# Patient Record
Sex: MALE | Race: WHITE | Employment: OTHER | URBAN - METROPOLITAN AREA
[De-identification: names, ages, dates, MRNs, and addresses within clinical notes are randomized per-mention and may not be internally consistent; named-entity substitution may affect disease eponyms.]

---

## 2017-01-04 ENCOUNTER — OFFICE VISIT (OUTPATIENT)
Dept: FAMILY MEDICINE | Facility: CLINIC | Age: 57
End: 2017-01-04
Payer: COMMERCIAL

## 2017-01-04 VITALS
RESPIRATION RATE: 22 BRPM | OXYGEN SATURATION: 98 % | DIASTOLIC BLOOD PRESSURE: 60 MMHG | WEIGHT: 208 LBS | TEMPERATURE: 96.6 F | BODY MASS INDEX: 29.09 KG/M2 | HEART RATE: 68 BPM | SYSTOLIC BLOOD PRESSURE: 100 MMHG

## 2017-01-04 DIAGNOSIS — G89.29 CHRONIC LEFT-SIDED LOW BACK PAIN WITHOUT SCIATICA: Primary | ICD-10-CM

## 2017-01-04 DIAGNOSIS — Z12.5 SCREENING FOR PROSTATE CANCER: ICD-10-CM

## 2017-01-04 DIAGNOSIS — M54.50 CHRONIC LEFT-SIDED LOW BACK PAIN WITHOUT SCIATICA: Primary | ICD-10-CM

## 2017-01-04 DIAGNOSIS — M54.2 CERVICALGIA: ICD-10-CM

## 2017-01-04 DIAGNOSIS — E78.5 HYPERLIPIDEMIA LDL GOAL <130: ICD-10-CM

## 2017-01-04 PROCEDURE — 99214 OFFICE O/P EST MOD 30 MIN: CPT | Performed by: FAMILY MEDICINE

## 2017-01-04 NOTE — NURSING NOTE
"Chief Complaint   Patient presents with     Back Pain     Back pain is still very bad and he is not getting any better.       Initial /60 mmHg  Pulse 68  Temp(Src) 96.6  F (35.9  C) (Temporal)  Resp 22  Wt 208 lb (94.348 kg)  SpO2 98% Estimated body mass index is 29.09 kg/(m^2) as calculated from the following:    Height as of 1/25/16: 5' 10.9\" (1.801 m).    Weight as of this encounter: 208 lb (94.348 kg).  BP completed using cuff size: regular    "
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
## 2017-01-04 NOTE — MR AVS SNAPSHOT
After Visit Summary   1/4/2017    Shashi Raza    MRN: [id number]           Patient Information     Date Of Birth          1960        Visit Information        Provider Department      1/4/2017 1:00 PM Yoni Kong MD [facility]        Today's Diagnoses     Chronic left-sided low back pain without sciatica    -  1     Hyperlipidemia LDL goal <130         Screening for prostate cancer         Cervicalgia            Follow-ups after your visit        Your next 10 appointments already scheduled     [place] 10, 2017  4:15 PM   MR LUMBAR SPINE W/O CONTRAST with PHMR1   [facility] ([facility])    [address] [address]   [phone]           Take your medicines as usual, unless your doctor tells you not to. Bring a list of your current medicines to your exam (including vitamins, minerals and over-the-counter drugs). Also bring the results of similar scans you may have had.  Please remove any body piercings and hair extensions before you arrive.  Follow your doctor s orders. If you do not, we may have to postpone your exam.  You will not have contrast for this exam. You do not need to do anything special to prepare.  The MRI machine uses a strong magnet. Please wear clothes without metal (snaps, zippers). A sweatsuit works well, or we may give you a hospital gown.   **IMPORTANT** THE INSTRUCTIONS BELOW ARE ONLY FOR THOSE PATIENTS WHO HAVE BEEN TOLD THEY WILL RECEIVE SEDATION OR GENERAL ANESTHESIA DURING THEIR MRI PROCEDURE:  IF YOU WILL RECEIVE SEDATION (take medicine to help you relax during your exam):   You must get the medicine from your doctor before you arrive. Bring the medicine to the exam. Do not take it at home.   Arrive one hour early. Bring someone who can take you home after the test. Your medicine will make you sleepy. After the exam, you may not drive, take a bus or take a taxi by yourself.   No eating 8 hours before  your exam. You may have clear liquids up until 4 hours before your exam. (Clear liquids include water, clear tea, black coffee and fruit juice without pulp.)  IF YOU WILL RECEIVE ANESTHESIA (be asleep for your exam):   Arrive 1 1/2 hours early. Bring someone who can take you home after the test. You may not drive, take a bus or take a taxi by yourself.   No eating 8 hours before your exam. You may have clear liquids up until 4 hours before your exam. (Clear liquids include water, clear tea, black coffee and fruit juice without pulp.)   You will spend four to five hours in the recovery room.  Please call the Imaging Department at your exam site with any questions.              Future tests that were ordered for you today     Open Future Orders        Priority Expected Expires Ordered    MR Lumbar Spine w/o Contrast Routine  1/4/2018 1/4/2017    Lipid Profile (Chol, Trig, HDL, LDL calc) Routine  1/4/2018 1/4/2017    PSA, screen Routine  1/4/2018 1/4/2017    Comprehensive metabolic panel (BMP + Alb, Alk Phos, ALT, AST, Total. Bili, TP) Routine  1/4/2018 1/4/2017            Who to contact     If you have questions or need follow up information about today's clinic visit or your schedule please contact [facility] directly at [phone].  Normal or non-critical lab and imaging results will be communicated to you by Purfreshhart, letter or phone within 4 business days after the clinic has received the results. If you do not hear from us within 7 days, please contact the clinic through Purfreshhart or phone. If you have a critical or abnormal lab result, we will notify you by phone as soon as possible.  Submit refill requests through test company or call your pharmacy and they will forward the refill request to us. Please allow 3 business days for your refill to be completed.          Additional Information About Your Visit        test company Information     test company lets you send messages to your doctor, view your test  "results, renew your prescriptions, schedule appointments and more. To sign up, go to [email or web address] . Click on \"Log in\" on the left side of the screen, which will take you to the Welcome page. Then click on \"Sign up Now\" on the right side of the page.     You will be asked to enter the access code listed below, as well as some personal information. Please follow the directions to create your username and password.     Your access code is: [email or web address]  Expires: 2017  7:56 AM     Your access code will  in 90 days. If you need help or a new code, please call your [place] clinic or [phone].        Care EveryWhere ID     This is your Care EveryWhere ID. This could be used by other organizations to access your [place] medical records  [id number]        Your Vitals Were     Pulse Temperature Respirations Pulse Oximetry          68 96.6  F (35.9  C) (Temporal) 22 98%         Blood Pressure from Last 3 Encounters:   17 100/60   16 120/70   10/26/16 118/78    Weight from Last 3 Encounters:   17 208 lb (94.348 kg)   10/26/16 203 lb (92.08 kg)   16 208 lb (94.348 kg)               Primary Care Provider Office Phone # Fax #    Yoni Kong [phone]       [facility] [address] [place] [address]        Thank you!     Thank you for choosing [facility]  for your care. Our goal is always to provide you with excellent care. Hearing back from our patients is one way we can continue to improve our services. Please take a few minutes to complete the written survey that you may receive in the mail after your visit with us. Thank you!             Your Updated Medication List - Protect others around you: Learn how to safely use, store and throw away your medicines at www.disposemymeds.org.          This list is accurate as of: 17  1:37 PM.  Always use your most recent med list.                   Brand Name Dispense " Instructions for use    atorvastatin 20 MG tablet    LIPITOR    90 tablet    Take 1 tablet (20 mg) by mouth daily

## 2017-01-10 ENCOUNTER — HOSPITAL ENCOUNTER (OUTPATIENT)
Dept: MRI IMAGING | Facility: CLINIC | Age: 57
Discharge: HOME | End: 2017-01-10
Attending: FAMILY MEDICINE | Admitting: FAMILY MEDICINE
Payer: COMMERCIAL

## 2017-01-10 DIAGNOSIS — M54.50 CHRONIC LEFT-SIDED LOW BACK PAIN WITHOUT SCIATICA: ICD-10-CM

## 2017-01-10 DIAGNOSIS — G89.29 CHRONIC LEFT-SIDED LOW BACK PAIN WITHOUT SCIATICA: ICD-10-CM

## 2017-01-10 PROCEDURE — 72148 MRI LUMBAR SPINE W/O DYE: CPT

## 2017-01-11 ENCOUNTER — TRANSFERRED RECORDS (OUTPATIENT)
Dept: HEALTH INFORMATION MANAGEMENT | Facility: CLINIC | Age: 57
End: 2017-01-11

## 2017-01-12 DIAGNOSIS — Z12.5 SCREENING FOR PROSTATE CANCER: ICD-10-CM

## 2017-01-12 DIAGNOSIS — E78.5 HYPERLIPIDEMIA LDL GOAL <130: ICD-10-CM

## 2017-01-12 LAB
ALBUMIN SERPL-MCNC: 4.1 G/DL (ref 3.4–5)
ALP SERPL-CCNC: 62 U/L (ref 40–150)
ALT SERPL W P-5'-P-CCNC: 34 U/L (ref 0–70)
ANION GAP SERPL CALCULATED.3IONS-SCNC: 6 MMOL/L (ref 3–14)
AST SERPL W P-5'-P-CCNC: 22 U/L (ref 0–45)
BILIRUB SERPL-MCNC: 0.6 MG/DL (ref 0.2–1.3)
BUN SERPL-MCNC: 17 MG/DL (ref 7–30)
CALCIUM SERPL-MCNC: 9 MG/DL (ref 8.5–10.1)
CHLORIDE SERPL-SCNC: 107 MMOL/L (ref 94–109)
CHOLEST SERPL-MCNC: 157 MG/DL
CO2 SERPL-SCNC: 29 MMOL/L (ref 20–32)
CREAT SERPL-MCNC: 0.99 MG/DL (ref 0.66–1.25)
GFR SERPL CREATININE-BSD FRML MDRD: 78 ML/MIN/1.7M2
GLUCOSE SERPL-MCNC: 111 MG/DL (ref 70–99)
HDLC SERPL-MCNC: 54 MG/DL
LDLC SERPL CALC-MCNC: 72 MG/DL
NONHDLC SERPL-MCNC: 103 MG/DL
POTASSIUM SERPL-SCNC: 4.1 MMOL/L (ref 3.4–5.3)
PROT SERPL-MCNC: 6.9 G/DL (ref 6.8–8.8)
PSA SERPL-ACNC: 1.31 UG/L (ref 0–4)
SODIUM SERPL-SCNC: 142 MMOL/L (ref 133–144)
TRIGL SERPL-MCNC: 156 MG/DL

## 2017-01-12 PROCEDURE — 36415 COLL VENOUS BLD VENIPUNCTURE: CPT | Performed by: FAMILY MEDICINE

## 2017-01-12 PROCEDURE — 80061 LIPID PANEL: CPT | Performed by: FAMILY MEDICINE

## 2017-01-12 PROCEDURE — G0103 PSA SCREENING: HCPCS | Performed by: FAMILY MEDICINE

## 2017-01-12 PROCEDURE — 80053 COMPREHEN METABOLIC PANEL: CPT | Performed by: FAMILY MEDICINE

## 2017-01-26 ENCOUNTER — OFFICE VISIT (OUTPATIENT)
Dept: FAMILY MEDICINE | Facility: CLINIC | Age: 57
End: 2017-01-26
Payer: COMMERCIAL

## 2017-01-26 VITALS
BODY MASS INDEX: 29.4 KG/M2 | HEIGHT: 71 IN | RESPIRATION RATE: 16 BRPM | DIASTOLIC BLOOD PRESSURE: 60 MMHG | TEMPERATURE: 97.1 F | WEIGHT: 210 LBS | OXYGEN SATURATION: 98 % | HEART RATE: 72 BPM | SYSTOLIC BLOOD PRESSURE: 104 MMHG

## 2017-01-26 DIAGNOSIS — S59.901A ELBOW INJURY, RIGHT, INITIAL ENCOUNTER: ICD-10-CM

## 2017-01-26 DIAGNOSIS — Z00.01 ENCOUNTER FOR GENERAL ADULT MEDICAL EXAMINATION WITH ABNORMAL FINDINGS: Primary | ICD-10-CM

## 2017-01-26 DIAGNOSIS — M54.50 BILATERAL LOW BACK PAIN WITHOUT SCIATICA, UNSPECIFIED CHRONICITY: ICD-10-CM

## 2017-01-26 DIAGNOSIS — E78.5 HYPERLIPIDEMIA LDL GOAL <130: ICD-10-CM

## 2017-01-26 PROCEDURE — 99213 OFFICE O/P EST LOW 20 MIN: CPT | Mod: 25 | Performed by: FAMILY MEDICINE

## 2017-01-26 PROCEDURE — 99396 PREV VISIT EST AGE 40-64: CPT | Performed by: FAMILY MEDICINE

## 2017-01-26 RX ORDER — ATORVASTATIN CALCIUM 20 MG/1
20 TABLET, FILM COATED ORAL DAILY
Qty: 90 TABLET | Refills: 3 | Status: SHIPPED | OUTPATIENT
Start: 2017-01-26 | End: 2018-02-21

## 2017-01-26 NOTE — NURSING NOTE
"Chief Complaint   Patient presents with     Physical       Initial /60 mmHg  Pulse 72  Temp(Src) 97.1  F (36.2  C) (Temporal)  Resp 16  Ht 5' 10.9\" (1.801 m)  Wt 210 lb (95.255 kg)  BMI 29.37 kg/m2  SpO2 98% Estimated body mass index is 29.37 kg/(m^2) as calculated from the following:    Height as of this encounter: 5' 10.9\" (1.801 m).    Weight as of this encounter: 210 lb (95.255 kg).  BP completed using cuff size: regular    "

## 2017-01-26 NOTE — MR AVS SNAPSHOT
After Visit Summary   1/26/2017    Shashi Raza    MRN: [id number]           Patient Information     Date Of Birth          1960        Visit Information        Provider Department      1/26/2017 2:00 PM Yoni Kong MD [facility]        Today's Diagnoses     Encounter for general adult medical examination with abnormal findings    -  1     Hyperlipidemia LDL goal <130         Bilateral low back pain without sciatica, unspecified chronicity         Elbow injury, right, initial encounter           Care Instructions      Preventive Health Recommendations  Male Ages 50 - 64    Yearly exam:             See your health care provider every year in order to  o   Review health changes.   o   Discuss preventive care.    o   Review your medicines if your doctor has prescribed any.     Have a cholesterol test every 5 years, or more frequently if you are at risk for high cholesterol/heart disease.     Have a diabetes test (fasting glucose) every three years. If you are at risk for diabetes, you should have this test more often.     Have a colonoscopy at age 50, or have a yearly FIT test (stool test). These exams will check for colon cancer.      Talk with your health care provider about whether or not a prostate cancer screening test (PSA) is right for you.    You should be tested each year for STDs (sexually transmitted diseases), if you re at risk.     Shots: Get a flu shot each year. Get a tetanus shot every 10 years.     Nutrition:    Eat at least 5 servings of fruits and vegetables daily.     Eat whole-grain bread, whole-wheat pasta and brown rice instead of white grains and rice.     Talk to your provider about Calcium and Vitamin D.     Lifestyle    Exercise for at least 150 minutes a week (30 minutes a day, 5 days a week). This will help you control your weight and prevent disease.     Limit alcohol to one drink per day.     No smoking.     Wear sunscreen to prevent skin  cancer.     See your dentist every six months for an exam and cleaning.     See your eye doctor every 1 to 2 years.            Follow-ups after your visit        Additional Services     [facility] PT, HAND, AND CHIROPRACTIC REFERRAL       **This order will print in the [facility] Scheduling Office**    Physical Therapy, Hand Therapy and Chiropractic Care are available through:    *[facility]  *[facility]  *[facility]    Call one number to schedule at any of the above locations: [phone].    Your provider has referred you to: [facility]    Indication/Reason for Referral: LBP  Onset of Illness:   Therapy Orders: Evaluate and Treat  Special Programs:   Special Request:     Lexi Elliott      Additional Comments for the Therapist or Chiropractor:     Please be aware that coverage of these services is subject to the terms and limitations of your health insurance plan.  Call member services at your health plan with any benefit or coverage questions.      Please bring the following to your appointment:    *Your personal calendar for scheduling future appointments  *Comfortable clothing            ORTHOPEDICS ADULT REFERRAL       Your provider has referred you to: [facility]: [facility] [phone]   [email or web address]    Please be aware that coverage of these services is subject to the terms and limitations of your health insurance plan.  Call member services at your health plan with any benefit or coverage questions.      Please bring the following to your appointment:    >>   Any x-rays, CTs or MRIs which have been performed.  Contact the facility where they were done to arrange for  prior to your scheduled appointment.    >>   List of current medications   >>   This referral request   >>   Any documents/labs given to you for this referral                  Who to contact     If you have questions or need follow up  "information about today's clinic visit or your schedule please contact [facility] directly at [phone].  Normal or non-critical lab and imaging results will be communicated to you by IDOS CORPhart, letter or phone within 4 business days after the clinic has received the results. If you do not hear from us within 7 days, please contact the clinic through IDOS CORPhart or phone. If you have a critical or abnormal lab result, we will notify you by phone as soon as possible.  Submit refill requests through Favim or call your pharmacy and they will forward the refill request to us. Please allow 3 business days for your refill to be completed.          Additional Information About Your Visit        MyChart Information     Favim lets you send messages to your doctor, view your test results, renew your prescriptions, schedule appointments and more. To sign up, go to [email or web address] . Click on \"Log in\" on the left side of the screen, which will take you to the Welcome page. Then click on \"Sign up Now\" on the right side of the page.     You will be asked to enter the access code listed below, as well as some personal information. Please follow the directions to create your username and password.     Your access code is: [email or web address]  Expires: 2017  2:52 PM     Your access code will  in 90 days. If you need help or a new code, please call your [place] clinic or [phone].        Care EveryWhere ID     This is your Care EveryWhere ID. This could be used by other organizations to access your [place] medical records  [id number]        Your Vitals Were     Pulse Temperature Respirations Height BMI (Body Mass Index) Pulse Oximetry    72 97.1  F (36.2  C) (Temporal) 16 5' 10.9\" (1.801 m) 29.37 kg/m2 98%       Blood Pressure from Last 3 Encounters:   17 104/60   17 100/60   16 120/70    Weight from Last 3 Encounters:   17 210 lb (95.255 kg)   17 208 lb (94.348 kg) "   10/26/16 203 lb (92.08 kg)              We Performed the Following     [facility] PT, HAND, AND CHIROPRACTIC REFERRAL     ORTHOPEDICS ADULT REFERRAL          Where to get your medicines      These medications were sent to [facility] [place] - [address]  [address] [place] [facility] [address]     Phone:  [phone]    - atorvastatin 20 MG tablet       Primary Care Provider Office Phone # Fax #    Yoni Kong [phone]       [place] [id number]   [place] [address]        Thank you!     Thank you for choosing [facility]  for your care. Our goal is always to provide you with excellent care. Hearing back from our patients is one way we can continue to improve our services. Please take a few minutes to complete the written survey that you may receive in the mail after your visit with us. Thank you!             Your Updated Medication List - Protect others around you: Learn how to safely use, store and throw away your medicines at www.disposemymeds.org.          This list is accurate as of: 1/26/17  2:53 PM.  Always use your most recent med list.                   Brand Name Dispense Instructions for use    atorvastatin 20 MG tablet    LIPITOR    90 tablet    Take 1 tablet (20 mg) by mouth daily

## 2017-02-01 ENCOUNTER — OFFICE VISIT (OUTPATIENT)
Dept: ORTHOPEDICS | Facility: CLINIC | Age: 57
End: 2017-02-01
Payer: COMMERCIAL

## 2017-02-01 ENCOUNTER — THERAPY VISIT (OUTPATIENT)
Dept: CHIROPRACTIC MEDICINE | Facility: CLINIC | Age: 57
End: 2017-02-01
Payer: COMMERCIAL

## 2017-02-01 VITALS — BODY MASS INDEX: 29.4 KG/M2 | HEIGHT: 71 IN | WEIGHT: 210 LBS | TEMPERATURE: 97 F

## 2017-02-01 DIAGNOSIS — M99.04 SEGMENTAL DYSFUNCTION OF SACRAL REGION: ICD-10-CM

## 2017-02-01 DIAGNOSIS — M70.21 OLECRANON BURSITIS OF RIGHT ELBOW: Primary | ICD-10-CM

## 2017-02-01 DIAGNOSIS — M48.062 SPINAL STENOSIS OF LUMBAR REGION WITH NEUROGENIC CLAUDICATION: ICD-10-CM

## 2017-02-01 DIAGNOSIS — G63 POLYNEUROPATHY IN OTHER DISEASES CLASSIFIED ELSEWHERE (H): Primary | ICD-10-CM

## 2017-02-01 DIAGNOSIS — M54.50 LUMBAGO: ICD-10-CM

## 2017-02-01 DIAGNOSIS — M99.03 SEGMENTAL DYSFUNCTION OF LUMBAR REGION: Primary | ICD-10-CM

## 2017-02-01 PROCEDURE — 99203 OFFICE O/P NEW LOW 30 MIN: CPT | Performed by: ORTHOPAEDIC SURGERY

## 2017-02-01 PROCEDURE — 98940 CHIROPRACT MANJ 1-2 REGIONS: CPT | Mod: AT | Performed by: CHIROPRACTOR

## 2017-02-01 PROCEDURE — 99203 OFFICE O/P NEW LOW 30 MIN: CPT | Mod: 25 | Performed by: CHIROPRACTOR

## 2017-02-01 RX ORDER — CELECOXIB 200 MG/1
CAPSULE ORAL
Qty: 30 CAPSULE | Refills: 11 | Status: SHIPPED | OUTPATIENT
Start: 2017-02-01 | End: 2018-02-21

## 2017-02-01 ASSESSMENT — PAIN SCALES - GENERAL: PAINLEVEL: MILD PAIN (2)
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
## 2017-02-01 NOTE — NURSING NOTE
"Chief Complaint   Patient presents with     Consult     rt elbow pain since a fall in October of 2016 per Dr. Kong       Initial Temp(Src) 97  F (36.1  C)  Ht 1.801 m (5' 10.9\")  Wt 95.255 kg (210 lb)  BMI 29.37 kg/m2 Estimated body mass index is 29.37 kg/(m^2) as calculated from the following:    Height as of this encounter: 1.801 m (5' 10.9\").    Weight as of this encounter: 95.255 kg (210 lb).  BP completed using cuff size: NA (Not Taken)    Yuli Randolph MA      "

## 2017-02-01 NOTE — PATIENT INSTRUCTIONS
Encounter Diagnosis   Name Primary?     Olecranon bursitis of right elbow Yes     Rest, ice and elevate above heart level as needed for pain control  1. We reviewed your xrays, they look good but you have a bone spur that may be irritating the bursa  2. The bursa is like a deflated water balloon that protects the elbow, but it can get inflamed.  3. If it was swelled up we could take fluid out with a needle and put in some cortisone.  4. Without doing injections, you could do compression (like an ace wrap) or a sleeve.    5. Also NSAIDs like your Celebrex can help.  Ibuprofen is also good.  6. We gave you an ace wrap today.  7. Call us if you ever need the elbow drained, we can work you in.    8. Surgery is rare for this.  9. Follow up with Taryn Bruce MD and Carter Marcum PA-C on an as needed basis.   [employer] and [employer] may offer reliable information regarding your diagnosis and treatment plan.    THANK YOU for coming in today. If you receive a survey via [employer] or mail please let us know if there was anything you especially appreciated today or if there is any way we can improve our clinic. We appreciate your input.    GENERAL INFORMATION:  Our hours are:  Monday :     Clinic 7:30 AM-430 PM ([facility])  Tuesday:      Operating Room All Day ([facility])  Wednesday: Clinic 7:30 AM - 11:15 AM ([facility])             Clinic 1:00 PM - 4:00PM ([facility])  Thursday:     Administrative Day  Friday:          Clinic 7:30 AM - 11:15 AM ([facility])            Clinic 1:00 PM - 4:00 PM ([facility])    We are not in the office Thursdays. Therefore non- urgent calls and medical messages received on Thursday will be addressed when we are back in the office on Wednesday. Urgent matters will be reviewed and addressed by one of our partners in the office as needed.    If lab work was done  today as part of your evaluation you will generally be contacted via [employer], mail, or phone with the results within 1-5 days. If there is an alarming result we will contact you by phone. Lab results come back at varying times, I generally wait until all labs are resulted before making comments on results. Please note labs are automatically released to [employer] (if you have signed up for it) once available-at times you may see these prior to my having a chance to review them as well.    If you need refills please contact your pharmacist. They will send a refill request to me to review. Please allow 3 business days for us to process all refill requests. All narcotic refills should be handled in the clinic at the time of your visit.   Bursitis, Elbow (Olecranon)  Your elbow joint contains a small fluid-filled sac called a bursa. The bursa helps the muscles and tendons move smoothly over the bone. It also cushions and protects your elbow. Bursitis is when the bursa is inflamed or swollen. This is most often due to overuse of or injury to the elbow. Symptoms include swelling and pain. If the elbow is red and feels warm to the touch, the bursa itself may be infected.  In most cases, elbow bursitis resolves with medications and self-care at home. It may take 2 to 3 weeks for the bursa to heal and the swelling to go away. In some cases, excess fluid is drained from the bursa. Medication can also be injected directly into the bursa to help relieve symptoms. In severe cases, surgery to remove the bursa may be needed. If there is concern that the bursa is infected, antibiotics may be prescribed to treat the infection.    Home care  Medications may be prescribed to help relieve pain and swelling. This may be an over-the-counter pain reliever or prescription pain medication. Take all medications as directed. To help treat or prevent infection, antibiotics may be given. If these are prescribed, take them as directed until they  are gone.  The following are general care guidelines:    Apply an ice pack or bag of frozen peas wrapped in a thin towel to your elbow for 15 to 20 minutes at a time. Do this 3 to 4 times a day until pain and swelling improve.    Keep your elbow raised above the level of your heart whenever possible. This helps reduce swelling. When sitting or lying down, place your arm on a pillow that rests on your chest or on a pillow at your side.    Use an elastic wrap around the elbow joint to compress the area while it is healing. Make the wrap snug but not tight to the point of causing pain.    Rest your elbow to give it time to heal. You may need to wear an elbow pad to help protect and limit the movement of your elbow. During and after healing, avoid leaning on your elbows.  Follow-up care  Follow up as advised by the doctor or our staff. If you have been referred to a specialist, make that appointment promptly.  When to seek medical care  Get prompt medical attention if any of the following occurs:    Fever of 100.4 F (38 C) or higher    Increased pain, swelling, warmth, redness, or drainage from the joint    Trouble moving the elbow joint    Numbness or tingling in the hand    Severe pain or swelling in forearm or hand    Loss of pink color and slow return of color after squeezing fingertip or hand    1896-0128 [employer]. [address] [place] [address], [place] [address]. All rights reserved. This information is not intended as a substitute for professional medical care. Always follow your healthcare professional's instructions.

## 2017-02-01 NOTE — PROGRESS NOTES
Shashi Raza is a 56 year old male who is seen in consultation at the request of Dr. Yoni Kong MD.    History of Present illness:    Shashi presents for evaluation of:    1.) rt elbow pain  2.)     Onset:  October 2015    Symptoms brought on by pressure.     Location:  rt elbow.      Character:  sharp.      Progression of symptoms:  same.      Previous similar pain: no .     Pain Level:  2/10.     Previous treatments:  nothing.      Currently on Blood thinners? no    Diagnosis of Diabetes? no

## 2017-02-01 NOTE — TELEPHONE ENCOUNTER
Reason for Call:  Medication or medication refill:    Do you use a [facility]?  Name of the pharmacy and phone number for the current request:  [facility] - [phone]    Name of the medication requested: celebrex    Other request: no    Can we leave a detailed message on this number? YES    Phone number patient can be reached at: Home number on file [phone] (home)    Best Time: any    Call taken on 2/1/2017 at 2:10 PM by Capri Martinez

## 2017-02-08 ENCOUNTER — THERAPY VISIT (OUTPATIENT)
Dept: CHIROPRACTIC MEDICINE | Facility: CLINIC | Age: 57
End: 2017-02-08
Payer: COMMERCIAL

## 2017-02-08 DIAGNOSIS — M54.50 LUMBAGO: ICD-10-CM

## 2017-02-08 DIAGNOSIS — M99.04 SEGMENTAL DYSFUNCTION OF SACRAL REGION: ICD-10-CM

## 2017-02-08 DIAGNOSIS — M48.062 SPINAL STENOSIS OF LUMBAR REGION WITH NEUROGENIC CLAUDICATION: ICD-10-CM

## 2017-02-08 DIAGNOSIS — M99.03 SEGMENTAL DYSFUNCTION OF LUMBAR REGION: Primary | ICD-10-CM

## 2017-02-08 PROCEDURE — 98940 CHIROPRACT MANJ 1-2 REGIONS: CPT | Mod: AT | Performed by: CHIROPRACTOR

## 2017-02-08 NOTE — PROGRESS NOTES
Visit #:  2 of 8 based on treatment plan 2/1/2017    Subjective:  Shashi Raza is a 56 year old male who is seen in f/u up for:        Segmental dysfunction of lumbar region  Spinal stenosis of lumbar region with neurogenic claudication  Lumbago  Segmental dysfunction of sacral region.     Since last visit on 2/1/2017,  Shashi Raza reports the following changes: Patient presents and states that he felt nothing form his first adjustment. Her left lower back feels like he has a bruise. He notes that his arms and legs are always numb. His back is still hurting him, rated 2-3/10 depending on how he moves. He took 1 Celebrex since he was last here. He tries not to take it unless he has to. He has been wearing a [employer] back brace for 2-3 hours, and it has made his back more sore.          Objective:  The following was observed:    P: pain elicited on palpation, bilateral lower lumbar musculature    A: static palpation demonstrates intersegmental asymmetry, as noted    R: motion palpation notes restricted motion    T: localized muscle spasm at: Gluteal, Lumbar erector spine, Piriformis and Quad lumb Bilaterally      Assessment:    Segmental spinal dysfunction/restrictions found at:  L5  PSIS Right    Diagnoses:      1. Segmental dysfunction of lumbar region    2. Spinal stenosis of lumbar region with neurogenic claudication    3. Lumbago    4. Segmental dysfunction of sacral region        Patient's condition:  Patient had restrictions pre-manipulation and Patient had decreased motion prior to manipulation    Treatment effectiveness:  Post manipulation there is better intersegmental movement and Patient claims to feel looser post manipulation      Procedures:  CMT:  24696 Chiropractic manipulative treatment 1-2 regions performed   Lumbar: Drop Table, L5, Prone  Pelvis: Drop Table, PSIS Right , Prone    Modalities:  90933: MSTM:  To Gluteal, Lumbar erector spine and Piriformis  for 5 min    Therapeutic  procedures:  None      Prognosis: Good    Progress towards Goals: Patient has not made any progress with 1 visit, and states that he will call if he feels he needs further care.      Response to Treatment:   No Change in symptoms      Recommendations:    Instructions:ice 20 minutes every other hour as needed, stretch as instructed at visit and walk 10 minutes    Follow-up:  Patient chooses to call in the future if he needs further care, but it is clear that he doesn't feel this will benefit his care.

## 2017-05-02 ENCOUNTER — TRANSFERRED RECORDS (OUTPATIENT)
Dept: HEALTH INFORMATION MANAGEMENT | Facility: CLINIC | Age: 57
End: 2017-05-02

## 2017-05-10 ENCOUNTER — OFFICE VISIT (OUTPATIENT)
Dept: FAMILY MEDICINE | Facility: CLINIC | Age: 57
End: 2017-05-10
Payer: COMMERCIAL

## 2017-05-10 VITALS
HEART RATE: 80 BPM | DIASTOLIC BLOOD PRESSURE: 76 MMHG | SYSTOLIC BLOOD PRESSURE: 124 MMHG | RESPIRATION RATE: 20 BRPM | TEMPERATURE: 97.4 F | BODY MASS INDEX: 29.09 KG/M2 | WEIGHT: 208 LBS
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
## 2017-05-10 PROCEDURE — 99214 OFFICE O/P EST MOD 30 MIN: CPT | Performed by: FAMILY MEDICINE

## 2017-05-10 RX ORDER — METHOCARBAMOL 500 MG/1
500 TABLET, FILM COATED ORAL 2 TIMES DAILY
COMMUNITY
End: 2018-02-21

## 2017-05-10 ASSESSMENT — PAIN SCALES - GENERAL: PAINLEVEL: MODERATE PAIN (4)

## 2017-05-10 NOTE — MR AVS SNAPSHOT
"              After Visit Summary   5/10/2017    Shashi Raza    MRN: [id number]           Patient Information     Date Of Birth          1960        Visit Information        Provider Department      5/10/2017 2:20 PM Yoni Kong MD [facility]         Follow-ups after your visit        Who to contact     If you have questions or need follow up information about today's clinic visit or your schedule please contact [facility] directly at [phone].  Normal or non-critical lab and imaging results will be communicated to you by Social[employer]abeteshart, letter or phone within 4 business days after the clinic has received the results. If you do not hear from us within 7 days, please contact the clinic through [employer]abetes[employer] or phone. If you have a critical or abnormal lab result, we will notify you by phone as soon as possible.  Submit refill requests through [employer] or call your pharmacy and they will forward the refill request to us. Please allow 3 business days for your refill to be completed.          Additional Information About Your Visit        SocialDiabetesharShoto Information     [employer] lets you send messages to your doctor, view your test results, renew your prescriptions, schedule appointments and more. To sign up, go to [email or web address] . Click on \"Log in\" on the left side of the screen, which will take you to the Welcome page. Then click on \"Sign up Now\" on the right side of the page.     You will be asked to enter the access code listed below, as well as some personal information. Please follow the directions to create your username and password.     Your access code is: [email or web address]  Expires: 2017  4:36 PM     Your access code will  in 90 days. If you need help or a new code, please call your [facility] or [phone].        Care EveryWhere ID     This is your Care EveryWhere ID. This could be used by other organizations to access your [facility] medical " records  [id number]        Your Vitals Were     Pulse Temperature Respirations BMI (Body Mass Index)          80 97.4  F (36.3  C) (Temporal) 20 29.09 kg/m2         Blood Pressure from Last 3 Encounters:   05/10/17 124/76   01/26/17 104/60   01/04/17 100/60    Weight from Last 3 Encounters:   05/10/17 208 lb (94.3 kg)   02/01/17 210 lb (95.3 kg)   01/26/17 210 lb (95.3 kg)              Today, you had the following     No orders found for display       Primary Care Provider Office Phone # Fax #    Yoni Kong [phone]       [facility] [address] [place] [address]        Thank you!     Thank you for choosing [facility]  for your care. Our goal is always to provide you with excellent care. Hearing back from our patients is one way we can continue to improve our services. Please take a few minutes to complete the written survey that you may receive in the mail after your visit with us. Thank you!             Your Updated Medication List - Protect others around you: Learn how to safely use, store and throw away your medicines at www.disposemymeds.org.          This list is accurate as of: 5/10/17  4:36 PM.  Always use your most recent med list.                   Brand Name Dispense Instructions for use    atorvastatin 20 MG tablet    LIPITOR    90 tablet    Take 1 tablet (20 mg) by mouth daily       celecoxib 200 MG capsule    celeBREX    30 capsule    1 Capsule daily       IBU-200 PO      Take 4 tablets by mouth 2 times daily       OXYCODONE HCL PO          ROBAXIN 500 MG tablet   Generic drug:  methocarbamol      Take 500 mg by mouth 2 times daily
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
## 2017-05-24 ENCOUNTER — OFFICE VISIT (OUTPATIENT)
Dept: FAMILY MEDICINE | Facility: CLINIC | Age: 57
End: 2017-05-24
Payer: COMMERCIAL

## 2017-05-24 VITALS
DIASTOLIC BLOOD PRESSURE: 60 MMHG | SYSTOLIC BLOOD PRESSURE: 110 MMHG | RESPIRATION RATE: 22 BRPM | OXYGEN SATURATION: 100 % | HEART RATE: 74 BPM | TEMPERATURE: 97.1 F
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
## 2017-05-24 DIAGNOSIS — G44.219 EPISODIC TENSION-TYPE HEADACHE, NOT INTRACTABLE: ICD-10-CM

## 2017-05-24 DIAGNOSIS — M54.2 CERVICALGIA: ICD-10-CM

## 2017-05-24 PROCEDURE — 99214 OFFICE O/P EST MOD 30 MIN: CPT | Performed by: FAMILY MEDICINE
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
## 2017-05-24 NOTE — MR AVS SNAPSHOT
"              After Visit Summary   2017    Shashi Raza    MRN: [id number]           Patient Information     Date Of Birth          1960        Visit Information        Provider Department      2017 8:20 AM Yoni Kong MD [facility]         Follow-ups after your visit        Who to contact     If you have questions or need follow up information about today's clinic visit or your schedule please contact [facility] directly at [phone].  Normal or non-critical lab and imaging results will be communicated to you by MyChart, letter or phone within 4 business days after the clinic has received the results. If you do not hear from us within 7 days, please contact the clinic through [employer] or phone. If you have a critical or abnormal lab result, we will notify you by phone as soon as possible.  Submit refill requests through [employer] or call your pharmacy and they will forward the refill request to us. Please allow 3 business days for your refill to be completed.          Additional Information About Your Visit        [employer] Information     [employer] lets you send messages to your doctor, view your test results, renew your prescriptions, schedule appointments and more. To sign up, go to [email or web address] . Click on \"Log in\" on the left side of the screen, which will take you to the Welcome page. Then click on \"Sign up Now\" on the right side of the page.     You will be asked to enter the access code listed below, as well as some personal information. Please follow the directions to create your username and password.     Your access code is: [email or web address]  Expires: 2017  4:36 PM     Your access code will  in 90 days. If you need help or a new code, please call your [facility] or [phone].        Care EveryWhere ID     This is your Care EveryWhere ID. This could be used by other organizations to access your [place] medical " records  [id number]        Your Vitals Were     Pulse Temperature Respirations Pulse Oximetry          74 97.1  F (36.2  C) (Temporal) 22 100%         Blood Pressure from Last 3 Encounters:   05/24/17 110/60   05/10/17 124/76   01/26/17 104/60    Weight from Last 3 Encounters:   05/10/17 208 lb (94.3 kg)   02/01/17 210 lb (95.3 kg)   01/26/17 210 lb (95.3 kg)              Today, you had the following     No orders found for display       Primary Care Provider Office Phone # Fax #    Yoni Kong [phone]       [facility] [address] [place] [address]        Thank you!     Thank you for choosing [facility]  for your care. Our goal is always to provide you with excellent care. Hearing back from our patients is one way we can continue to improve our services. Please take a few minutes to complete the written survey that you may receive in the mail after your visit with us. Thank you!             Your Updated Medication List - Protect others around you: Learn how to safely use, store and throw away your medicines at www.disposemymeds.org.          This list is accurate as of: 5/24/17  9:54 AM.  Always use your most recent med list.                   Brand Name Dispense Instructions for use    atorvastatin 20 MG tablet    LIPITOR    90 tablet    Take 1 tablet (20 mg) by mouth daily       celecoxib 200 MG capsule    celeBREX    30 capsule    1 Capsule daily       IBU-200 PO      Take 4 tablets by mouth 2 times daily       ROBAXIN 500 MG tablet   Generic drug:  methocarbamol      Take 500 mg by mouth 2 times daily

## 2017-06-07 ENCOUNTER — OFFICE VISIT (OUTPATIENT)
Dept: FAMILY MEDICINE | Facility: CLINIC | Age: 57
End: 2017-06-07
Payer: COMMERCIAL

## 2017-06-07 VITALS
OXYGEN SATURATION: 97 % | DIASTOLIC BLOOD PRESSURE: 68 MMHG | BODY MASS INDEX: 28.95 KG/M2 | HEART RATE: 78 BPM | SYSTOLIC BLOOD PRESSURE: 110 MMHG | WEIGHT: 207 LBS | TEMPERATURE: 96.6 F | RESPIRATION RATE: 24 BRPM

## 2017-06-07 DIAGNOSIS — M54.2 CERVICALGIA: ICD-10-CM

## 2017-06-07 DIAGNOSIS — G44.311 INTRACTABLE ACUTE POST-TRAUMATIC HEADACHE: Primary | ICD-10-CM
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
## 2017-06-07 PROCEDURE — 99214 OFFICE O/P EST MOD 30 MIN: CPT | Performed by: FAMILY MEDICINE
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
## 2017-06-12 ENCOUNTER — HOSPITAL ENCOUNTER (OUTPATIENT)
Dept: MRI IMAGING | Facility: CLINIC | Age: 57
Discharge: HOME | End: 2017-06-12
Attending: FAMILY MEDICINE | Admitting: FAMILY MEDICINE
Payer: COMMERCIAL

## 2017-06-12 DIAGNOSIS — G44.311 INTRACTABLE ACUTE POST-TRAUMATIC HEADACHE: ICD-10-CM
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
## 2017-06-12 PROCEDURE — 70553 MRI BRAIN STEM W/O & W/DYE: CPT

## 2017-06-12 PROCEDURE — 25000128 H RX IP 250 OP 636: Performed by: RADIOLOGY

## 2017-06-12 PROCEDURE — A9585 GADOBUTROL INJECTION: HCPCS | Performed by: RADIOLOGY

## 2017-06-12 RX ORDER — GADOBUTROL 604.72 MG/ML
10 INJECTION INTRAVENOUS ONCE
Status: COMPLETED | OUTPATIENT
Start: 2017-06-12 | End: 2017-06-12

## 2017-06-12 RX ADMIN — GADOBUTROL 10 ML: 604.72 INJECTION INTRAVENOUS at 13:45

## 2017-06-12 NOTE — LETTER
[facility] MRI  [address] [address]  Phone: [phone]          June 14, 2017    Shashi Raza  [address] [place] [address]          Dear Shashi,      MRI RESULTS:     The results of your recent MRI were NORMAL.  If you have any further questions or problems, please contact our office.          Sincerely,      Yoni Kong M.D.

## 2017-06-13 ENCOUNTER — HOSPITAL ENCOUNTER (OUTPATIENT)
Dept: PHYSICAL THERAPY | Facility: CLINIC | Age: 57
Setting detail: THERAPIES SERIES
End: 2017-06-13
Attending: FAMILY MEDICINE
Payer: COMMERCIAL

## 2017-06-13 PROCEDURE — 97530 THERAPEUTIC ACTIVITIES: CPT | Mod: GP | Performed by: PHYSICAL THERAPIST

## 2017-06-13 PROCEDURE — 40000718 ZZHC STATISTIC PT DEPARTMENT ORTHO VISIT: Performed by: PHYSICAL THERAPIST

## 2017-06-13 PROCEDURE — 97162 PT EVAL MOD COMPLEX 30 MIN: CPT | Mod: GP | Performed by: PHYSICAL THERAPIST
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
## 2017-06-20 ENCOUNTER — HOSPITAL ENCOUNTER (OUTPATIENT)
Dept: PHYSICAL THERAPY | Facility: CLINIC | Age: 57
Setting detail: THERAPIES SERIES
End: 2017-06-20
Attending: FAMILY MEDICINE
Payer: COMMERCIAL

## 2017-06-20 PROCEDURE — 40000767 ZZHC STATISTIC PT CONCUSSION VISIT: Performed by: PHYSICAL THERAPIST

## 2017-06-20 PROCEDURE — 97140 MANUAL THERAPY 1/> REGIONS: CPT | Mod: GP | Performed by: PHYSICAL THERAPIST

## 2017-06-20 PROCEDURE — 97110 THERAPEUTIC EXERCISES: CPT | Mod: GP | Performed by: PHYSICAL THERAPIST

## 2017-06-20 PROCEDURE — 97530 THERAPEUTIC ACTIVITIES: CPT | Mod: GP | Performed by: PHYSICAL THERAPIST

## 2017-06-20 PROCEDURE — 97112 NEUROMUSCULAR REEDUCATION: CPT | Mod: GP | Performed by: PHYSICAL THERAPIST

## 2017-06-21 ENCOUNTER — OFFICE VISIT (OUTPATIENT)
Dept: FAMILY MEDICINE | Facility: CLINIC | Age: 57
End: 2017-06-21
Payer: COMMERCIAL

## 2017-06-21 VITALS
OXYGEN SATURATION: 99 % | WEIGHT: 208 LBS | DIASTOLIC BLOOD PRESSURE: 80 MMHG | HEART RATE: 74 BPM | TEMPERATURE: 96.9 F | BODY MASS INDEX: 29.09 KG/M2 | RESPIRATION RATE: 22 BRPM | SYSTOLIC BLOOD PRESSURE: 120 MMHG

## 2017-06-21 DIAGNOSIS — G44.219 EPISODIC TENSION-TYPE HEADACHE, NOT INTRACTABLE: Primary | ICD-10-CM

## 2017-06-21 PROCEDURE — 99213 OFFICE O/P EST LOW 20 MIN: CPT | Performed by: FAMILY MEDICINE

## 2017-06-21 RX ORDER — LIDOCAINE 50 MG/G
PATCH TOPICAL
Qty: 30 PATCH | Refills: 0 | Status: SHIPPED | OUTPATIENT
Start: 2017-06-21 | End: 2021-06-07
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
## 2017-06-27 ENCOUNTER — HOSPITAL ENCOUNTER (OUTPATIENT)
Dept: PHYSICAL THERAPY | Facility: CLINIC | Age: 57
Setting detail: THERAPIES SERIES
End: 2017-06-27
Attending: FAMILY MEDICINE
Payer: COMMERCIAL

## 2017-06-27 PROCEDURE — 97140 MANUAL THERAPY 1/> REGIONS: CPT | Mod: GP | Performed by: PHYSICAL THERAPIST

## 2017-06-27 PROCEDURE — 40000767 ZZHC STATISTIC PT CONCUSSION VISIT: Performed by: PHYSICAL THERAPIST

## 2017-06-27 PROCEDURE — 97110 THERAPEUTIC EXERCISES: CPT | Mod: GP | Performed by: PHYSICAL THERAPIST

## 2017-06-27 PROCEDURE — 97112 NEUROMUSCULAR REEDUCATION: CPT | Mod: GP | Performed by: PHYSICAL THERAPIST

## 2017-07-05 ENCOUNTER — HOSPITAL ENCOUNTER (OUTPATIENT)
Dept: PHYSICAL THERAPY | Facility: CLINIC | Age: 57
Setting detail: THERAPIES SERIES
End: 2017-07-05
Attending: FAMILY MEDICINE
Payer: COMMERCIAL

## 2017-07-05 PROCEDURE — 97110 THERAPEUTIC EXERCISES: CPT | Mod: GP | Performed by: PHYSICAL THERAPIST

## 2017-07-05 PROCEDURE — 40000718 ZZHC STATISTIC PT DEPARTMENT ORTHO VISIT: Performed by: PHYSICAL THERAPIST

## 2017-07-05 PROCEDURE — 97140 MANUAL THERAPY 1/> REGIONS: CPT | Mod: GP | Performed by: PHYSICAL THERAPIST

## 2017-07-12 ENCOUNTER — HOSPITAL ENCOUNTER (OUTPATIENT)
Dept: PHYSICAL THERAPY | Facility: CLINIC | Age: 57
Setting detail: THERAPIES SERIES
End: 2017-07-12
Attending: FAMILY MEDICINE
Payer: COMMERCIAL

## 2017-07-12 PROCEDURE — 40000718 ZZHC STATISTIC PT DEPARTMENT ORTHO VISIT: Performed by: PHYSICAL THERAPIST

## 2017-07-12 PROCEDURE — 97530 THERAPEUTIC ACTIVITIES: CPT | Mod: GP | Performed by: PHYSICAL THERAPIST

## 2017-07-12 PROCEDURE — 97140 MANUAL THERAPY 1/> REGIONS: CPT | Mod: GP | Performed by: PHYSICAL THERAPIST

## 2017-07-18 ENCOUNTER — HOSPITAL ENCOUNTER (OUTPATIENT)
Dept: PHYSICAL THERAPY | Facility: CLINIC | Age: 57
Setting detail: THERAPIES SERIES
End: 2017-07-18
Attending: DENTIST
Payer: COMMERCIAL

## 2017-07-18 PROCEDURE — 97113 AQUATIC THERAPY/EXERCISES: CPT | Mod: GP | Performed by: PHYSICAL THERAPIST

## 2017-07-18 PROCEDURE — 40000189 ZZH STATISTIC PT POOL VISIT: Performed by: PHYSICAL THERAPIST

## 2017-07-25 ENCOUNTER — HOSPITAL ENCOUNTER (OUTPATIENT)
Dept: PHYSICAL THERAPY | Facility: CLINIC | Age: 57
Setting detail: THERAPIES SERIES
End: 2017-07-25
Attending: FAMILY MEDICINE
Payer: COMMERCIAL

## 2017-07-25 PROCEDURE — 97110 THERAPEUTIC EXERCISES: CPT | Mod: GP | Performed by: PHYSICAL THERAPIST

## 2017-07-25 PROCEDURE — 97140 MANUAL THERAPY 1/> REGIONS: CPT | Mod: GP | Performed by: PHYSICAL THERAPIST

## 2017-07-25 PROCEDURE — 40000718 ZZHC STATISTIC PT DEPARTMENT ORTHO VISIT: Performed by: PHYSICAL THERAPIST

## 2017-08-01 ENCOUNTER — HOSPITAL ENCOUNTER (OUTPATIENT)
Dept: PHYSICAL THERAPY | Facility: CLINIC | Age: 57
Setting detail: THERAPIES SERIES
End: 2017-08-01
Attending: FAMILY MEDICINE
Payer: COMMERCIAL

## 2017-08-01 PROCEDURE — 97140 MANUAL THERAPY 1/> REGIONS: CPT | Mod: GP | Performed by: PHYSICAL THERAPIST

## 2017-08-01 PROCEDURE — 40000718 ZZHC STATISTIC PT DEPARTMENT ORTHO VISIT: Performed by: PHYSICAL THERAPIST

## 2017-08-23 ENCOUNTER — HOSPITAL ENCOUNTER (OUTPATIENT)
Dept: PHYSICAL THERAPY | Facility: OTHER | Age: 57
Setting detail: THERAPIES SERIES
End: 2017-08-23
Payer: COMMERCIAL

## 2017-08-23 PROCEDURE — 97140 MANUAL THERAPY 1/> REGIONS: CPT | Mod: GP | Performed by: PHYSICAL THERAPIST

## 2017-08-23 PROCEDURE — 40000718 ZZHC STATISTIC PT DEPARTMENT ORTHO VISIT: Performed by: PHYSICAL THERAPIST

## 2017-08-30 ENCOUNTER — HOSPITAL ENCOUNTER (OUTPATIENT)
Dept: PHYSICAL THERAPY | Facility: OTHER | Age: 57
Setting detail: THERAPIES SERIES
End: 2017-08-30
Payer: COMMERCIAL

## 2017-08-30 PROCEDURE — 97140 MANUAL THERAPY 1/> REGIONS: CPT | Mod: GP | Performed by: PHYSICAL THERAPIST

## 2017-08-30 PROCEDURE — 40000718 ZZHC STATISTIC PT DEPARTMENT ORTHO VISIT: Performed by: PHYSICAL THERAPIST

## 2017-09-06 ENCOUNTER — HOSPITAL ENCOUNTER (OUTPATIENT)
Dept: PHYSICAL THERAPY | Facility: OTHER | Age: 57
Setting detail: THERAPIES SERIES
End: 2017-09-06
Payer: COMMERCIAL

## 2017-09-06 PROCEDURE — 97140 MANUAL THERAPY 1/> REGIONS: CPT | Mod: GP | Performed by: PHYSICAL THERAPIST

## 2017-09-06 PROCEDURE — 40000718 ZZHC STATISTIC PT DEPARTMENT ORTHO VISIT: Performed by: PHYSICAL THERAPIST

## 2017-09-11 ENCOUNTER — HOSPITAL ENCOUNTER (OUTPATIENT)
Dept: PHYSICAL THERAPY | Facility: OTHER | Age: 57
Setting detail: THERAPIES SERIES
End: 2017-09-11
Attending: FAMILY MEDICINE
Payer: COMMERCIAL

## 2017-09-11 PROCEDURE — 97140 MANUAL THERAPY 1/> REGIONS: CPT | Mod: GP | Performed by: PHYSICAL THERAPIST

## 2017-09-11 PROCEDURE — 40000718 ZZHC STATISTIC PT DEPARTMENT ORTHO VISIT: Performed by: PHYSICAL THERAPIST

## 2017-09-18 ENCOUNTER — HOSPITAL ENCOUNTER (OUTPATIENT)
Dept: PHYSICAL THERAPY | Facility: OTHER | Age: 57
Setting detail: THERAPIES SERIES
End: 2017-09-18
Attending: FAMILY MEDICINE
Payer: COMMERCIAL

## 2017-09-18 PROCEDURE — 97140 MANUAL THERAPY 1/> REGIONS: CPT | Mod: GP | Performed by: PHYSICAL THERAPIST

## 2017-09-18 PROCEDURE — 40000718 ZZHC STATISTIC PT DEPARTMENT ORTHO VISIT: Performed by: PHYSICAL THERAPIST

## 2017-09-26 ENCOUNTER — HOSPITAL ENCOUNTER (OUTPATIENT)
Dept: PHYSICAL THERAPY | Facility: CLINIC | Age: 57
Setting detail: THERAPIES SERIES
End: 2017-09-26
Attending: FAMILY MEDICINE
Payer: COMMERCIAL

## 2017-09-26 PROCEDURE — 40000718 ZZHC STATISTIC PT DEPARTMENT ORTHO VISIT: Performed by: PHYSICAL THERAPIST

## 2017-09-26 PROCEDURE — 97140 MANUAL THERAPY 1/> REGIONS: CPT | Mod: GP | Performed by: PHYSICAL THERAPIST
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
## 2018-01-11 ENCOUNTER — TELEPHONE (OUTPATIENT)
Dept: FAMILY MEDICINE | Facility: CLINIC | Age: 58
End: 2018-01-11

## 2018-01-11 DIAGNOSIS — Z00.00 ENCOUNTER FOR ROUTINE ADULT HEALTH EXAMINATION: ICD-10-CM

## 2018-01-11 DIAGNOSIS — E78.5 HYPERLIPIDEMIA LDL GOAL <130: Primary | ICD-10-CM

## 2018-01-11 NOTE — TELEPHONE ENCOUNTER
Per Dr. Kong: Comp, Lipid and PSA. Ordered for future. Patient will be do for these after 01/12/18. Patient was called and notified. He will be going to [place] for a couple months and then hell be scheduling an appointment.

## 2018-01-11 NOTE — TELEPHONE ENCOUNTER
Reason for Call: Request for an order or referral:    Order or referral being requested: for labs to be done before physical    Date needed: as soon as possible    Has the patient been seen by the PCP for this problem? YES    Additional comments: patient states he always does this before his physical, please let patient know when this is done    Phone number Patient can be reached at:  Home number on file [phone] (home)    Best Time:  Any     Can we leave a detailed message on this number?  YES    Call taken on 1/11/2018 at 2:30 PM by Nancie Alonso

## 2018-02-14 DIAGNOSIS — Z00.00 ENCOUNTER FOR ROUTINE ADULT HEALTH EXAMINATION: ICD-10-CM

## 2018-02-14 DIAGNOSIS — E78.5 HYPERLIPIDEMIA LDL GOAL <130: ICD-10-CM

## 2018-02-14 LAB
ALBUMIN SERPL-MCNC: 4.1 G/DL (ref 3.4–5)
ALP SERPL-CCNC: 53 U/L (ref 40–150)
ALT SERPL W P-5'-P-CCNC: 32 U/L (ref 0–70)
ANION GAP SERPL CALCULATED.3IONS-SCNC: 8 MMOL/L (ref 3–14)
AST SERPL W P-5'-P-CCNC: 20 U/L (ref 0–45)
BILIRUB SERPL-MCNC: 0.8 MG/DL (ref 0.2–1.3)
BUN SERPL-MCNC: 14 MG/DL (ref 7–30)
CALCIUM SERPL-MCNC: 8.8 MG/DL (ref 8.5–10.1)
CHLORIDE SERPL-SCNC: 106 MMOL/L (ref 94–109)
CHOLEST SERPL-MCNC: 135 MG/DL
CO2 SERPL-SCNC: 28 MMOL/L (ref 20–32)
CREAT SERPL-MCNC: 1.04 MG/DL (ref 0.66–1.25)
GFR SERPL CREATININE-BSD FRML MDRD: 73 ML/MIN/1.7M2
GLUCOSE SERPL-MCNC: 108 MG/DL (ref 70–99)
HDLC SERPL-MCNC: 49 MG/DL
LDLC SERPL CALC-MCNC: 56 MG/DL
NONHDLC SERPL-MCNC: 86 MG/DL
POTASSIUM SERPL-SCNC: 4 MMOL/L (ref 3.4–5.3)
PROT SERPL-MCNC: 7.1 G/DL (ref 6.8–8.8)
PSA SERPL-ACNC: 1.36 UG/L (ref 0–4)
SODIUM SERPL-SCNC: 142 MMOL/L (ref 133–144)
TRIGL SERPL-MCNC: 150 MG/DL

## 2018-02-14 PROCEDURE — G0103 PSA SCREENING: HCPCS | Performed by: FAMILY MEDICINE

## 2018-02-14 PROCEDURE — 80053 COMPREHEN METABOLIC PANEL: CPT | Performed by: FAMILY MEDICINE

## 2018-02-14 PROCEDURE — 36415 COLL VENOUS BLD VENIPUNCTURE: CPT | Performed by: FAMILY MEDICINE

## 2018-02-14 PROCEDURE — 80061 LIPID PANEL: CPT | Performed by: FAMILY MEDICINE

## 2018-02-21 ENCOUNTER — OFFICE VISIT (OUTPATIENT)
Dept: FAMILY MEDICINE | Facility: CLINIC | Age: 58
End: 2018-02-21
Payer: COMMERCIAL

## 2018-02-21 VITALS
OXYGEN SATURATION: 97 % | DIASTOLIC BLOOD PRESSURE: 76 MMHG | SYSTOLIC BLOOD PRESSURE: 112 MMHG | BODY MASS INDEX: 29.63 KG/M2 | WEIGHT: 207 LBS | TEMPERATURE: 97.2 F | HEIGHT: 70 IN | HEART RATE: 77 BPM

## 2018-02-21 DIAGNOSIS — E78.5 HYPERLIPIDEMIA LDL GOAL <130: ICD-10-CM

## 2018-02-21 DIAGNOSIS — Z23 NEED FOR PROPHYLACTIC VACCINATION AND INOCULATION AGAINST INFLUENZA: ICD-10-CM

## 2018-02-21 DIAGNOSIS — H66.90 ACUTE OTITIS MEDIA, UNSPECIFIED OTITIS MEDIA TYPE: ICD-10-CM

## 2018-02-21 DIAGNOSIS — Z00.01 ENCOUNTER FOR GENERAL ADULT MEDICAL EXAMINATION WITH ABNORMAL FINDINGS: Primary | ICD-10-CM

## 2018-02-21 DIAGNOSIS — G63 POLYNEUROPATHY IN OTHER DISEASES CLASSIFIED ELSEWHERE (H): ICD-10-CM

## 2018-02-21 PROCEDURE — 99396 PREV VISIT EST AGE 40-64: CPT | Mod: 25 | Performed by: FAMILY MEDICINE

## 2018-02-21 PROCEDURE — 99213 OFFICE O/P EST LOW 20 MIN: CPT | Mod: 25 | Performed by: FAMILY MEDICINE

## 2018-02-21 PROCEDURE — 90471 IMMUNIZATION ADMIN: CPT | Performed by: FAMILY MEDICINE

## 2018-02-21 PROCEDURE — 90688 IIV4 VACCINE SPLT 0.5 ML IM: CPT | Performed by: FAMILY MEDICINE

## 2018-02-21 RX ORDER — CELECOXIB 200 MG/1
CAPSULE ORAL
Qty: 30 CAPSULE | Refills: 11 | Status: SHIPPED | OUTPATIENT
Start: 2018-02-21 | End: 2020-03-02 | Stop reason: ALTCHOICE

## 2018-02-21 RX ORDER — AMOXICILLIN 875 MG
875 TABLET ORAL 2 TIMES DAILY
Qty: 20 TABLET | Refills: 0 | Status: SHIPPED | OUTPATIENT
Start: 2018-02-21 | End: 2018-04-03

## 2018-02-21 RX ORDER — ATORVASTATIN CALCIUM 20 MG/1
20 TABLET, FILM COATED ORAL DAILY
Qty: 90 TABLET | Refills: 3 | Status: SHIPPED | OUTPATIENT
Start: 2018-02-21 | End: 2018-02-21

## 2018-02-21 RX ORDER — METHOCARBAMOL 500 MG/1
500 TABLET, FILM COATED ORAL 2 TIMES DAILY
Qty: 60 TABLET | Refills: 1 | Status: SHIPPED | OUTPATIENT
Start: 2018-02-21 | End: 2018-10-01

## 2018-02-21 RX ORDER — ATORVASTATIN CALCIUM 20 MG/1
20 TABLET, FILM COATED ORAL DAILY
Qty: 90 TABLET | Refills: 3 | Status: SHIPPED | OUTPATIENT
Start: 2018-02-21 | End: 2019-02-22

## 2018-02-21 RX ORDER — OFLOXACIN 3 MG/ML
5 SOLUTION AURICULAR (OTIC) 2 TIMES DAILY
Qty: 10 ML | Refills: 0 | Status: SHIPPED | OUTPATIENT
Start: 2018-02-21 | End: 2019-02-22
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
## 2018-02-21 NOTE — MR AVS SNAPSHOT
After Visit Summary   2/21/2018    Shashi Raza    MRN: [id number]           Patient Information     Date Of Birth          1960        Visit Information        Provider Department      2/21/2018 8:20 AM Yoni Kong MD [facility]        Today's Diagnoses     Encounter for general adult medical examination with abnormal findings    -  1    Acute otitis media, unspecified otitis media type        Hyperlipidemia LDL goal <130        Polyneuropathy in other diseases classified elsewhere (H)        Need for prophylactic vaccination and inoculation against influenza          Care Instructions      Preventive Health Recommendations  Male Ages 50 - 64    Yearly exam:             See your health care provider every year in order to  o   Review health changes.   o   Discuss preventive care.    o   Review your medicines if your doctor has prescribed any.     Have a cholesterol test every 5 years, or more frequently if you are at risk for high cholesterol/heart disease.     Have a diabetes test (fasting glucose) every three years. If you are at risk for diabetes, you should have this test more often.     Have a colonoscopy at age 50, or have a yearly FIT test (stool test). These exams will check for colon cancer.      Talk with your health care provider about whether or not a prostate cancer screening test (PSA) is right for you.    You should be tested each year for STDs (sexually transmitted diseases), if you re at risk.     Shots: Get a flu shot each year. Get a tetanus shot every 10 years.     Nutrition:    Eat at least 5 servings of fruits and vegetables daily.     Eat whole-grain bread, whole-wheat pasta and brown rice instead of white grains and rice.     Talk to your provider about Calcium and Vitamin D.     Lifestyle    Exercise for at least 150 minutes a week (30 minutes a day, 5 days a week). This will help you control your weight and prevent disease.     Limit alcohol to  "one drink per day.     No smoking.     Wear sunscreen to prevent skin cancer.     See your dentist every six months for an exam and cleaning.     See your eye doctor every 1 to 2 years.            Follow-ups after your visit        Who to contact     If you have questions or need follow up information about today's clinic visit or your schedule please contact [facility] directly at [phone].  Normal or non-critical lab and imaging results will be communicated to you by iLinchart, letter or phone within 4 business days after the clinic has received the results. If you do not hear from us within 7 days, please contact the clinic through iLinchart or phone. If you have a critical or abnormal lab result, we will notify you by phone as soon as possible.  Submit refill requests through Sojern or call your pharmacy and they will forward the refill request to us. Please allow 3 business days for your refill to be completed.          Additional Information About Your Visit        iLincharGertrude Information     Sojern lets you send messages to your doctor, view your test results, renew your prescriptions, schedule appointments and more. To sign up, go to [email or web address] . Click on \"Log in\" on the left side of the screen, which will take you to the Welcome page. Then click on \"Sign up Now\" on the right side of the page.     You will be asked to enter the access code listed below, as well as some personal information. Please follow the directions to create your username and password.     Your access code is: [email or web address]  Expires: 2018 11:40 AM     Your access code will  in 90 days. If you need help or a new code, please call your [facility] clinic or [phone].        Care EveryWhere ID     This is your Care EveryWhere ID. This could be used by other organizations to access your [facility] medical records  [id number]        Your Vitals Were     Pulse Temperature Height Pulse Oximetry BMI (Body " "Mass Index)       77 97.2  F (36.2  C) (Temporal) 5' 10.25\" (1.784 m) 97% 29.49 kg/m2        Blood Pressure from Last 3 Encounters:   02/21/18 112/76   06/21/17 120/80   06/07/17 110/68    Weight from Last 3 Encounters:   02/21/18 207 lb (93.9 kg)   06/21/17 208 lb (94.3 kg)   06/07/17 207 lb (93.9 kg)              We Performed the Following     FLU VACCINE, 3 YRS +, IM (QUADRIVALENT W/PRESERVATIVES/MULTI-DOSE) [43228]     OFFICE/OUTPT VISIT,EST,LEVL III     Vaccine Administration, Initial [22634]          Today's Medication Changes          These changes are accurate as of 2/21/18 11:40 AM.  If you have any questions, ask your nurse or doctor.               Start taking these medicines.        Dose/Directions    amoxicillin 875 MG tablet   Commonly known as:  AMOXIL   Used for:  Acute otitis media, unspecified otitis media type   Started by:  Yoni Kong MD        Dose:  875 mg   Take 1 tablet (875 mg) by mouth 2 times daily   Quantity:  20 tablet   Refills:  0       atorvastatin 20 MG tablet   Commonly known as:  LIPITOR   Used for:  Hyperlipidemia LDL goal <130   Started by:  Yoni Kong MD        Dose:  20 mg   Take 1 tablet (20 mg) by mouth daily   Quantity:  90 tablet   Refills:  3       ofloxacin 0.3 % otic solution   Commonly known as:  FLOXIN   Used for:  Acute otitis media, unspecified otitis media type   Started by:  Yoni Kong MD        Dose:  5 drop   Place 5 drops into the right ear 2 times daily   Quantity:  10 mL   Refills:  0            Where to get your medicines      These medications were sent to [facility] [place], [place] - [address]   [address] [place] [address]     Phone:  [phone]     amoxicillin 875 MG tablet    atorvastatin 20 MG tablet    celecoxib 200 MG capsule    methocarbamol 500 MG tablet    ofloxacin 0.3 % otic solution                Primary Care Provider Office Phone # Fax #    Yoni Kong [phone] "       [facility] [address] [place] [address]        Equal Access to Services     ANN TRAN : Hadii aad ku hadfabygurpreet Soeliasali, waaxda luqadaha, qaybta kaalmada mehrdadoleghoracio, waxay idiin haygracielavioleta finleyyueaustin jansen. So [facility] [phone].    ATENCIÓN: Si habla español, tiene a macedo disposición servicios gratuitos de asistencia lingüística. Llame al [phone].    We comply with applicable federal civil rights laws and [place] laws. We do not discriminate on the basis of race, color, national origin, age, disability, sex, sexual orientation, or gender identity.            Thank you!     Thank you for choosing [facility]  for your care. Our goal is always to provide you with excellent care. Hearing back from our patients is one way we can continue to improve our services. Please take a few minutes to complete the written survey that you may receive in the mail after your visit with us. Thank you!             Your Updated Medication List - Protect others around you: Learn how to safely use, store and throw away your medicines at www.disposemymeds.org.          This list is accurate as of 2/21/18 11:40 AM.  Always use your most recent med list.                   Brand Name Dispense Instructions for use Diagnosis    ALEVE PO           amoxicillin 875 MG tablet    AMOXIL    20 tablet    Take 1 tablet (875 mg) by mouth 2 times daily    Acute otitis media, unspecified otitis media type       atorvastatin 20 MG tablet    LIPITOR    90 tablet    Take 1 tablet (20 mg) by mouth daily    Hyperlipidemia LDL goal <130       celecoxib 200 MG capsule    celeBREX    30 capsule    1 Capsule daily    Polyneuropathy in other diseases classified elsewhere (H)       IBU-200 PO      Take 4 tablets by mouth 2 times daily        lidocaine 5 % Patch    LIDODERM    30 patch    Apply up to 3 patches to painful area at once for up to 12 h within a 24 h period.  Remove after 12 hours.    Episodic  tension-type headache, not intractable       methocarbamol 500 MG tablet    ROBAXIN    60 tablet    Take 1 tablet (500 mg) by mouth 2 times daily    Polyneuropathy in other diseases classified elsewhere (H)       ofloxacin 0.3 % otic solution    FLOXIN    10 mL    Place 5 drops into the right ear 2 times daily    Acute otitis media, unspecified otitis media type

## 2018-04-03 ENCOUNTER — OFFICE VISIT (OUTPATIENT)
Dept: FAMILY MEDICINE | Facility: OTHER | Age: 58
End: 2018-04-03
Payer: COMMERCIAL

## 2018-04-03 ENCOUNTER — RADIANT APPOINTMENT (OUTPATIENT)
Dept: GENERAL RADIOLOGY | Facility: OTHER | Age: 58
End: 2018-04-03
Attending: FAMILY MEDICINE
Payer: COMMERCIAL

## 2018-04-03 ENCOUNTER — TELEPHONE (OUTPATIENT)
Dept: FAMILY MEDICINE | Facility: CLINIC | Age: 58
End: 2018-04-03

## 2018-04-03 VITALS
HEART RATE: 92 BPM | DIASTOLIC BLOOD PRESSURE: 82 MMHG | BODY MASS INDEX: 30.2 KG/M2 | SYSTOLIC BLOOD PRESSURE: 116 MMHG | TEMPERATURE: 97.9 F | WEIGHT: 212 LBS | RESPIRATION RATE: 20 BRPM

## 2018-04-03 DIAGNOSIS — R10.32 ABDOMINAL PAIN, LEFT LOWER QUADRANT: ICD-10-CM

## 2018-04-03 DIAGNOSIS — R10.32 ABDOMINAL PAIN, LEFT LOWER QUADRANT: Primary | ICD-10-CM

## 2018-04-03 LAB
ALBUMIN UR-MCNC: NEGATIVE MG/DL
APPEARANCE UR: CLEAR
BASOPHILS # BLD AUTO: 0 10E9/L (ref 0–0.2)
BASOPHILS NFR BLD AUTO: 0.3 %
BILIRUB UR QL STRIP: NEGATIVE
COLOR UR AUTO: YELLOW
DIFFERENTIAL METHOD BLD: NORMAL
EOSINOPHIL # BLD AUTO: 0.2 10E9/L (ref 0–0.7)
EOSINOPHIL NFR BLD AUTO: 2.9 %
ERYTHROCYTE [DISTWIDTH] IN BLOOD BY AUTOMATED COUNT: 13.1 % (ref 10–15)
ERYTHROCYTE [SEDIMENTATION RATE] IN BLOOD BY WESTERGREN METHOD: 2 MM/H (ref 0–20)
GLUCOSE UR STRIP-MCNC: NEGATIVE MG/DL
HCT VFR BLD AUTO: 47.2 % (ref 40–53)
HGB BLD-MCNC: 16.5 G/DL (ref 13.3–17.7)
HGB UR QL STRIP: ABNORMAL
KETONES UR STRIP-MCNC: NEGATIVE MG/DL
LEUKOCYTE ESTERASE UR QL STRIP: NEGATIVE
LYMPHOCYTES # BLD AUTO: 1.8 10E9/L (ref 0.8–5.3)
LYMPHOCYTES NFR BLD AUTO: 23.1 %
MCH RBC QN AUTO: 31.6 PG (ref 26.5–33)
MCHC RBC AUTO-ENTMCNC: 35 G/DL (ref 31.5–36.5)
MCV RBC AUTO: 90 FL (ref 78–100)
MONOCYTES # BLD AUTO: 0.8 10E9/L (ref 0–1.3)
MONOCYTES NFR BLD AUTO: 9.9 %
NEUTROPHILS # BLD AUTO: 4.9 10E9/L (ref 1.6–8.3)
NEUTROPHILS NFR BLD AUTO: 63.8 %
NITRATE UR QL: NEGATIVE
PH UR STRIP: 7 PH (ref 5–7)
PLATELET # BLD AUTO: 153 10E9/L (ref 150–450)
RBC # BLD AUTO: 5.22 10E12/L (ref 4.4–5.9)
RBC #/AREA URNS AUTO: NORMAL /HPF
SOURCE: ABNORMAL
SP GR UR STRIP: 1.02 (ref 1–1.03)
UROBILINOGEN UR STRIP-ACNC: 0.2 EU/DL (ref 0.2–1)
WBC # BLD AUTO: 7.7 10E9/L (ref 4–11)
WBC #/AREA URNS AUTO: NORMAL /HPF

## 2018-04-03 PROCEDURE — 85652 RBC SED RATE AUTOMATED: CPT | Performed by: FAMILY MEDICINE

## 2018-04-03 PROCEDURE — 36415 COLL VENOUS BLD VENIPUNCTURE: CPT | Performed by: FAMILY MEDICINE

## 2018-04-03 PROCEDURE — 99214 OFFICE O/P EST MOD 30 MIN: CPT | Performed by: FAMILY MEDICINE

## 2018-04-03 PROCEDURE — 81001 URINALYSIS AUTO W/SCOPE: CPT | Performed by: FAMILY MEDICINE

## 2018-04-03 PROCEDURE — 74019 RADEX ABDOMEN 2 VIEWS: CPT | Mod: FY

## 2018-04-03 PROCEDURE — 85025 COMPLETE CBC W/AUTO DIFF WBC: CPT | Performed by: FAMILY MEDICINE

## 2018-04-03 RX ORDER — METRONIDAZOLE 500 MG/1
500 TABLET ORAL 2 TIMES DAILY
Qty: 14 TABLET | Refills: 0 | Status: SHIPPED | OUTPATIENT
Start: 2018-04-03 | End: 2018-06-27

## 2018-04-03 RX ORDER — CIPROFLOXACIN 500 MG/1
500 TABLET, FILM COATED ORAL 2 TIMES DAILY
Qty: 14 TABLET | Refills: 0 | Status: SHIPPED | OUTPATIENT
Start: 2018-04-03 | End: 2018-06-27

## 2018-04-03 ASSESSMENT — PAIN SCALES - GENERAL: PAINLEVEL: MODERATE PAIN (4)

## 2018-04-03 NOTE — TELEPHONE ENCOUNTER
Shashi Raza is a 57 year old male who calls with sl;vinit LLQ pain.    NURSING ASSESSMENT:  Description:  Patient is reporting he has LLQ pain that started yesterday and has gotten worse today.  He is reporting he had a BM yesterday.  His pain fluctuates from 4-6/10.  Patient is denying the following: faintness, weakness, radiation of pain, lightheadedness, vomiting, diarrhea, blood in stool, rapidly increasing pain, poor appetite, nausea, fever.  Patient is scheduled in [place] today at 1:30.  He is instructed to go to the ED if he is having worsening symptoms before his appointment.    Onset/duration:  1 day  Precip. factors:  none  Associated symptoms:  See above  Improves/worsens symptoms:  Slightly worsening  Pain scale (0-10)   4/10  Last exam/Treatment:  2/21/18  Allergies:   Allergies   Allergen Reactions     Codeine      NURSING PLAN: Nursing advice to patient none    RECOMMENDED DISPOSITION:  See in 24 hours   Will comply with recommendation: Yes  If further questions/concerns or if symptoms do not improve, worsen or new symptoms develop, call your PCP or [facility] as soon as possible.      Guideline used:  Abdominal Pain, Adult  Telephone Triage Protocols for Nurses, Fifth Edition, Tracy Edgar RN

## 2018-04-03 NOTE — MR AVS SNAPSHOT
"              After Visit Summary   4/3/2018    Shashi Raza    MRN: [id number]           Patient Information     Date Of Birth          1960        Visit Information        Provider Department      4/3/2018 1:30 PM Alex Farmer MD [facility]        Today's Diagnoses     Abdominal pain, left lower quadrant    -  1       Follow-ups after your visit        Who to contact     If you have questions or need follow up information about today's clinic visit or your schedule please contact [facility] directly at [phone].  Normal or non-critical lab and imaging results will be communicated to you by Att[employer] Food[employer]art, letter or phone within 4 business days after the clinic has received the results. If you do not hear from us within 7 days, please contact the clinic through [employer] or phone. If you have a critical or abnormal lab result, we will notify you by phone as soon as possible.  Submit refill requests through [employer] or call your pharmacy and they will forward the refill request to us. Please allow 3 business days for your refill to be completed.          Additional Information About Your Visit        MyChart Information     [employer] lets you send messages to your doctor, view your test results, renew your prescriptions, schedule appointments and more. To sign up, go to [email or web address] . Click on \"Log in\" on the left side of the screen, which will take you to the Welcome page. Then click on \"Sign up Now\" on the right side of the page.     You will be asked to enter the access code listed below, as well as some personal information. Please follow the directions to create your username and password.     Your access code is: [email or web address]  Expires: 2018 12:40 PM     Your access code will  in 90 days. If you need help or a new code, please call your [facility] or [phone].        Care EveryWhere ID     This is your Care EveryWhere ID. This could be used by other " organizations to access your [facility] medical records  [id number]        Your Vitals Were     Pulse Temperature Respirations BMI (Body Mass Index)          92 97.9  F (36.6  C) (Temporal) 20 30.2 kg/m2         Blood Pressure from Last 3 Encounters:   04/03/18 116/82   02/21/18 112/76   06/21/17 120/80    Weight from Last 3 Encounters:   04/03/18 212 lb (96.2 kg)   02/21/18 207 lb (93.9 kg)   06/21/17 208 lb (94.3 kg)              We Performed the Following     *UA reflex to Microscopic     CBC with platelets differential     Erythrocyte sedimentation rate auto     Urine Microscopic          Today's Medication Changes          These changes are accurate as of 4/3/18  1:55 PM.  If you have any questions, ask your nurse or doctor.               Start taking these medicines.        Dose/Directions    ciprofloxacin 500 MG tablet   Commonly known as:  CIPRO   Used for:  Abdominal pain, left lower quadrant   Started by:  Alex Farmer MD        Dose:  500 mg   Take 1 tablet (500 mg) by mouth 2 times daily   Quantity:  14 tablet   Refills:  0       metroNIDAZOLE 500 MG tablet   Commonly known as:  FLAGYL   Used for:  Abdominal pain, left lower quadrant   Started by:  Alex Farmer MD        Dose:  500 mg   Take 1 tablet (500 mg) by mouth 2 times daily   Quantity:  14 tablet   Refills:  0            Where to get your medicines      These medications were sent to [facility] [address]   [address] [place] [address] [facility] [address]     Phone:  [phone]     ciprofloxacin 500 MG tablet    metroNIDAZOLE 500 MG tablet                Primary Care Provider Office Phone # Fax #    Yoni Kong [phone]       [address] [facility] [address]        Equal Access to Services     LUCIANO TRAN : Linh More, rafiq shepherd, ray farris. [facility] [phone].    ATENCIÓN: Lucia yang  español, tiene a macedo disposición servicios gratuitos de asistencia lingüística. Valery lopez [phone].    We comply with applicable federal civil rights laws and [place] laws. We do not discriminate on the basis of race, color, national origin, age, disability, sex, sexual orientation, or gender identity.            Thank you!     Thank you for choosing [facility]  for your care. Our goal is always to provide you with excellent care. Hearing back from our patients is one way we can continue to improve our services. Please take a few minutes to complete the written survey that you may receive in the mail after your visit with us. Thank you!             Your Updated Medication List - Protect others around you: Learn how to safely use, store and throw away your medicines at www.disposemymeds.org.          This list is accurate as of 4/3/18  1:55 PM.  Always use your most recent med list.                   Brand Name Dispense Instructions for use Diagnosis    atorvastatin 20 MG tablet    LIPITOR    90 tablet    Take 1 tablet (20 mg) by mouth daily    Hyperlipidemia LDL goal <130       celecoxib 200 MG capsule    celeBREX    30 capsule    1 Capsule daily    Polyneuropathy in other diseases classified elsewhere (H)       ciprofloxacin 500 MG tablet    CIPRO    14 tablet    Take 1 tablet (500 mg) by mouth 2 times daily    Abdominal pain, left lower quadrant       IBU-200 PO      Take 4 tablets by mouth 2 times daily        lidocaine 5 % Patch    LIDODERM    30 patch    Apply up to 3 patches to painful area at once for up to 12 h within a 24 h period.  Remove after 12 hours.    Episodic tension-type headache, not intractable       methocarbamol 500 MG tablet    ROBAXIN    60 tablet    Take 1 tablet (500 mg) by mouth 2 times daily    Polyneuropathy in other diseases classified elsewhere (H)       metroNIDAZOLE 500 MG tablet    FLAGYL    14 tablet    Take 1 tablet (500 mg) by mouth 2 times daily    Abdominal  pain, left lower quadrant       ofloxacin 0.3 % otic solution    FLOXIN    10 mL    Place 5 drops into the right ear 2 times daily    Acute otitis media, unspecified otitis media type
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
## 2018-05-18 ENCOUNTER — OFFICE VISIT (OUTPATIENT)
Dept: URGENT CARE | Facility: RETAIL CLINIC | Age: 58
End: 2018-05-18
Payer: COMMERCIAL

## 2018-05-18 VITALS
HEART RATE: 74 BPM | OXYGEN SATURATION: 96 % | TEMPERATURE: 96.9 F | DIASTOLIC BLOOD PRESSURE: 78 MMHG | SYSTOLIC BLOOD PRESSURE: 124 MMHG

## 2018-05-18 DIAGNOSIS — H57.89 IRRITATION OF RIGHT EYE: ICD-10-CM

## 2018-05-18 DIAGNOSIS — H10.9 CONJUNCTIVITIS OF RIGHT EYE, UNSPECIFIED CONJUNCTIVITIS TYPE: Primary | ICD-10-CM

## 2018-05-18 PROCEDURE — 99203 OFFICE O/P NEW LOW 30 MIN: CPT | Performed by: PHYSICIAN ASSISTANT

## 2018-05-18 RX ORDER — POLYMYXIN B SULFATE AND TRIMETHOPRIM 1; 10000 MG/ML; [USP'U]/ML
1 SOLUTION OPHTHALMIC 4 TIMES DAILY
Qty: 2 ML | Refills: 0 | Status: SHIPPED | OUTPATIENT
Start: 2018-05-18 | End: 2018-05-25

## 2018-05-18 NOTE — MR AVS SNAPSHOT
After Visit Summary   5/18/2018    Shashi Raza    MRN: [id number]           Patient Information     Date Of Birth          1960        Visit Information        Provider Department      5/18/2018 7:30 PM Tabitha Garcia PA-C [facility]        Today's Diagnoses     Irritation of right eye    -  1    Conjunctivitis of right eye, unspecified conjunctivitis type          Care Instructions      Follow up with your primary care provider or eye clinic if worsening symptoms, not improving or if symptoms do not resolve.  [facility]  [phone]    Bacterial Conjunctivitis    You have an infection in the membranes covering the white part of the eye. This part of the eye is called the conjunctiva. The infection is called conjunctivitis. The most common symptoms of conjunctivitis include a thick, pus-like discharge from the eye, swollen eyelids, redness, eyelids sticking together upon awakening, and a gritty or scratchy feeling in the eye. Your infection was caused by bacteria. It may be treated with medicine. With treatment, the infection takes about 7 to 10 days to resolve.  Home care    Use prescribed antibiotic eye drops or ointment as directed to treat the infection.    Apply a warm compress (towel soaked in warm water) to the affected eye 3 to 4 times a day. Do this just before applying medicine to the eye.    Use a warm, wet cloth to wipe away crusting of the eyelids in the morning. This is caused by mucus drainage during the night. You may also use saline irrigating solution or artificial tears to rinse away mucus in the eye. Do not put a patch over the eye.    Wash your hands before and after touching the infected eye. This is to prevent spreading the infection to the other eye, and to other people. Don't share your towels or washcloths with others.    You may use acetaminophen or ibuprofen to control pain, unless another medicine was prescribed. (Note: If  you have chronic liver or kidney disease or have ever had a stomach ulcer or gastrointestinal bleeding, talk with your doctor before using these medicines.)    Don't wear contact lenses until your eyes have healed and all symptoms are gone.  Follow-up care  Follow up with your healthcare provider, or as advised.  When to seek medical advice  Call your healthcare provider right away if any of these occur:    Worsening vision    Increasing pain in the eye    Increasing swelling or redness of the eyelid    Redness spreading around the eye  Date Last Reviewed: 7/1/2017 2000-2017 [employer]. [address] [place] [address]. All rights reserved. This information is not intended as a substitute for professional medical care. Always follow your healthcare professional's instructions.        Conjunctivitis, Nonspecific    The membrane that covers the white part of your eye (the conjunctiva) is inflamed. Inflammation happens when your body responds to an injury, allergic reaction, infection, or illness. Symptoms of inflammation in the eye may include redness, irritation, itching, swelling, or burning. These symptoms should go away within the next 24 hours. Conjunctivitis may be related to a particle that was in your eye. If so, it may wash out with your tears or irrigation treatment. Being exposed to liquid chemicals or fumes may also cause this reaction.   Home care    Apply a cold pack over the eye for 20 minutes at a time. This will reduce pain. To make a cold pack, put ice cubes in a plastic bag that seals at the top. Wrap the bag in a clean, thin towel or cloth.    Artificial tears may be prescribed to reduce irritation or redness.  These should be used 3 to 4 times a day.    You may use acetaminophen or ibuprofen to control pain, unless another medicine was prescribed. (Note: If you have chronic liver or kidney disease, or if you have ever had a stomach ulcer or gastrointestinal bleeding,  "talk with your healthcare provider before using these medicines.)  Follow-up care  Follow up with your healthcare provider, or as advised.  When to seek medical advice  Call your healthcare provider right away if any of these occur:    Increased eyelid swelling    Increased eye pain    Increased redness or drainage from the eye    Increased blurry vision or increased sensitivity to light    Failure of normal vision to return within 24 to 48 hours  Date Last Reviewed: 2017-2017 "[employer]. [address] [place] [address]. All rights reserved. This information is not intended as a substitute for professional medical care. Always follow your healthcare professional's instructions.                Follow-ups after your visit        Who to contact     You can reach your care team any time of the day by calling [phone].  Notification of test results:  If you have an abnormal lab result, we will notify you by phone as soon as possible.         Additional Information About Your Visit        MyChart Information     [employer] lets you send messages to your doctor, view your test results, renew your prescriptions, schedule appointments and more. To sign up, go to [email or web address] . Click on \"Log in\" on the left side of the screen, which will take you to the Welcome page. Then click on \"Sign up Now\" on the right side of the page.     You will be asked to enter the access code listed below, as well as some personal information. Please follow the directions to create your username and password.     Your access code is: [email or web address]  Expires: 2018 12:40 PM     Your access code will  in 90 days. If you need help or a new code, please call your [place] [facility] [place] clinic or [phone].        Care EveryWhere ID     This is your Care EveryWhere ID. This could be used by other organizations to access your [facility] medical records  [id number]        Your Vitals Were     Pulse " Temperature Pulse Oximetry             74 96.9  F (36.1  C) (Temporal) 96%          Blood Pressure from Last 3 Encounters:   05/18/18 124/78   04/03/18 116/82   02/21/18 112/76    Weight from Last 3 Encounters:   04/03/18 212 lb (96.2 kg)   02/21/18 207 lb (93.9 kg)   06/21/17 208 lb (94.3 kg)              Today, you had the following     No orders found for display         Today's Medication Changes          These changes are accurate as of 5/18/18  7:46 PM.  If you have any questions, ask your nurse or doctor.               Start taking these medicines.        Dose/Directions    trimethoprim-polymyxin b ophthalmic solution   Commonly known as:  POLYTRIM   Used for:  Conjunctivitis of right eye, unspecified conjunctivitis type   Started by:  Tabitha Garcia PA-C        Dose:  1 drop   Place 1 drop into the right eye 4 times daily for 7 days   Quantity:  2 mL   Refills:  0            Where to get your medicines      These medications were sent to [address]  [address] [facility] [address]     Phone:  [phone]     trimethoprim-polymyxin b ophthalmic solution                Primary Care Provider Office Phone # Fax #    Yoni Kong [phone] [address] [facility] [address]        Equal Access to Services     ANN TRAN AH: Hadii aad ku hadasho Soomaali, waaxda luqadaha, qaybta kaalmada adeegyada, ray jansen. So [facility] [phone].    ATENCIÓN: Si habla español, tiene a macedo disposición servicios gratuitos de asistencia lingüística. Llame al [phone].    We comply with applicable federal civil rights laws and [place] laws. We do not discriminate on the basis of race, color, national origin, age, disability, sex, sexual orientation, or gender identity.            Thank you!     Thank you for choosing [facility]  for your care. Our goal is always to provide you with excellent  care. Hearing back from our patients is one way we can continue to improve our services. Please take a few minutes to complete the written survey that you may receive in the mail after your visit with us. Thank you!             Your Updated Medication List - Protect others around you: Learn how to safely use, store and throw away your medicines at www.disposemymeds.org.          This list is accurate as of 5/18/18  7:46 PM.  Always use your most recent med list.                   Brand Name Dispense Instructions for use Diagnosis    atorvastatin 20 MG tablet    LIPITOR    90 tablet    Take 1 tablet (20 mg) by mouth daily    Hyperlipidemia LDL goal <130       celecoxib 200 MG capsule    celeBREX    30 capsule    1 Capsule daily    Polyneuropathy in other diseases classified elsewhere (H)       ciprofloxacin 500 MG tablet    CIPRO    14 tablet    Take 1 tablet (500 mg) by mouth 2 times daily    Abdominal pain, left lower quadrant       IBU-200 PO      Take 4 tablets by mouth 2 times daily        lidocaine 5 % Patch    LIDODERM    30 patch    Apply up to 3 patches to painful area at once for up to 12 h within a 24 h period.  Remove after 12 hours.    Episodic tension-type headache, not intractable       methocarbamol 500 MG tablet    ROBAXIN    60 tablet    Take 1 tablet (500 mg) by mouth 2 times daily    Polyneuropathy in other diseases classified elsewhere (H)       metroNIDAZOLE 500 MG tablet    FLAGYL    14 tablet    Take 1 tablet (500 mg) by mouth 2 times daily    Abdominal pain, left lower quadrant       ofloxacin 0.3 % otic solution    FLOXIN    10 mL    Place 5 drops into the right ear 2 times daily    Acute otitis media, unspecified otitis media type       trimethoprim-polymyxin b ophthalmic solution    POLYTRIM    2 mL    Place 1 drop into the right eye 4 times daily for 7 days    Conjunctivitis of right eye, unspecified conjunctivitis type

## 2018-05-19 NOTE — PATIENT INSTRUCTIONS
Follow up with your primary care provider or eye clinic if worsening symptoms, not improving or if symptoms do not resolve.  [facility]  [phone]    Bacterial Conjunctivitis    You have an infection in the membranes covering the white part of the eye. This part of the eye is called the conjunctiva. The infection is called conjunctivitis. The most common symptoms of conjunctivitis include a thick, pus-like discharge from the eye, swollen eyelids, redness, eyelids sticking together upon awakening, and a gritty or scratchy feeling in the eye. Your infection was caused by bacteria. It may be treated with medicine. With treatment, the infection takes about 7 to 10 days to resolve.  Home care    Use prescribed antibiotic eye drops or ointment as directed to treat the infection.    Apply a warm compress (towel soaked in warm water) to the affected eye 3 to 4 times a day. Do this just before applying medicine to the eye.    Use a warm, wet cloth to wipe away crusting of the eyelids in the morning. This is caused by mucus drainage during the night. You may also use saline irrigating solution or artificial tears to rinse away mucus in the eye. Do not put a patch over the eye.    Wash your hands before and after touching the infected eye. This is to prevent spreading the infection to the other eye, and to other people. Don't share your towels or washcloths with others.    You may use acetaminophen or ibuprofen to control pain, unless another medicine was prescribed. (Note: If you have chronic liver or kidney disease or have ever had a stomach ulcer or gastrointestinal bleeding, talk with your doctor before using these medicines.)    Don't wear contact lenses until your eyes have healed and all symptoms are gone.  Follow-up care  Follow up with your healthcare provider, or as advised.  When to seek medical advice  Call your healthcare provider right away if any of these occur:    Worsening  vision    Increasing pain in the eye    Increasing swelling or redness of the eyelid    Redness spreading around the eye  Date Last Reviewed: 7/1/2017 2000-2017 [employer]. [address] [place] [address], [place] [address]. All rights reserved. This information is not intended as a substitute for professional medical care. Always follow your healthcare professional's instructions.        Conjunctivitis, Nonspecific    The membrane that covers the white part of your eye (the conjunctiva) is inflamed. Inflammation happens when your body responds to an injury, allergic reaction, infection, or illness. Symptoms of inflammation in the eye may include redness, irritation, itching, swelling, or burning. These symptoms should go away within the next 24 hours. Conjunctivitis may be related to a particle that was in your eye. If so, it may wash out with your tears or irrigation treatment. Being exposed to liquid chemicals or fumes may also cause this reaction.   Home care    Apply a cold pack over the eye for 20 minutes at a time. This will reduce pain. To make a cold pack, put ice cubes in a plastic bag that seals at the top. Wrap the bag in a clean, thin towel or cloth.    Artificial tears may be prescribed to reduce irritation or redness.  These should be used 3 to 4 times a day.    You may use acetaminophen or ibuprofen to control pain, unless another medicine was prescribed. (Note: If you have chronic liver or kidney disease, or if you have ever had a stomach ulcer or gastrointestinal bleeding, talk with your healthcare provider before using these medicines.)  Follow-up care  Follow up with your healthcare provider, or as advised.  When to seek medical advice  Call your healthcare provider right away if any of these occur:    Increased eyelid swelling    Increased eye pain    Increased redness or drainage from the eye    Increased blurry vision or increased sensitivity to light    Failure of normal vision to  return within 24 to 48 hours  Date Last Reviewed: 7/1/2017 2000-2017 [employer]. [address] [place] [address], [place] [address]. All rights reserved. This information is not intended as a substitute for professional medical care. Always follow your healthcare professional's instructions.
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
## 2018-06-22 ENCOUNTER — OFFICE VISIT (OUTPATIENT)
Dept: FAMILY MEDICINE | Facility: CLINIC | Age: 58
End: 2018-06-22
Payer: COMMERCIAL

## 2018-06-22 ENCOUNTER — HOSPITAL ENCOUNTER (OUTPATIENT)
Dept: GENERAL RADIOLOGY | Facility: CLINIC | Age: 58
Discharge: HOME | End: 2018-06-22
Attending: FAMILY MEDICINE | Admitting: FAMILY MEDICINE
Payer: COMMERCIAL

## 2018-06-22 VITALS
BODY MASS INDEX: 29.41 KG/M2 | SYSTOLIC BLOOD PRESSURE: 104 MMHG | TEMPERATURE: 97.5 F | OXYGEN SATURATION: 97 % | WEIGHT: 205.4 LBS | HEIGHT: 70 IN | DIASTOLIC BLOOD PRESSURE: 70 MMHG | HEART RATE: 85 BPM

## 2018-06-22 DIAGNOSIS — M25.511 CHRONIC RIGHT SHOULDER PAIN: ICD-10-CM

## 2018-06-22 DIAGNOSIS — G89.29 CHRONIC RIGHT SHOULDER PAIN: ICD-10-CM

## 2018-06-22 DIAGNOSIS — M21.612 BUNION, LEFT FOOT: ICD-10-CM

## 2018-06-22 DIAGNOSIS — M25.511 CHRONIC RIGHT SHOULDER PAIN: Primary | ICD-10-CM

## 2018-06-22 DIAGNOSIS — G89.29 CHRONIC RIGHT SHOULDER PAIN: Primary | ICD-10-CM

## 2018-06-22 PROCEDURE — 73030 X-RAY EXAM OF SHOULDER: CPT | Mod: TC

## 2018-06-22 PROCEDURE — 99214 OFFICE O/P EST MOD 30 MIN: CPT | Performed by: FAMILY MEDICINE

## 2018-06-22 NOTE — MR AVS SNAPSHOT
After Visit Summary   6/22/2018    Shashi Raza    MRN: [id number]           Patient Information     Date Of Birth          1960        Visit Information        Provider Department      6/22/2018 8:40 AM Yoni Kong MD [facility]        Today's Diagnoses     Chronic right shoulder pain    -  1    Bunion, left foot           Follow-ups after your visit        Additional Services     ORTHOPEDICS ADULT REFERRAL       Your provider has referred you to: [facility]: [facility] [phone]   [email or web address]    Please be aware that coverage of these services is subject to the terms and limitations of your health insurance plan.  Call member services at your health plan with any benefit or coverage questions.      Please bring the following to your appointment:    >>   Any x-rays, CTs or MRIs which have been performed.  Contact the facility where they were done to arrange for  prior to your scheduled appointment.    >>   List of current medications   >>   This referral request   >>   Any documents/labs given to you for this referral            PODIATRY/FOOT & ANKLE SURGERY REFERRAL       Your provider has referred you to: [facility]: [facility] [phone]   [email or web address]    Please be aware that coverage of these services is subject to the terms and limitations of your health insurance plan.  Call member services at your health plan with any benefit or coverage questions.      Please bring the following to your appointment:  >>   Any x-rays, CTs or MRIs which have been performed.  Contact the facility where they were done to arrange for  prior to your scheduled appointment.    >>   List of current medications   >>   This referral request   >>   Any documents/labs given to you for this referral                  Your next 10 appointments already  "scheduled     Jun 27, 2018  2:40 PM CDT   New Visit with Taryn Bruce MD   [facility] ([facility])    [address] [place] [id number] [phone]            Jun 27, 2018  3:30 PM CDT   New Visit with Selvin Ma DPM   [facility] ([facility])    [address] [place] [phone] [phone]              Future tests that were ordered for you today     Open Future Orders        Priority Expected Expires Ordered    XR Shoulder Right G/E 3 Views Routine 6/22/2018 6/22/2019 6/22/2018            Who to contact     If you have questions or need follow up information about today's clinic visit or your schedule please contact [facility] directly at [phone].  Normal or non-critical lab and imaging results will be communicated to you by MyChart, letter or phone within 4 business days after the clinic has received the results. If you do not hear from us within 7 days, please contact the clinic through MyChart or phone. If you have a critical or abnormal lab result, we will notify you by phone as soon as possible.  Submit refill requests through [employer] or call your pharmacy and they will forward the refill request to us. Please allow 3 business days for your refill to be completed.          Additional Information About Your Visit        Care EveryWhere ID     This is your Care EveryWhere ID. This could be used by other organizations to access your [facility] medical records  [id number]        Your Vitals Were     Pulse Temperature Height Pulse Oximetry BMI (Body Mass Index)       85 97.5  F (36.4  C) (Temporal) 5' 10.25\" (1.784 m) 97% 29.26 kg/m2        Blood Pressure from Last 3 Encounters:   06/22/18 104/70   05/18/18 124/78   04/03/18 116/82    Weight from Last 3 Encounters:   06/22/18 205 lb 6.4 oz (93.2 kg)   04/03/18 212 lb (96.2 kg)   02/21/18 207 lb (93.9 kg)              We Performed the " Following     ORTHOPEDICS ADULT REFERRAL     PODIATRY/FOOT & ANKLE SURGERY REFERRAL        Primary Care Provider Office Phone # Fax #    Yoni Kong [phone]       [address] [facility] [phone]        Equal Access to Services     PINKYLUCIANO MARC : Hadii bobby ku hadfabyo Soomaali, waaxda luqadaha, qaybta kaalmada adeegyada, ray barron haygracielan mehrdad olivares arianna jansen. So [facility] [phone].    ATENCIÓN: Si habla español, tiene a macedo disposición servicios gratuitos de asistencia lingüística. Llame al [phone].    We comply with applicable federal civil rights laws and [place] laws. We do not discriminate on the basis of race, color, national origin, age, disability, sex, sexual orientation, or gender identity.            Thank you!     Thank you for choosing [facility]  for your care. Our goal is always to provide you with excellent care. Hearing back from our patients is one way we can continue to improve our services. Please take a few minutes to complete the written survey that you may receive in the mail after your visit with us. Thank you!             Your Updated Medication List - Protect others around you: Learn how to safely use, store and throw away your medicines at www.disposemymeds.org.          This list is accurate as of 6/22/18  9:33 AM.  Always use your most recent med list.                   Brand Name Dispense Instructions for use Diagnosis    atorvastatin 20 MG tablet    LIPITOR    90 tablet    Take 1 tablet (20 mg) by mouth daily    Hyperlipidemia LDL goal <130       celecoxib 200 MG capsule    celeBREX    30 capsule    1 Capsule daily    Polyneuropathy in other diseases classified elsewhere (H)       ciprofloxacin 500 MG tablet    CIPRO    14 tablet    Take 1 tablet (500 mg) by mouth 2 times daily    Abdominal pain, left lower quadrant       IBU-200 PO      Take 4 tablets by mouth 2 times daily        lidocaine 5 % Patch    LIDODERM    30 patch     Apply up to 3 patches to painful area at once for up to 12 h within a 24 h period.  Remove after 12 hours.    Episodic tension-type headache, not intractable       methocarbamol 500 MG tablet    ROBAXIN    60 tablet    Take 1 tablet (500 mg) by mouth 2 times daily    Polyneuropathy in other diseases classified elsewhere (H)       metroNIDAZOLE 500 MG tablet    FLAGYL    14 tablet    Take 1 tablet (500 mg) by mouth 2 times daily    Abdominal pain, left lower quadrant       ofloxacin 0.3 % otic solution    FLOXIN    10 mL    Place 5 drops into the right ear 2 times daily    Acute otitis media, unspecified otitis media type
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
## 2018-06-25 NOTE — PROGRESS NOTES
Shashi Raza is a 57 year old male who is seen in consultation at the request of Dr. Yoni Kong MD  .  History of Present illness:  Shashi presents for evaluation of:  1.) rt shoulder  Onset: off and on for years  Symptoms brought on by: nothing.   Character:  sharp and dull ache.    Progression of symptoms:  worse.    Previous similar pain: YES.   Pain Level:  5/10.   Previous treatments:  acetaminophen, Ibuprofen and NSAID - aleve.  Currently on Blood thinners? No  Diagnosis of Diabetes? No

## 2018-06-27 ENCOUNTER — RADIANT APPOINTMENT (OUTPATIENT)
Dept: GENERAL RADIOLOGY | Facility: CLINIC | Age: 58
End: 2018-06-27
Attending: PODIATRIST
Payer: COMMERCIAL

## 2018-06-27 ENCOUNTER — OFFICE VISIT (OUTPATIENT)
Dept: PODIATRY | Facility: CLINIC | Age: 58
End: 2018-06-27
Payer: COMMERCIAL

## 2018-06-27 ENCOUNTER — OFFICE VISIT (OUTPATIENT)
Dept: ORTHOPEDICS | Facility: CLINIC | Age: 58
End: 2018-06-27
Payer: COMMERCIAL

## 2018-06-27 VITALS
HEIGHT: 71 IN | WEIGHT: 205 LBS | BODY MASS INDEX: 28.7 KG/M2 | SYSTOLIC BLOOD PRESSURE: 108 MMHG | DIASTOLIC BLOOD PRESSURE: 77 MMHG

## 2018-06-27 VITALS
BODY MASS INDEX: 28.7 KG/M2 | SYSTOLIC BLOOD PRESSURE: 108 MMHG | WEIGHT: 205 LBS | HEIGHT: 71 IN | DIASTOLIC BLOOD PRESSURE: 77 MMHG

## 2018-06-27 DIAGNOSIS — M20.12 HALLUX VALGUS OF LEFT FOOT: ICD-10-CM

## 2018-06-27 DIAGNOSIS — M25.775 OSTEOPHYTE, LEFT FOOT: ICD-10-CM

## 2018-06-27 DIAGNOSIS — M20.5X2 HALLUX LIMITUS OF LEFT FOOT: ICD-10-CM

## 2018-06-27 DIAGNOSIS — M75.81 RIGHT ROTATOR CUFF TENDINITIS: Primary | ICD-10-CM

## 2018-06-27 DIAGNOSIS — M20.12 HALLUX VALGUS OF LEFT FOOT: Primary | ICD-10-CM

## 2018-06-27 PROCEDURE — 99203 OFFICE O/P NEW LOW 30 MIN: CPT | Performed by: PODIATRIST

## 2018-06-27 PROCEDURE — 20610 DRAIN/INJ JOINT/BURSA W/O US: CPT | Mod: RT | Performed by: ORTHOPAEDIC SURGERY

## 2018-06-27 PROCEDURE — 73630 X-RAY EXAM OF FOOT: CPT | Mod: TC

## 2018-06-27 PROCEDURE — 99214 OFFICE O/P EST MOD 30 MIN: CPT | Mod: 25 | Performed by: ORTHOPAEDIC SURGERY

## 2018-06-27 RX ORDER — MELOXICAM 15 MG/1
15 TABLET ORAL DAILY
Qty: 60 TABLET | Refills: 1 | Status: SHIPPED | OUTPATIENT
Start: 2018-06-27 | End: 2018-10-01

## 2018-06-27 ASSESSMENT — PAIN SCALES - GENERAL
PAINLEVEL: MODERATE PAIN (5)
PAINLEVEL: NO PAIN (0)
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
## 2018-06-27 NOTE — MR AVS SNAPSHOT
After Visit Summary   6/27/2018    Shashi Raza    MRN: [id number]           Patient Information     Date Of Birth          1960        Visit Information        Provider Department      6/27/2018 2:40 PM Taryn Bruce MD [facility]        Today's Diagnoses     Right rotator cuff tendinitis    -  1      Care Instructions    Encounter Diagnosis   Name Primary?     Right rotator cuff tendinitis Yes     Rest, ice and elevate above heart level as needed for pain control  1. Xrays: Your x-rays look excellent today, no fracture no dislocation no tumor. We just see some arthritis of your AC joint but you are not having pain there.  2.  We can tell on examination that you have good strength and we do not suspect that your rotator cuff is torn.  3.  However we can also tell on examination and from your history that your rotator cuff tendons are irritated.  4. We decided to do a cortisone injection today.  This is like putting very powerful ibuprofen right to the area it is needed.  We have information about the injection below.   5. We can do 4 cortisone injections in a years time but no more than that. We can do the first 3 injections within 6 weeks of each other but the fourth injection we usually wait 2 months. This helps protect the cartilage in your joint.  6. Mobic: We discussed the Mobic as a prescription anti-inflammatory medication you could also try but we decided to hold off on that today.    7.  We have exercises below that you can work on.  Start once her shoulder feels better and start with the stretches and then progressed to the strengthening as her shoulder tolerates it.  We talked about formal physical therapy, but decided to hold for now.     8. Listen to your body and let the pain guide your activity, as in slow down if you are having a lot of pain, but increase activity gradually as the pain decreases.   9. You can followup with Taryn Bruce MD in 6  weeks, if you are having pain at that time we can do a cortisone injection.  You can always cancel the appointment if you are doing really well and follow-up as needed.   Cortisone Instructions:     1. You received an injection of cortisone into your right shoulder today.  2. The joint(s) may be more painful for the first 1-2 days.  3. We ask you to continue to rest the joint(s) for a few more days before resuming regular activities.  4. Pain Medications you can take (as long as your primary care provider allows these meds and you do not have kidney or liver conditions):  Tylenol  Take 1000 mg by mouth every 6 hours as needed; maximum dose 4000 mg a day  Ibuprofen  600 mg every 6 hours as needed; maximum 2400 mg a day  (OK to take tylenol and ibuprofen at the same time)  5. Rest, ice and elevate as needed for pain control  6. Watch for these signs of infection: redness, swelling, drainage, warmth to touch, increased pain, or fever. Call the clinic or make an appointment to be seen if you think you have an infection.  7. If you are diabetic, make sure you keep a close eye on your blood sugars, they can get elevated with cortisone injections.   8. Sometimes it can take 1-2 weeks for it to reach its full effect.    Cortisone Injections  Cortisone is a type of steroid. It can greatly reduce swelling, redness, and irritation (inflammation) and pain. Being injected with cortisone is simple and doesn t take long. Your doctor may ask you questions about your health. Certain health conditions, such as diabetes, can be affected by cortisone.     Your pain may be relieved by a cortisone injection.   Why have a cortisone injection?  Injecting cortisone can relieve pain for anything from a sports injury to arthritis. Your doctor may suggest an injection if rest, splints, or oral medicine doesn t relieve your pain. Injecting cortisone is simpler than having surgery. And cortisone may provide the lasting pain relief that can help  you get out and enjoy life again.  Getting the injection  Your doctor will start by cleaning and occasionally numbing your skin at the injection site. Next you ll be injected with local anesthetics (for short-term pain relief) and cortisone. The injection may last a few moments. A small bandage will be put over the injection site. You ll then be ready to go home.  After your injection  After being injected, make sure you don t injure the treated region. But stay active. Enjoy a walk or some other mild activity. Just be careful not to strain the region that gave you trouble.  The next day  Some people feel more pain after being injected. This is normal, and it will go away soon. Applying ice for 20 minutes at a time to your injury may reduce the increased pain. Rest for the first day or two. You don t need to stay in bed. But avoid tasks that may strain the injured region.  If you have diabetes  Cortisone injections can cause blood sugar to be increased for several days after the injection. If you have diabetes, you should follow your blood  sugar closely during this time. Follow your regular plan for what to do when your blood sugar is elevated.     9588-3463 The [employer]. [address] [place], [place] [address]. All rights reserved. This information is not intended as a substitute for professional medical care. Always follow your healthcare professional's instructions.     Understanding Rotator Cuff Injuries  The rotator cuff is a team of muscles and connecting tendons in the shoulder. It attaches your upper arm to your shoulder blade. Your rotator cuff helps you reach, throw, push, pull, and lift. Without it, your shoulder can't do its job properly.        Overuse tendonitis is irritation, bruising, or fraying of the rotator cuff.        A healthy rotator cuff  A healthy rotator cuff gives your shoulder flexibility and control. The rotator cuff holds your upper arm bone (humerus) in your shoulder  socket (glenoid). It also helps move the shoulder.  A damaged rotator cuff  Pain and weakness told you that something was wrong with your shoulder. Now you know it s a rotator cuff problem. Rotator cuff tendons can become damaged or inflamed. This is called tendonitis. Possible causes include:    Irritation from overuse    Bursal inflammation (bursitis)    Pinching (impingement)    Calcium deposits (calcification)    Tears in the tendon.  Getting your shoulder healthy again  Care for your shoulder will most likely begin with nonsurgical treatments. You may start with simple rest. If needed, you may have injections that decrease inflammation and pain. Your healthcare provider will tell you how often you may need these treatments. If rest and injections relieve your pain, you will be given an exercise program. This will help restore your shoulder s strength and function. If your pain continues, your healthcare provider may suggest surgery to repair the rotator cuff.    1857-8903 [employer]. [address] [place] [address]. All rights reserved. This information is not intended as a substitute for professional medical care. Always follow your healthcare professional's instructions.      Tendonitis  A tendon is the thick fibrous cord that joins muscle to bone and allows joints to move. When a tendon becomes inflamed, it is called tendonitis. This can occur from overuse, injury, or infection. This usually involves the shoulders, forearm, wrist, hands and foot. Symptoms include pain, swelling and tenderness to the touch. Moving the joint increases the pain.  It takes 4 to 6 weeks for tendonitis to heal. It is treated by preventing motion of the tendon with a splint or brace and the use of anti-inflammatory medicine.  Home care    Some people find relief with ice packs. These can be crushed or cubed ice in a plastic bag or a bag of frozen vegetables wrapped in a thin towel. Other people get better  relief with heat. This can include a hot shower, hot bath, or a moist towel warmed in a microwave. Try each and use the method that feels best, for 15 to 20 minutes several times a day.    Rest the inflamed joint and protect it from movement.    You may use over-the-counter ibuprofen or naproxen to treat pain and inflammation, unless another medicine was prescribed. If you can't take these medicines, acetaminophen may help with the pain, but does not treat inflammation. If you have chronic liver or kidney disease or ever had a stomach ulcer or gastrointestinal bleeding, talk with your doctor before using these medicines.    As your symptoms improve, begin gradual motion at the involved joint.  Follow-up care  Follow up with your healthcare provider if you are not improving after 5 days of treatment.  When to seek medical advice  Call your healthcare provider right away if any of these occur:    Redness over the painful area    Increasing pain or swelling at the joint    Fever (1 degree above your normal temperature) lasting 24 to 48 hours Or, whatever your healthcare provider told you to report based on your condition    8582-5644 The [employer]. [address] [place] [address]. All rights reserved. This information is not intended as a substitute for professional medical care. Always follow your healthcare professional's instructions.      Exercises for Shoulder Flexibility: External Rotation  This stretch can help restore shoulder flexibility and relieve pain over time. When stretching, be sure to breathe deeply. Follow any special instructions from your doctor or physical therapist:     a doorway. Grasp the doorjamb with the hand on the frozen side. Your arm should be bent.    With the other hand, hold the elbow on the frozen side firmly against your body.    Standing in the same spot, rotate your body away from the doorjamb. Stop when you feel the stretch in the shoulder. At first,  try to hold the stretch for 5 seconds.    Work up to doing 3 sets of this stretch, 3 times a day. Work up to holding the stretch for 30 to 60 seconds.  Note: Keep your arms as still as you can. Over time, rotate your body a little more to enhance the stretch. But be careful not to twist your back.        Exercises for Shoulder Flexibility: Internal Rotation    This stretch can help restore shoulder flexibility and relieve pain over time. When stretching, be sure to breathe deeply. Follow any special instructions from your healthcare provider or physical therapist.    While seated, move the arm on the side you want to stretch toward the middle of your back. The palm of your hand should face out.    Cup your other hand under the hand that s behind your back. Gently push your cupped hand upward until you feel the stretch in the shoulder. Try to hold the stretch for 5 seconds.    Work up to doing 3 sets of this stretch, 3 times a day. Work up to holding the stretch for 30 to 60 seconds.  Note: Keep your back straight. It s OK if your hand can t reach the middle of your back. Instead, start the stretch with your hand as close as you can get it to the middle of your back.      Exercises for Shoulder Flexibility: Wall Walk  Improving your flexibility can reduce pain. Stretching exercises also can help increase your range of pain-free motion. Breathe normally when you exercise. Use smooth, fluid movements.  Note: Follow any special instructions you are given. If you feel pain, stop the exercise. If the pain continues after stopping, call your healthcare provider:    Stand with your shoulder about 2 feet from the wall.    Raise your arm to shoulder level and gently  walk  your fingers up the wall as high as you can.    Hold for a few seconds. Then walk your fingers back down.    Repeat 3 times. Move closer to the wall as you repeat.    Build up to holding each stretch for 30 seconds.  Caution: Do this stretch only if your  healthcare provider recommends it. Don t do it when you are first injured.            3269-2168 The [employer]. [address] [place] [address]. All rights reserved. This information is not intended as a substitute for professional medical care. Always follow your healthcare professional's instructions.    Shoulder Exercises: External Rotation  Strengthening exercises help make your injured shoulder more stable. To warm up, do flexibility (stretching) exercises first. Your healthcare provider will tell you what size hand weights to use for the strengthening exercise below. If you don t have hand weights, try using cans of soup instead:    Lie on your uninjured side with your head supported by a pillow or your arm. Place a small rolled-up towel under your top elbow.    Grasp a hand weight with your top hand and bend that arm to a right angle, resting your forearm against your stomach.    Keeping your elbow against the towel, slowly lift the weight until your forearm is slightly higher than your elbow. Return to the starting position. Repeat.    Work up to 5 to 15 lifts.    1365-8771 The [employer]. [address] [place] [address]. All rights reserved. This information is not intended as a substitute for professional medical care. Always follow your healthcare professional's instructions.        Shoulder Exercises: Internal Rotation    Strengthening exercises help make your injured shoulder more stable. To warm up, do flexibility (stretching) exercises first. Your healthcare provider will tell you what size hand weights to use for the strengthening exercise below. If you don t have hand weights, try using cans of soup instead:    With knees bent, lie on a firm surface. Using the hand on the same side as your injured shoulder, grasp a weight. Bend that arm to a right angle (90 degrees).    Rest your elbow on the floor.    Keeping your elbow next to your side, lower your forearm  toward the floor, away from your body. Do not lower your hand all the way to the floor.    Slowly return your forearm to your side. Repeat.    Work up to 5 to 15 lifts.     Note: Support your head and neck with a pillow.     5956-2790 [employer]. [address] [place] [address]. All rights reserved. This information is not intended as a substitute for professional medical care. Always follow your healthcare professional's instructions.        Shoulder Exercises: Side Raise    This exercise stretches and strengthens your shoulders. Before starting, read through all the instructions. During the exercise, breathe normally and use smooth movements. Stop if you feel any pain. If pain persists, call your healthcare provider.    Stand straight, holding a ____ pound weight in each hand, arms at sides, feet shoulder-width apart.    Slowly extend your arms up and out until weights are at shoulder level. Slowly return to starting position.    Repeat ____ times. Do ____ sets ____ times a day.     CAUTION: Don t swing the weights or raise weights above shoulder level.     3931-9716 [employer]. [address] [place] [address]. All rights reserved. This information is not intended as a substitute for professional medical care. Always follow your healthcare professional's instructions.          [employer] may offer reliable information regarding your diagnosis and treatment plan.    THANK YOU for coming in today. If you receive a survey via [employer] or mail please let us know if there was anything you especially appreciated today or if there is any way we can improve our clinic. We appreciate your input.    GENERAL INFORMATION:  Our hours are:  Monday :     Clinic 7:30 AM-430 PM ([facility])  Tuesday:      Operating Room All Day ([facility])  Wednesday: Clinic 7:30 AM - 11:15 AM ([facility])             Clinic 1:00  PM - 4:00PM ([facility])  Thursday:     Administrative Day  Friday:          Clinic 7:30 AM - 11:15 AM ([facility])            Clinic 1:00 PM - 4:00 PM ([facility])      [facility] for any issues or concerns: [phone]      We are not in the office Thursdays. Therefore non- urgent calls and medical messages received on Thursday will be addressed when we are back in the office on Wednesday. Urgent matters will be reviewed and addressed by one of our partners in the office as needed.    If lab work was done today as part of your evaluation you will generally be contacted via [email or web address], mail, or phone with the results within 1-5 days. If there is an alarming result we will contact you by phone. Lab results come back at varying times, I generally wait until all labs are resulted before making comments on results. Please note labs are automatically released to [email or web address] (if you have signed up for it) once available-at times you may see these prior to my having a chance to review them as well.    If you need refills please contact your pharmacist. They will send a refill request to me to review. Please allow 3 business days for us to process all refill requests. All narcotic refills should be handled in the clinic at the time of your visit.              Follow-ups after your visit        Follow-up notes from your care team     Return in about 6 weeks (around 8/8/2018), or if symptoms worsen or fail to improve, for Routine Visit.      Your next 10 appointments already scheduled     Jun 27, 2018  3:30 PM CDT   [facility]    [address] [place] [address] [id number] [phone]              Who to contact     If you have questions or need follow up information about today's clinic visit or your schedule please contact [facility] directly at  "[phone].  Normal or non-critical lab and imaging results will be communicated to you by MyChart, letter or phone within 4 business days after the clinic has received the results. If you do not hear from us within 7 days, please contact the clinic through MyChart or phone. If you have a critical or abnormal lab result, we will notify you by phone as soon as possible.  Submit refill requests through [employer] or call your pharmacy and they will forward the refill request to us. Please allow 3 business days for your refill to be completed.          Additional Information About Your Visit        Care EveryWhere ID     This is your Care EveryWhere ID. This could be used by other organizations to access your [facility] medical records  [id number]        Your Vitals Were     Height BMI (Body Mass Index)                1.791 m (5' 10.5\") 29 kg/m2           Blood Pressure from Last 3 Encounters:   06/27/18 108/77   06/22/18 104/70   05/18/18 124/78    Weight from Last 3 Encounters:   06/27/18 93 kg (205 lb)   06/22/18 93.2 kg (205 lb 6.4 oz)   04/03/18 96.2 kg (212 lb)              Today, you had the following     No orders found for display       Primary Care Provider Office Phone # Fax #    Yoni Kong [phone]       [address] [facility] [id number]        Equal Access to Services     LUCIANO [facility] Center[facility]UCY : Hadii bobby collier hadasho Somarcie, waaxda luqadaha, qaybta kaalmada richie, ray keller . So [facility] [phone].    ATENCIÓN: Si habla español, tiene a macedo disposición servicios gratuitos de asistencia lingüística. Valery al [phone].    We comply with applicable federal civil rights laws and [place] laws. We do not discriminate on the basis of race, color, national origin, age, disability, sex, sexual orientation, or gender identity.            Thank you!     Thank you for choosing [facility]  for your care. Our goal is always to provide " you with excellent care. Hearing back from our patients is one way we can continue to improve our services. Please take a few minutes to complete the written survey that you may receive in the mail after your visit with us. Thank you!             Your Updated Medication List - Protect others around you: Learn how to safely use, store and throw away your medicines at www.disposemymeds.org.          This list is accurate as of 6/27/18  3:20 PM.  Always use your most recent med list.                   Brand Name Dispense Instructions for use Diagnosis    atorvastatin 20 MG tablet    LIPITOR    90 tablet    Take 1 tablet (20 mg) by mouth daily    Hyperlipidemia LDL goal <130       celecoxib 200 MG capsule    celeBREX    30 capsule    1 Capsule daily    Polyneuropathy in other diseases classified elsewhere (H)       ciprofloxacin 500 MG tablet    CIPRO    14 tablet    Take 1 tablet (500 mg) by mouth 2 times daily    Abdominal pain, left lower quadrant       IBU-200 PO      Take 4 tablets by mouth 2 times daily        lidocaine 5 % Patch    LIDODERM    30 patch    Apply up to 3 patches to painful area at once for up to 12 h within a 24 h period.  Remove after 12 hours.    Episodic tension-type headache, not intractable       methocarbamol 500 MG tablet    ROBAXIN    60 tablet    Take 1 tablet (500 mg) by mouth 2 times daily    Polyneuropathy in other diseases classified elsewhere (H)       metroNIDAZOLE 500 MG tablet    FLAGYL    14 tablet    Take 1 tablet (500 mg) by mouth 2 times daily    Abdominal pain, left lower quadrant       ofloxacin 0.3 % otic solution    FLOXIN    10 mL    Place 5 drops into the right ear 2 times daily    Acute otitis media, unspecified otitis media type

## 2018-06-27 NOTE — PATIENT INSTRUCTIONS
Reliable shoe stores: To maximize your experience and provide the best possible fit.  Be sure to show them your foot concerns and tell them Dr. Ma sent you.      Stores listed in bold have only athletic shoes, and stores that are not bold are mostly casual or variety of shoes    [employer]  [address] [place] [address]  [phone]    [employer] [place]  [address] [place] [address]  [phone]     [employer] [place]  [address] [place] [address]  [phone]    [employer]  [address] [facility]  [place] [place] [address]  [phone]    [employer]  [address] [place], [place] [address] [phone]    [employer]  [address] [place] [address]  [phone]                         [employer] Locations:     [address] [place] [address]   [phone]     [address] [place] [address] [address] [place] [address]   [phone]     [address] [place] [address]   [phone]     [address] [facility] [place] [address]   [phone]     [address] [place] [address]   [phone]     [address] [place] [address]   [phone]     [address] [facility] [address]   [place] [address]   [phone]     [address] [facility]. [address]   [place] [address]   [phone]             How to find reasonable shoes          The correct width    Correct Fitting    Correct Length      Foot Distortion    Posture Distortion                          Torsional Rigidity      Grasp behind the heel and underneath the foot and twist      Bad    Excessive torsion/twist in midfoot     Less torsion/twist in midfoot is better                   Heel Counter Rigidity      Grasp just above   midsole and squeeze      Bad    Soft heel counter      Good    Rigid Heel Counter      Flexion Rigidity      Grasp shoe and bend from forefoot to rearfoot

## 2018-06-27 NOTE — MR AVS SNAPSHOT
After Visit Summary   6/27/2018    Shashi Raza    MRN: [id number]           Patient Information     Date Of Birth          1960        Visit Information        Provider Department      6/27/2018 3:30 PM Selvin Ma, KIMMY [facility]'s Diagnoses     Hallux valgus of left foot    -  1    Hallux limitus of left foot        Osteophyte, left foot          Care Instructions    Reliable shoe stores: To maximize your experience and provide the best possible fit.  Be sure to show them your foot concerns and tell them Dr. Ma sent you.      Stores listed in bold have only athletic shoes, and stores that are not bold are mostly casual or variety of shoes    [employer]  [address] [place] [address]  [phone]    [employer] [place]  [address] [place] [address]  [phone]    [employer]  [address] [place] [address]  [phone]    [employer]  [address] [facility] [address]  [phone]    [employer]  [address] [place] [address]  [phone]    [employer]  [address] [place] [address]  [phone]                         [employer] Locations:     [address] [place] [address]   [phone]     [address] [place] [address] [address] [place] [address]   [phone]     [address] [place] [address]   [phone]     [address] [place] [address]   [phone]     [address] [place] [address]   [phone]     [address] [facility] [place] [address]   [phone]     [address] [facility] [address]   [place] [address]   [phone]     [address]   [place], [place] [address] [phone]             How to find reasonable shoes          The correct width    Correct Fitting    Correct Length      Foot Distortion    Posture Distortion                          Torsional Rigidity    "   Grasp behind the heel and underneath the foot and twist      Bad    Excessive torsion/twist in midfoot     Less torsion/twist in midfoot is better                   Heel Counter Rigidity      Grasp just above   midsole and squeeze      Bad    Soft heel counter      Good    Rigid Heel Counter      Flexion Rigidity      Grasp shoe and bend from forefoot to rearfoot                        Follow-ups after your visit        Who to contact     If you have questions or need follow up information about today's clinic visit or your schedule please contact [facility] directly at [phone].  Normal or non-critical lab and imaging results will be communicated to you by MyChart, letter or phone within 4 business days after the clinic has received the results. If you do not hear from us within 7 days, please contact the clinic through MyChart or phone. If you have a critical or abnormal lab result, we will notify you by phone as soon as possible.  Submit refill requests through [employer] or call your pharmacy and they will forward the refill request to us. Please allow 3 business days for your refill to be completed.          Additional Information About Your Visit        Care EveryWhere ID     This is your Care EveryWhere ID. This could be used by other organizations to access your [facility] medical records  [id number]        Your Vitals Were     Height BMI (Body Mass Index)                5' 10.5\" (1.791 m) 29 kg/m2           Blood Pressure from Last 3 Encounters:   06/27/18 108/77   06/27/18 108/77   06/22/18 104/70    Weight from Last 3 Encounters:   06/27/18 205 lb (93 kg)   06/27/18 205 lb (93 kg)   06/22/18 205 lb 6.4 oz (93.2 kg)                 Today's Medication Changes          These changes are accurate as of 6/27/18  4:07 PM.  If you have any questions, ask your nurse or doctor.               Start taking these medicines.        Dose/Directions    meloxicam 15 MG tablet   Commonly known as:  " MOBIC   Used for:  Right rotator cuff tendinitis   Started by:  Taryn Bruce MD        Dose:  15 mg   Take 1 tablet (15 mg) by mouth daily   Quantity:  60 tablet   Refills:  1            Where to get your medicines      These medications were sent to [facility], [place] - [address]   [address] , [facility] [address]     Phone:  [phone]     meloxicam 15 MG tablet                Primary Care Provider Office Phone # Fax #    Yoni Kong [phone]       [address] [facility] [address]        Equal Access to Services     [facility]: Hadii aad ku hadasho Soomaali, waaxda luqadaha, qaybta kaalmada adeegyada, waxgabriella pisanoin hayaan adekathie keller . So [facility] [phone].    ATENCIÓN: Si habla español, tiene a macedo disposición servicios gratuitos de asistencia lingüística. [facility] [phone].    We comply with applicable federal civil rights laws and [place] laws. We do not discriminate on the basis of race, color, national origin, age, disability, sex, sexual orientation, or gender identity.            Thank you!     Thank you for choosing [facility]  for your care. Our goal is always to provide you with excellent care. Hearing back from our patients is one way we can continue to improve our services. Please take a few minutes to complete the written survey that you may receive in the mail after your visit with us. Thank you!             Your Updated Medication List - Protect others around you: Learn how to safely use, store and throw away your medicines at www.disposemymeds.org.          This list is accurate as of 6/27/18  4:07 PM.  Always use your most recent med list.                   Brand Name Dispense Instructions for use Diagnosis    atorvastatin 20 MG tablet    LIPITOR    90 tablet    Take 1 tablet (20 mg) by mouth daily    Hyperlipidemia LDL goal <130       celecoxib 200 MG capsule    celeBREX    30  capsule    1 Capsule daily    Polyneuropathy in other diseases classified elsewhere (H)       IBU-200 PO      Take 4 tablets by mouth 2 times daily        lidocaine 5 % Patch    LIDODERM    30 patch    Apply up to 3 patches to painful area at once for up to 12 h within a 24 h period.  Remove after 12 hours.    Episodic tension-type headache, not intractable       meloxicam 15 MG tablet    MOBIC    60 tablet    Take 1 tablet (15 mg) by mouth daily    Right rotator cuff tendinitis       methocarbamol 500 MG tablet    ROBAXIN    60 tablet    Take 1 tablet (500 mg) by mouth 2 times daily    Polyneuropathy in other diseases classified elsewhere (H)       ofloxacin 0.3 % otic solution    FLOXIN    10 mL    Place 5 drops into the right ear 2 times daily    Acute otitis media, unspecified otitis media type

## 2018-06-27 NOTE — PATIENT INSTRUCTIONS
Encounter Diagnosis   Name Primary?     Right rotator cuff tendinitis Yes     Rest, ice and elevate above heart level as needed for pain control  1. Xrays: Your x-rays look excellent today, no fracture no dislocation no tumor. We just see some arthritis of your AC joint but you are not having pain there.  2.  We can tell on examination that you have good strength and we do not suspect that your rotator cuff is torn.  3.  However we can also tell on examination and from your history that your rotator cuff tendons are irritated.  4. We decided to do a cortisone injection today.  This is like putting very powerful ibuprofen right to the area it is needed.  We have information about the injection below.   5. We can do 4 cortisone injections in a years time but no more than that. We can do the first 3 injections within 6 weeks of each other but the fourth injection we usually wait 2 months. This helps protect the cartilage in your joint.  6. Mobic: I ordered Mobic 15mg once a day times 2 weeks, then take as needed.  Stop this medication if you have any abdominal pain with it and do not take NSAIDs like Ibuprofen or Aleve while on this medication.  I sent this to your pharmacy.      7.  We have exercises below that you can work on.  Start once her shoulder feels better and start with the stretches and then progressed to the strengthening as her shoulder tolerates it.  We talked about formal physical therapy, but decided to hold for now.     8. Listen to your body and let the pain guide your activity, as in slow down if you are having a lot of pain, but increase activity gradually as the pain decreases.   9. You can followup with Taryn Bruce MD in 6 weeks, if you are having pain at that time we can do a cortisone injection.  You can always cancel the appointment if you are doing really well and follow-up as needed.   Cortisone Instructions:     1. You received an injection of cortisone into your right shoulder  today.  2. The joint(s) may be more painful for the first 1-2 days.  3. We ask you to continue to rest the joint(s) for a few more days before resuming regular activities.  4. Pain Medications you can take (as long as your primary care provider allows these meds and you do not have kidney or liver conditions):  Tylenol  Take 1000 mg by mouth every 6 hours as needed; maximum dose 4000 mg a day  Ibuprofen  600 mg every 6 hours as needed; maximum 2400 mg a day  (OK to take tylenol and ibuprofen at the same time)  5. Rest, ice and elevate as needed for pain control  6. Watch for these signs of infection: redness, swelling, drainage, warmth to touch, increased pain, or fever. Call the clinic or make an appointment to be seen if you think you have an infection.  7. If you are diabetic, make sure you keep a close eye on your blood sugars, they can get elevated with cortisone injections.   8. Sometimes it can take 1-2 weeks for it to reach its full effect.    Cortisone Injections  Cortisone is a type of steroid. It can greatly reduce swelling, redness, and irritation (inflammation) and pain. Being injected with cortisone is simple and doesn t take long. Your doctor may ask you questions about your health. Certain health conditions, such as diabetes, can be affected by cortisone.     Your pain may be relieved by a cortisone injection.   Why have a cortisone injection?  Injecting cortisone can relieve pain for anything from a sports injury to arthritis. Your doctor may suggest an injection if rest, splints, or oral medicine doesn t relieve your pain. Injecting cortisone is simpler than having surgery. And cortisone may provide the lasting pain relief that can help you get out and enjoy life again.  Getting the injection  Your doctor will start by cleaning and occasionally numbing your skin at the injection site. Next you ll be injected with local anesthetics (for short-term pain relief) and cortisone. The injection may last a  few moments. A small bandage will be put over the injection site. You ll then be ready to go home.  After your injection  After being injected, make sure you don t injure the treated region. But stay active. Enjoy a walk or some other mild activity. Just be careful not to strain the region that gave you trouble.  The next day  Some people feel more pain after being injected. This is normal, and it will go away soon. Applying ice for 20 minutes at a time to your injury may reduce the increased pain. Rest for the first day or two. You don t need to stay in bed. But avoid tasks that may strain the injured region.  If you have diabetes  Cortisone injections can cause blood sugar to be increased for several days after the injection. If you have diabetes, you should follow your blood  sugar closely during this time. Follow your regular plan for what to do when your blood sugar is elevated.     8298-5158 The [employer]. [address] [place] [address]. All rights reserved. This information is not intended as a substitute for professional medical care. Always follow your healthcare professional's instructions.     Understanding Rotator Cuff Injuries  The rotator cuff is a team of muscles and connecting tendons in the shoulder. It attaches your upper arm to your shoulder blade. Your rotator cuff helps you reach, throw, push, pull, and lift. Without it, your shoulder can't do its job properly.        Overuse tendonitis is irritation, bruising, or fraying of the rotator cuff.        A healthy rotator cuff  A healthy rotator cuff gives your shoulder flexibility and control. The rotator cuff holds your upper arm bone (humerus) in your shoulder socket (glenoid). It also helps move the shoulder.  A damaged rotator cuff  Pain and weakness told you that something was wrong with your shoulder. Now you know it s a rotator cuff problem. Rotator cuff tendons can become damaged or inflamed. This is called tendonitis.  Possible causes include:    Irritation from overuse    Bursal inflammation (bursitis)    Pinching (impingement)    Calcium deposits (calcification)    Tears in the tendon.  Getting your shoulder healthy again  Care for your shoulder will most likely begin with nonsurgical treatments. You may start with simple rest. If needed, you may have injections that decrease inflammation and pain. Your healthcare provider will tell you how often you may need these treatments. If rest and injections relieve your pain, you will be given an exercise program. This will help restore your shoulder s strength and function. If your pain continues, your healthcare provider may suggest surgery to repair the rotator cuff.    5342-5305 The [employer]. [address] [place] [address]. All rights reserved. This information is not intended as a substitute for professional medical care. Always follow your healthcare professional's instructions.      Tendonitis  A tendon is the thick fibrous cord that joins muscle to bone and allows joints to move. When a tendon becomes inflamed, it is called tendonitis. This can occur from overuse, injury, or infection. This usually involves the shoulders, forearm, wrist, hands and foot. Symptoms include pain, swelling and tenderness to the touch. Moving the joint increases the pain.  It takes 4 to 6 weeks for tendonitis to heal. It is treated by preventing motion of the tendon with a splint or brace and the use of anti-inflammatory medicine.  Home care    Some people find relief with ice packs. These can be crushed or cubed ice in a plastic bag or a bag of frozen vegetables wrapped in a thin towel. Other people get better relief with heat. This can include a hot shower, hot bath, or a moist towel warmed in a microwave. Try each and use the method that feels best, for 15 to 20 minutes several times a day.    Rest the inflamed joint and protect it from movement.    You may use  over-the-counter ibuprofen or naproxen to treat pain and inflammation, unless another medicine was prescribed. If you can't take these medicines, acetaminophen may help with the pain, but does not treat inflammation. If you have chronic liver or kidney disease or ever had a stomach ulcer or gastrointestinal bleeding, talk with your doctor before using these medicines.    As your symptoms improve, begin gradual motion at the involved joint.  Follow-up care  Follow up with your healthcare provider if you are not improving after 5 days of treatment.  When to seek medical advice  Call your healthcare provider right away if any of these occur:    Redness over the painful area    Increasing pain or swelling at the joint    Fever (1 degree above your normal temperature) lasting 24 to 48 hours Or, whatever your healthcare provider told you to report based on your condition    4529-9074 [employer]. [address] [place] [address], [place] [id number]. All rights reserved. This information is not intended as a substitute for professional medical care. Always follow your healthcare professional's instructions.      Exercises for Shoulder Flexibility: External Rotation  This stretch can help restore shoulder flexibility and relieve pain over time. When stretching, be sure to breathe deeply. Follow any special instructions from your doctor or physical therapist:     a doorway. Grasp the doorjamb with the hand on the frozen side. Your arm should be bent.    With the other hand, hold the elbow on the frozen side firmly against your body.    Standing in the same spot, rotate your body away from the doorjamb. Stop when you feel the stretch in the shoulder. At first, try to hold the stretch for 5 seconds.    Work up to doing 3 sets of this stretch, 3 times a day. Work up to holding the stretch for 30 to 60 seconds.  Note: Keep your arms as still as you can. Over time, rotate your body a little more to enhance the stretch.  But be careful not to twist your back.        Exercises for Shoulder Flexibility: Internal Rotation    This stretch can help restore shoulder flexibility and relieve pain over time. When stretching, be sure to breathe deeply. Follow any special instructions from your healthcare provider or physical therapist.    While seated, move the arm on the side you want to stretch toward the middle of your back. The palm of your hand should face out.    Cup your other hand under the hand that s behind your back. Gently push your cupped hand upward until you feel the stretch in the shoulder. Try to hold the stretch for 5 seconds.    Work up to doing 3 sets of this stretch, 3 times a day. Work up to holding the stretch for 30 to 60 seconds.  Note: Keep your back straight. It s OK if your hand can t reach the middle of your back. Instead, start the stretch with your hand as close as you can get it to the middle of your back.      Exercises for Shoulder Flexibility: Wall Walk  Improving your flexibility can reduce pain. Stretching exercises also can help increase your range of pain-free motion. Breathe normally when you exercise. Use smooth, fluid movements.  Note: Follow any special instructions you are given. If you feel pain, stop the exercise. If the pain continues after stopping, call your healthcare provider:    Stand with your shoulder about 2 feet from the wall.    Raise your arm to shoulder level and gently  walk  your fingers up the wall as high as you can.    Hold for a few seconds. Then walk your fingers back down.    Repeat 3 times. Move closer to the wall as you repeat.    Build up to holding each stretch for 30 seconds.  Caution: Do this stretch only if your healthcare provider recommends it. Don t do it when you are first injured.            4510-1862 The [employer]. [address] [place] [address], [place] [address]. All rights reserved. This information is not intended as a substitute for professional medical  care. Always follow your healthcare professional's instructions.    Shoulder Exercises: External Rotation  Strengthening exercises help make your injured shoulder more stable. To warm up, do flexibility (stretching) exercises first. Your healthcare provider will tell you what size hand weights to use for the strengthening exercise below. If you don t have hand weights, try using cans of soup instead:    Lie on your uninjured side with your head supported by a pillow or your arm. Place a small rolled-up towel under your top elbow.    Grasp a hand weight with your top hand and bend that arm to a right angle, resting your forearm against your stomach.    Keeping your elbow against the towel, slowly lift the weight until your forearm is slightly higher than your elbow. Return to the starting position. Repeat.    Work up to 5 to 15 lifts.    8684-8545 The [employer]. [address] [place], [place] [id number]. All rights reserved. This information is not intended as a substitute for professional medical care. Always follow your healthcare professional's instructions.        Shoulder Exercises: Internal Rotation    Strengthening exercises help make your injured shoulder more stable. To warm up, do flexibility (stretching) exercises first. Your healthcare provider will tell you what size hand weights to use for the strengthening exercise below. If you don t have hand weights, try using cans of soup instead:    With knees bent, lie on a firm surface. Using the hand on the same side as your injured shoulder, grasp a weight. Bend that arm to a right angle (90 degrees).    Rest your elbow on the floor.    Keeping your elbow next to your side, lower your forearm toward the floor, away from your body. Do not lower your hand all the way to the floor.    Slowly return your forearm to your side. Repeat.    Work up to 5 to 15 lifts.     Note: Support your head and neck with a pillow.     5550-1973 [employer]  [employer]. [address] [place] [address]. All rights reserved. This information is not intended as a substitute for professional medical care. Always follow your healthcare professional's instructions.        Shoulder Exercises: Side Raise    This exercise stretches and strengthens your shoulders. Before starting, read through all the instructions. During the exercise, breathe normally and use smooth movements. Stop if you feel any pain. If pain persists, call your healthcare provider.    Stand straight, holding a ____ pound weight in each hand, arms at sides, feet shoulder-width apart.    Slowly extend your arms up and out until weights are at shoulder level. Slowly return to starting position.    Repeat ____ times. Do ____ sets ____ times a day.     CAUTION: Don t swing the weights or raise weights above shoulder level.     0872-3468 [employer] [facility]. [address] [place] [address]. All rights reserved. This information is not intended as a substitute for professional medical care. Always follow your healthcare professional's instructions.          [employer] and [employer] may offer reliable information regarding your diagnosis and treatment plan.    THANK YOU for coming in today. If you receive a survey via [employer] or mail please let us know if there was anything you especially appreciated today or if there is any way we can improve our clinic. We appreciate your input.    GENERAL INFORMATION:  Our hours are:  Monday :     Clinic 7:30 AM-430 PM ([facility])  Tuesday:      Operating Room All Day ([facility])  Wednesday: Clinic 7:30 AM - 11:15 AM ([facility])             Clinic 1:00 PM - 4:00PM ([facility])  Thursday:     Administrative Day  Friday:          Clinic 7:30 AM - 11:15 AM ([facility])            Clinic 1:00 PM - 4:00 PM ([facility])      [facility] for any  issues or concerns: [phone]      We are not in the office Thursdays. Therefore non- urgent calls and medical messages received on Thursday will be addressed when we are back in the office on Wednesday. Urgent matters will be reviewed and addressed by one of our partners in the office as needed.    If lab work was done today as part of your evaluation you will generally be contacted via [employer], mail, or phone with the results within 1-5 days. If there is an alarming result we will contact you by phone. Lab results come back at varying times, I generally wait until all labs are resulted before making comments on results. Please note labs are automatically released to [employer] (if you have signed up for it) once available-at times you may see these prior to my having a chance to review them as well.    If you need refills please contact your pharmacist. They will send a refill request to me to review. Please allow 3 business days for us to process all refill requests. All narcotic refills should be handled in the clinic at the time of your visit.
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
## 2018-10-01 DIAGNOSIS — G63 POLYNEUROPATHY IN OTHER DISEASES CLASSIFIED ELSEWHERE (H): ICD-10-CM

## 2018-10-01 DIAGNOSIS — M75.81 RIGHT ROTATOR CUFF TENDINITIS: ICD-10-CM

## 2018-10-01 RX ORDER — METHOCARBAMOL 500 MG/1
TABLET, FILM COATED ORAL
Qty: 60 TABLET | Refills: 1 | Status: SHIPPED | OUTPATIENT
Start: 2018-10-01 | End: 2019-02-22

## 2018-10-01 RX ORDER — MELOXICAM 15 MG/1
15 TABLET ORAL DAILY
Qty: 60 TABLET | Refills: 1 | Status: SHIPPED | OUTPATIENT
Start: 2018-10-01 | End: 2019-02-22

## 2018-10-01 NOTE — TELEPHONE ENCOUNTER
I reviewed this patient's chart; prescription appropriate and is approved.  The patient's GFR is 73.  Carter Marcum PA-C

## 2018-10-01 NOTE — TELEPHONE ENCOUNTER
Methocarbamol 500 MG       Last Written Prescription Date:  2/21/18  Last Fill Quantity: 60,   # refills: 1  Last Office Visit: 6-22-18  Future Office visit:       Routing refill request to provider for review/approval because:  Drug not on the [facility], P or [facility] refill protocol or controlled substance

## 2018-10-31 ENCOUNTER — OFFICE VISIT (OUTPATIENT)
Dept: FAMILY MEDICINE | Facility: CLINIC | Age: 58
End: 2018-10-31
Payer: COMMERCIAL

## 2018-10-31 ENCOUNTER — HOSPITAL ENCOUNTER (OUTPATIENT)
Dept: GENERAL RADIOLOGY | Facility: CLINIC | Age: 58
Discharge: HOME | End: 2018-10-31
Attending: FAMILY MEDICINE | Admitting: FAMILY MEDICINE
Payer: COMMERCIAL

## 2018-10-31 VITALS
OXYGEN SATURATION: 96 % | SYSTOLIC BLOOD PRESSURE: 114 MMHG | BODY MASS INDEX: 28.73 KG/M2 | DIASTOLIC BLOOD PRESSURE: 68 MMHG | TEMPERATURE: 97 F | WEIGHT: 203.1 LBS | RESPIRATION RATE: 14 BRPM | HEART RATE: 85 BPM

## 2018-10-31 DIAGNOSIS — Z12.5 SCREENING FOR PROSTATE CANCER: ICD-10-CM

## 2018-10-31 DIAGNOSIS — M25.522 LEFT ELBOW PAIN: ICD-10-CM

## 2018-10-31 DIAGNOSIS — Z23 NEED FOR PROPHYLACTIC VACCINATION AND INOCULATION AGAINST INFLUENZA: ICD-10-CM

## 2018-10-31 DIAGNOSIS — M25.522 LEFT ELBOW PAIN: Primary | ICD-10-CM

## 2018-10-31 DIAGNOSIS — E78.5 HYPERLIPIDEMIA LDL GOAL <130: ICD-10-CM

## 2018-10-31 PROCEDURE — 90682 RIV4 VACC RECOMBINANT DNA IM: CPT | Performed by: FAMILY MEDICINE

## 2018-10-31 PROCEDURE — 73080 X-RAY EXAM OF ELBOW: CPT | Mod: TC

## 2018-10-31 PROCEDURE — 99213 OFFICE O/P EST LOW 20 MIN: CPT | Mod: 25 | Performed by: FAMILY MEDICINE

## 2018-10-31 PROCEDURE — 90471 IMMUNIZATION ADMIN: CPT | Performed by: FAMILY MEDICINE

## 2018-10-31 ASSESSMENT — PAIN SCALES - GENERAL: PAINLEVEL: MILD PAIN (3)

## 2018-10-31 NOTE — MR AVS SNAPSHOT
After Visit Summary   10/31/2018    Shashi Raza    MRN: [id number]           Patient Information     Date Of Birth          1960        Visit Information        Provider Department      10/31/2018 8:20 AM Yoni Kong MD [facility]        Today's Diagnoses     Left elbow pain    -  1    Need for prophylactic vaccination and inoculation against influenza        Hyperlipidemia LDL goal <130        Screening for prostate cancer           Follow-ups after your visit        Follow-up notes from your care team     Return if symptoms worsen or fail to improve, for Lab Work.      Your next 10 appointments already scheduled     Nov 19, 2018  8:45 AM CST   New Visit with Akash Austin MD, Eusebia Pinto   [facility]    [address] [place] [id number] [phone]              Who to contact     If you have questions or need follow up information about today's clinic visit or your schedule please contact [facility] directly at [phone].  Normal or non-critical lab and imaging results will be communicated to you by MyChart, letter or phone within 4 business days after the clinic has received the results. If you do not hear from us within 7 days, please contact the clinic through MyChart or phone. If you have a critical or abnormal lab result, we will notify you by phone as soon as possible.  Submit refill requests through [facility] or call your pharmacy and they will forward the refill request to us. Please allow 3 business days for your refill to be completed.          Additional Information About Your Visit        Care EveryWhere ID     This is your Care EveryWhere ID. This could be used by other organizations to access your [facility] medical records  [id number]        Your Vitals Were     Pulse Temperature Respirations Pulse Oximetry BMI (Body Mass Index)       85 97  F (36.1  C)  (Temporal) 14 96% 28.73 kg/m2        Blood Pressure from Last 3 Encounters:   10/31/18 114/68   06/27/18 108/77   06/27/18 108/77    Weight from Last 3 Encounters:   10/31/18 203 lb 1.6 oz (92.1 kg)   06/27/18 205 lb (93 kg)   06/27/18 205 lb (93 kg)              We Performed the Following     FLU VACCINE, (RIV4) RECOMBINANT HA  , IM (FluBlok, egg free) [73129]- >18 YRS ([facility] recommended  50-64 YRS)     Vaccine Administration, Initial [93429]        Primary Care Provider Office Phone # Fax #    Yoni Kong [phone]       [address] [facility] [id number]        Equal Access to Services     ANN TRAN : Linh mitchell Somarcie, waaxda luqadaha, qaybta kaalmada mehrdadyahoracio, ray keller . [facility] [phone].    ATENCIÓN: Si habla español, tiene a maecdo disposición servicios gratuitos de asistencia lingüística. Llame al [phone].    We comply with applicable federal civil rights laws and [place] laws. We do not discriminate on the basis of race, color, national origin, age, disability, sex, sexual orientation, or gender identity.            Thank you!     Thank you for choosing [facility]  for your care. Our goal is always to provide you with excellent care. Hearing back from our patients is one way we can continue to improve our services. Please take a few minutes to complete the written survey that you may receive in the mail after your visit with us. Thank you!             Your Updated Medication List - Protect others around you: Learn how to safely use, store and throw away your medicines at www.disposemymeds.org.          This list is accurate as of 10/31/18 11:59 PM.  Always use your most recent med list.                   Brand Name Dispense Instructions for use Diagnosis    atorvastatin 20 MG tablet    LIPITOR    90 tablet    Take 1 tablet (20 mg) by mouth daily    Hyperlipidemia LDL goal <130       celecoxib 200 MG  capsule    celeBREX    30 capsule    1 Capsule daily    Polyneuropathy in other diseases classified elsewhere (H)       lidocaine 5 % Patch    LIDODERM    30 patch    Apply up to 3 patches to painful area at once for up to 12 h within a 24 h period.  Remove after 12 hours.    Episodic tension-type headache, not intractable       meloxicam 15 MG tablet    MOBIC    60 tablet    Take 1 tablet (15 mg) by mouth daily    Right rotator cuff tendinitis       methocarbamol 500 MG tablet    ROBAXIN    60 tablet    TAKE ONE TABLET BY MOUTH TWICE A DAY    Polyneuropathy in other diseases classified elsewhere (H)       ofloxacin 0.3 % otic solution    FLOXIN    10 mL    Place 5 drops into the right ear 2 times daily    Acute otitis media, unspecified otitis media type
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
## 2018-11-19 ENCOUNTER — OFFICE VISIT (OUTPATIENT)
Dept: OTOLARYNGOLOGY | Facility: CLINIC | Age: 58
End: 2018-11-19
Payer: COMMERCIAL

## 2018-11-19 VITALS — WEIGHT: 203 LBS | HEART RATE: 72 BPM | BODY MASS INDEX: 28.72 KG/M2 | OXYGEN SATURATION: 98 %

## 2018-11-19 DIAGNOSIS — H92.01 OTALGIA, RIGHT EAR: ICD-10-CM

## 2018-11-19 DIAGNOSIS — H95.191 ENCOUNTER FOR MASTOIDECTOMY CAVITY DEBRIDEMENT, RIGHT: Primary | ICD-10-CM

## 2018-11-19 PROCEDURE — 99203 OFFICE O/P NEW LOW 30 MIN: CPT | Mod: 25 | Performed by: OTOLARYNGOLOGY

## 2018-11-19 PROCEDURE — 69220 CLEAN OUT MASTOID CAVITY: CPT | Performed by: OTOLARYNGOLOGY

## 2018-11-19 NOTE — MR AVS SNAPSHOT
After Visit Summary   11/19/2018    Shashi Raza    MRN: [id number]           Patient Information     Date Of Birth          1960        Visit Information        Provider Department      11/19/2018 9:15 AM Akash Austin MD [facility]        Today's Diagnoses     Mixed hearing loss    -  1    Encounter for mastoidectomy cavity debridement, right           Follow-ups after your visit        Additional Services     AUDIOLOGY ADULT REFERRAL       Your provider has referred you to: [facility]: [facility] [phone]   [email or web address]    Specialty Testing:  Audiogram w/Tymps and Reflexes (Comprehensive Audiology Evaluation)                  Your next 10 appointments already scheduled     Dec 20, 2018  9:00 AM CST   Return Visit with Eusebia Pinto   [facility] ([address] [address] [phone]            Dec 20, 2018  9:30 AM CST   Return Visit with Akash Austin MD   [facility] ([address] [address] [phone]              Who to contact     If you have questions or need follow up information about today's clinic visit or your schedule please contact [facility] directly at [phone].  Normal or non-critical lab and imaging results will be communicated to you by MyChart, letter or phone within 4 business days after the clinic has received the results. If you do not hear from us within 7 days, please contact the clinic through MyChart or phone. If you have a critical or abnormal lab result, we will notify you by phone as soon as possible.  Submit refill requests through [employer] or call your pharmacy and they will forward the refill request to us. Please allow 3 business days for your refill to be completed.          Additional Information About  Your Visit        Care EveryWhere ID     This is your Care EveryWhere ID. This could be used by other organizations to access your [facility] medical records  [id number]        Your Vitals Were     Pulse Pulse Oximetry BMI (Body Mass Index)             72 98% 28.72 kg/m2          Blood Pressure from Last 3 Encounters:   10/31/18 114/68   06/27/18 108/77   06/27/18 108/77    Weight from Last 3 Encounters:   11/19/18 92.1 kg (203 lb)   10/31/18 92.1 kg (203 lb 1.6 oz)   06/27/18 93 kg (205 lb)              We Performed the Following     AUDIOLOGY ADULT REFERRAL        Primary Care Provider Office Phone # Fax #    Yoni Kong [phone]       [address] [facility] [address]        Equal Access to Services     ANN TRAN : Hadii bobby collier hadasho Soomaali, waaxda luqadaha, qaybta kaalmada adeegyada, ray keller . So [facility] [phone].    ATENCIÓN: Si habla español, tiene a macedo disposición servicios gratuitos de asistencia lingüística. Valery al [phone].    We comply with applicable federal civil rights laws and [place] laws. We do not discriminate on the basis of race, color, national origin, age, disability, sex, sexual orientation, or gender identity.            Thank you!     Thank you for choosing [facility]  for your care. Our goal is always to provide you with excellent care. Hearing back from our patients is one way we can continue to improve our services. Please take a few minutes to complete the written survey that you may receive in the mail after your visit with us. Thank you!             Your Updated Medication List - Protect others around you: Learn how to safely use, store and throw away your medicines at www.disposemymeds.org.          This list is accurate as of 11/19/18 10:24 AM.  Always use your most recent med list.                   Brand Name Dispense Instructions for use Diagnosis    atorvastatin 20 MG tablet     LIPITOR    90 tablet    Take 1 tablet (20 mg) by mouth daily    Hyperlipidemia LDL goal <130       celecoxib 200 MG capsule    celeBREX    30 capsule    1 Capsule daily    Polyneuropathy in other diseases classified elsewhere (H)       lidocaine 5 % Patch    LIDODERM    30 patch    Apply up to 3 patches to painful area at once for up to 12 h within a 24 h period.  Remove after 12 hours.    Episodic tension-type headache, not intractable       meloxicam 15 MG tablet    MOBIC    60 tablet    Take 1 tablet (15 mg) by mouth daily    Right rotator cuff tendinitis       methocarbamol 500 MG tablet    ROBAXIN    60 tablet    TAKE ONE TABLET BY MOUTH TWICE A DAY    Polyneuropathy in other diseases classified elsewhere (H)       ofloxacin 0.3 % otic solution    FLOXIN    10 mL    Place 5 drops into the right ear 2 times daily    Acute otitis media, unspecified otitis media type
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
## 2018-12-20 ENCOUNTER — OFFICE VISIT (OUTPATIENT)
Dept: OTOLARYNGOLOGY | Facility: CLINIC | Age: 58
End: 2018-12-20
Payer: COMMERCIAL

## 2018-12-20 VITALS — HEART RATE: 75 BPM | OXYGEN SATURATION: 98 % | WEIGHT: 210 LBS | BODY MASS INDEX: 29.71 KG/M2

## 2018-12-20 DIAGNOSIS — H70.10 CHRONIC INFLAMMATION OF MASTOID CAVITY: ICD-10-CM

## 2018-12-20 DIAGNOSIS — H90.3 SENSORINEURAL HEARING LOSS (SNHL) OF BOTH EARS: Primary | ICD-10-CM

## 2018-12-20 PROCEDURE — 99213 OFFICE O/P EST LOW 20 MIN: CPT | Performed by: OTOLARYNGOLOGY
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
## 2019-02-15 DIAGNOSIS — Z12.5 SCREENING FOR PROSTATE CANCER: ICD-10-CM

## 2019-02-15 DIAGNOSIS — E78.5 HYPERLIPIDEMIA LDL GOAL <130: ICD-10-CM

## 2019-02-15 LAB
ALBUMIN SERPL-MCNC: 3.9 G/DL (ref 3.4–5)
ALP SERPL-CCNC: 80 U/L (ref 40–150)
ALT SERPL W P-5'-P-CCNC: 35 U/L (ref 0–70)
ANION GAP SERPL CALCULATED.3IONS-SCNC: 5 MMOL/L (ref 3–14)
AST SERPL W P-5'-P-CCNC: 23 U/L (ref 0–45)
BILIRUB SERPL-MCNC: 0.5 MG/DL (ref 0.2–1.3)
BUN SERPL-MCNC: 11 MG/DL (ref 7–30)
CALCIUM SERPL-MCNC: 8.8 MG/DL (ref 8.5–10.1)
CHLORIDE SERPL-SCNC: 108 MMOL/L (ref 94–109)
CHOLEST SERPL-MCNC: 119 MG/DL
CO2 SERPL-SCNC: 29 MMOL/L (ref 20–32)
CREAT SERPL-MCNC: 1.06 MG/DL (ref 0.66–1.25)
GFR SERPL CREATININE-BSD FRML MDRD: 76 ML/MIN/{1.73_M2}
GLUCOSE SERPL-MCNC: 108 MG/DL (ref 70–99)
HDLC SERPL-MCNC: 41 MG/DL
LDLC SERPL CALC-MCNC: 54 MG/DL
NONHDLC SERPL-MCNC: 78 MG/DL
POTASSIUM SERPL-SCNC: 4.1 MMOL/L (ref 3.4–5.3)
PROT SERPL-MCNC: 7.3 G/DL (ref 6.8–8.8)
PSA SERPL-ACNC: 1.68 UG/L (ref 0–4)
SODIUM SERPL-SCNC: 142 MMOL/L (ref 133–144)
TRIGL SERPL-MCNC: 120 MG/DL

## 2019-02-15 PROCEDURE — 80053 COMPREHEN METABOLIC PANEL: CPT | Performed by: FAMILY MEDICINE

## 2019-02-15 PROCEDURE — G0103 PSA SCREENING: HCPCS | Performed by: FAMILY MEDICINE

## 2019-02-15 PROCEDURE — 36415 COLL VENOUS BLD VENIPUNCTURE: CPT | Performed by: FAMILY MEDICINE

## 2019-02-15 PROCEDURE — 80061 LIPID PANEL: CPT | Performed by: FAMILY MEDICINE

## 2019-02-15 NOTE — LETTER
February 20, 2019      Shashi Raza  [address] [facility] [address]        Dear ,    We are writing to inform you of your test results.    Labs from last week show PSA normal at 1.68 chemistry panel normal your blood sugar was borderline again at 108 cholesterol is good at 119 triglycerides were excellent at 120     Resulted Orders   Prostate spec antigen screen   Result Value Ref Range    PSA 1.68 0 - 4 ug/L      Comment:      Assay Method:  Chemiluminescence using Siemens Vista analyzer   **Comprehensive metabolic panel FUTURE 6mo   Result Value Ref Range    Sodium 142 133 - 144 mmol/L    Potassium 4.1 3.4 - 5.3 mmol/L    Chloride 108 94 - 109 mmol/L    Carbon Dioxide 29 20 - 32 mmol/L    Anion Gap 5 3 - 14 mmol/L    Glucose 108 (H) 70 - 99 mg/dL    Urea Nitrogen 11 7 - 30 mg/dL    Creatinine 1.06 0.66 - 1.25 mg/dL    GFR Estimate 76 >60 mL/min/[1.73_m2]      Comment:      Non  GFR Calc  Starting 12/18/2018, serum creatinine based estimated GFR (eGFR) will be   calculated using the Chronic Kidney Disease Epidemiology Collaboration   (CKD-EPI) equation.      GFR Estimate If Black 88 >60 mL/min/[1.73_m2]      Comment:       GFR Calc  Starting 12/18/2018, serum creatinine based estimated GFR (eGFR) will be   calculated using the Chronic Kidney Disease Epidemiology Collaboration   (CKD-EPI) equation.      Calcium 8.8 8.5 - 10.1 mg/dL    Bilirubin Total 0.5 0.2 - 1.3 mg/dL    Albumin 3.9 3.4 - 5.0 g/dL    Protein Total 7.3 6.8 - 8.8 g/dL    Alkaline Phosphatase 80 40 - 150 U/L    ALT 35 0 - 70 U/L    AST 23 0 - 45 U/L   Lipid panel reflex to direct LDL Fasting   Result Value Ref Range    Cholesterol 119 <200 mg/dL    Triglycerides 120 <150 mg/dL    HDL Cholesterol 41 >39 mg/dL    LDL Cholesterol Calculated 54 <100 mg/dL      Comment:      Desirable:       <100 mg/dl    Non HDL Cholesterol 78 <130 mg/dL       If you have any questions or concerns, please call the  clinic at the number listed above.       Sincerely,        Dr. Yoni Kong

## 2019-02-20 NOTE — RESULT ENCOUNTER NOTE
Labs from last week show PSA normal at 1.68 chemistry panel normal your blood sugar was borderline again at 108 cholesterol is good at 119 triglycerides were excellent at 120

## 2019-02-22 ENCOUNTER — OFFICE VISIT (OUTPATIENT)
Dept: FAMILY MEDICINE | Facility: CLINIC | Age: 59
End: 2019-02-22
Payer: COMMERCIAL

## 2019-02-22 VITALS
SYSTOLIC BLOOD PRESSURE: 120 MMHG | RESPIRATION RATE: 14 BRPM | DIASTOLIC BLOOD PRESSURE: 62 MMHG | OXYGEN SATURATION: 97 % | WEIGHT: 203 LBS | HEART RATE: 83 BPM | BODY MASS INDEX: 28.42 KG/M2 | HEIGHT: 71 IN | TEMPERATURE: 97.8 F

## 2019-02-22 DIAGNOSIS — M75.81 RIGHT ROTATOR CUFF TENDINITIS: ICD-10-CM

## 2019-02-22 DIAGNOSIS — Z00.00 ROUTINE GENERAL MEDICAL EXAMINATION AT A HEALTH CARE FACILITY: Primary | ICD-10-CM

## 2019-02-22 DIAGNOSIS — G63 POLYNEUROPATHY IN OTHER DISEASES CLASSIFIED ELSEWHERE (H): ICD-10-CM

## 2019-02-22 DIAGNOSIS — E78.5 HYPERLIPIDEMIA LDL GOAL <130: ICD-10-CM

## 2019-02-22 PROCEDURE — 99396 PREV VISIT EST AGE 40-64: CPT | Performed by: FAMILY MEDICINE

## 2019-02-22 RX ORDER — METHOCARBAMOL 500 MG/1
500 TABLET, FILM COATED ORAL 2 TIMES DAILY
Qty: 60 TABLET | Refills: 1 | Status: SHIPPED | OUTPATIENT
Start: 2019-02-22 | End: 2019-10-14

## 2019-02-22 RX ORDER — ATORVASTATIN CALCIUM 20 MG/1
20 TABLET, FILM COATED ORAL DAILY
Qty: 90 TABLET | Refills: 3 | Status: SHIPPED | OUTPATIENT
Start: 2019-02-22 | End: 2020-03-02

## 2019-02-22 RX ORDER — MELOXICAM 15 MG/1
15 TABLET ORAL DAILY
Qty: 60 TABLET | Refills: 1 | Status: SHIPPED | OUTPATIENT
Start: 2019-02-22 | End: 2020-03-02

## 2019-02-22 ASSESSMENT — MIFFLIN-ST. JEOR: SCORE: 1750.8
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
## 2019-05-20 ENCOUNTER — OFFICE VISIT (OUTPATIENT)
Dept: OTOLARYNGOLOGY | Facility: CLINIC | Age: 59
End: 2019-05-20
Payer: COMMERCIAL

## 2019-05-20 VITALS
HEIGHT: 71 IN | DIASTOLIC BLOOD PRESSURE: 73 MMHG | BODY MASS INDEX: 29.12 KG/M2 | SYSTOLIC BLOOD PRESSURE: 109 MMHG | WEIGHT: 208 LBS

## 2019-05-20 DIAGNOSIS — H95.191 ENCOUNTER FOR MASTOIDECTOMY CAVITY DEBRIDEMENT, RIGHT: Primary | ICD-10-CM

## 2019-05-20 PROCEDURE — 69220 CLEAN OUT MASTOID CAVITY: CPT | Mod: RT | Performed by: OTOLARYNGOLOGY

## 2019-05-20 PROCEDURE — 99207 ZZC DROP WITH A PROCEDURE: CPT | Mod: 25 | Performed by: OTOLARYNGOLOGY

## 2019-05-20 ASSESSMENT — MIFFLIN-ST. JEOR: SCORE: 1773.54

## 2019-05-20 ASSESSMENT — PAIN SCALES - GENERAL: PAINLEVEL: NO PAIN (0)
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
## 2019-09-23 ENCOUNTER — OFFICE VISIT (OUTPATIENT)
Dept: OTOLARYNGOLOGY | Facility: CLINIC | Age: 59
End: 2019-09-23
Payer: COMMERCIAL

## 2019-09-23 VITALS
DIASTOLIC BLOOD PRESSURE: 82 MMHG | OXYGEN SATURATION: 96 % | WEIGHT: 198.4 LBS | SYSTOLIC BLOOD PRESSURE: 116 MMHG | HEART RATE: 66 BPM | BODY MASS INDEX: 27.77 KG/M2 | HEIGHT: 71 IN

## 2019-09-23 DIAGNOSIS — H95.191 ENCOUNTER FOR DEBRIDEMENT OF RIGHT POSTMASTOIDECTOMY CAVITY: Primary | ICD-10-CM

## 2019-09-23 PROCEDURE — 99207 ZZC DROP WITH A PROCEDURE: CPT | Mod: 25 | Performed by: OTOLARYNGOLOGY

## 2019-09-23 PROCEDURE — 69220 CLEAN OUT MASTOID CAVITY: CPT | Mod: RT | Performed by: OTOLARYNGOLOGY

## 2019-09-23 ASSESSMENT — PAIN SCALES - GENERAL: PAINLEVEL: NO PAIN (0)

## 2019-09-23 ASSESSMENT — MIFFLIN-ST. JEOR: SCORE: 1730
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
## 2019-10-14 ENCOUNTER — IMMUNIZATION (OUTPATIENT)
Dept: FAMILY MEDICINE | Facility: CLINIC | Age: 59
End: 2019-10-14
Payer: COMMERCIAL

## 2019-10-14 DIAGNOSIS — G63 POLYNEUROPATHY IN OTHER DISEASES CLASSIFIED ELSEWHERE (H): ICD-10-CM

## 2019-10-14 PROCEDURE — 90682 RIV4 VACC RECOMBINANT DNA IM: CPT

## 2019-10-14 PROCEDURE — 90471 IMMUNIZATION ADMIN: CPT

## 2019-10-14 RX ORDER — METHOCARBAMOL 500 MG/1
TABLET, FILM COATED ORAL
Qty: 60 TABLET | Refills: 1 | Status: SHIPPED | OUTPATIENT
Start: 2019-10-14 | End: 2020-03-02

## 2019-10-14 NOTE — TELEPHONE ENCOUNTER
Methocarbamol 500 MG       Last Written Prescription Date:  2/22/19  Last Fill Quantity: 60,   # refills: 1  Last Office Visit: 2/22/19  Future Office visit:       Routing refill request to provider for review/approval because:  Drug not on the [facility], [facility] or [facility] refill protocol or controlled substance

## 2019-12-18 NOTE — MR AVS SNAPSHOT
After Visit Summary   2/1/2017    Shashi Raza    MRN: [id number]           Patient Information     Date Of Birth          1960        Visit Information        Provider Department      2/1/2017 1:30 PM Taryn Bruce MD [facility]        Today's Diagnoses     Olecranon bursitis of right elbow    -  1       Care Instructions    Encounter Diagnosis   Name Primary?     Olecranon bursitis of right elbow Yes     Rest, ice and elevate above heart level as needed for pain control  1. We reviewed your xrays, they look good but you have a bone spur that may be irritating the bursa  2. The bursa is like a deflated water balloon that protects the elbow, but it can get inflamed.  3. If it was swelled up we could take fluid out with a needle and put in some cortisone.  4. Without doing injections, you could do compression (like an ace wrap) or a sleeve.    5. Also NSAIDs like your Celebrex can help.  Ibuprofen is also good.  6. We gave you an ace wrap today.  7. Call us if you ever need the elbow drained, we can work you in.    8. Surgery is rare for this.  9. Follow up with Taryn Bruce MD and Carter Marcum PA-C on an as needed basis.   [employer] and [email or web address] may offer reliable information regarding your diagnosis and treatment plan.    THANK YOU for coming in today. If you receive a survey via [employer] or mail please let us know if there was anything you especially appreciated today or if there is any way we can improve our clinic. We appreciate your input.    GENERAL INFORMATION:  Our hours are:  Monday :     Clinic 7:30 AM-430 PM ([facility])  Tuesday:      Operating Room All Day ([facility])  Wednesday: Clinic 7:30 AM - 11:15 AM ([facility])             Clinic 1:00 PM - 4:00PM ([facility])  Thursday:     Administrative Day  Friday:          Clinic 7:30 AM - 11:15 AM ([facility]  [facility]            Clinic 1:00 PM - 4:00 PM ([facility])    We are not in the office Thursdays. Therefore non- urgent calls and medical messages received on Thursday will be addressed when we are back in the office on Wednesday. Urgent matters will be reviewed and addressed by one of our partners in the office as needed.    If lab work was done today as part of your evaluation you will generally be contacted via [employer], mail, or phone with the results within 1-5 days. If there is an alarming result we will contact you by phone. Lab results come back at varying times, I generally wait until all labs are resulted before making comments on results. Please note labs are automatically released to [employer]abi (if you have signed up for it) once available-at times you may see these prior to my having a chance to review them as well.    If you need refills please contact your pharmacist. They will send a refill request to me to review. Please allow 3 business days for us to process all refill requests. All narcotic refills should be handled in the clinic at the time of your visit.   Bursitis, Elbow (Olecranon)  Your elbow joint contains a small fluid-filled sac called a bursa. The bursa helps the muscles and tendons move smoothly over the bone. It also cushions and protects your elbow. Bursitis is when the bursa is inflamed or swollen. This is most often due to overuse of or injury to the elbow. Symptoms include swelling and pain. If the elbow is red and feels warm to the touch, the bursa itself may be infected.  In most cases, elbow bursitis resolves with medications and self-care at home. It may take 2 to 3 weeks for the bursa to heal and the swelling to go away. In some cases, excess fluid is drained from the bursa. Medication can also be injected directly into the bursa to help relieve symptoms. In severe cases, surgery to remove the bursa may be needed. If there is concern that the bursa is  infected, antibiotics may be prescribed to treat the infection.    Home care  Medications may be prescribed to help relieve pain and swelling. This may be an over-the-counter pain reliever or prescription pain medication. Take all medications as directed. To help treat or prevent infection, antibiotics may be given. If these are prescribed, take them as directed until they are gone.  The following are general care guidelines:    Apply an ice pack or bag of frozen peas wrapped in a thin towel to your elbow for 15 to 20 minutes at a time. Do this 3 to 4 times a day until pain and swelling improve.    Keep your elbow raised above the level of your heart whenever possible. This helps reduce swelling. When sitting or lying down, place your arm on a pillow that rests on your chest or on a pillow at your side.    Use an elastic wrap around the elbow joint to compress the area while it is healing. Make the wrap snug but not tight to the point of causing pain.    Rest your elbow to give it time to heal. You may need to wear an elbow pad to help protect and limit the movement of your elbow. During and after healing, avoid leaning on your elbows.  Follow-up care  Follow up as advised by the doctor or our staff. If you have been referred to a specialist, make that appointment promptly.  When to seek medical care  Get prompt medical attention if any of the following occurs:    Fever of 100.4 F (38 C) or higher    Increased pain, swelling, warmth, redness, or drainage from the joint    Trouble moving the elbow joint    Numbness or tingling in the hand    Severe pain or swelling in forearm or hand    Loss of pink color and slow return of color after squeezing fingertip or hand    6876-3693 [employer] [address] [place] [address]60. All rights reserved. This information is not intended as a substitute for professional medical care. Always follow your healthcare professional's instructions.               "Follow-ups after your visit        Your next 10 appointments already scheduled     2017  3:00 PM   JAIMIE Chiropractor with Marysol Galicia DC   [facility] ([facility])    [address] [place] [id number] [phone]              Who to contact     If you have questions or need follow up information about today's clinic visit or your schedule please contact [facility] directly at [phone].  Normal or non-critical lab and imaging results will be communicated to you by MyChart, letter or phone within 4 business days after the clinic has received the results. If you do not hear from us within 7 days, please contact the clinic through [employer] or phone. If you have a critical or abnormal lab result, we will notify you by phone as soon as possible.  Submit refill requests through [employer] or call your pharmacy and they will forward the refill request to us. Please allow 3 business days for your refill to be completed.          Additional Information About Your Visit        [employer] Information     [employer] lets you send messages to your doctor, view your test results, renew your prescriptions, schedule appointments and more. To sign up, go to [email or web address] . Click on \"Log in\" on the left side of the screen, which will take you to the Welcome page. Then click on \"Sign up Now\" on the right side of the page.     You will be asked to enter the access code listed below, as well as some personal information. Please follow the directions to create your username and password.     Your access code is: [email or web address]  Expires: 2017  2:52 PM     Your access code will  in 90 days. If you need help or a new code, please call your [place] clinic or [phone].        Care EveryWhere ID     This is your Care EveryWhere ID. This could be used by other organizations to access your [place] medical records  [id number]        Your Vitals Were     " "Temperature Height BMI (Body Mass Index)             97  F (36.1  C) 1.801 m (5' 10.9\") 29.37 kg/m2          Blood Pressure from Last 3 Encounters:   01/26/17 104/60   01/04/17 100/60   12/16/16 120/70    Weight from Last 3 Encounters:   02/01/17 95.255 kg (210 lb)   01/26/17 95.255 kg (210 lb)   01/04/17 94.348 kg (208 lb)              Today, you had the following     No orders found for display       Primary Care Provider Office Phone # Fax #    Yoni Kong [phone]       [facility] [address] [place] [address]        Thank you!     Thank you for choosing [facility]  for your care. Our goal is always to provide you with excellent care. Hearing back from our patients is one way we can continue to improve our services. Please take a few minutes to complete the written survey that you may receive in the mail after your visit with us. Thank you!             Your Updated Medication List - Protect others around you: Learn how to safely use, store and throw away your medicines at www.disposemymeds.org.          This list is accurate as of: 2/1/17  1:57 PM.  Always use your most recent med list.                   Brand Name Dispense Instructions for use    atorvastatin 20 MG tablet    LIPITOR    90 tablet    Take 1 tablet (20 mg) by mouth daily         " DISPLAY PLAN FREE TEXT

## 2020-01-06 ENCOUNTER — TELEPHONE (OUTPATIENT)
Dept: FAMILY MEDICINE | Facility: CLINIC | Age: 60
End: 2020-01-06

## 2020-01-06 DIAGNOSIS — Z12.5 SCREENING FOR PROSTATE CANCER: ICD-10-CM

## 2020-01-06 DIAGNOSIS — E78.5 HYPERLIPIDEMIA LDL GOAL <130: Primary | ICD-10-CM

## 2020-01-06 NOTE — TELEPHONE ENCOUNTER
Reason for Call: Request for an order or referral:    Order or referral being requested: complete lab work up    Date needed: before my next appointment    Has the patient been seen by the PCP for this problem? YES    Additional comments: is scheduled for physical on 3/2/20, and wants to have lab done prior. Please call and let patient know and assist in scheduling.     Phone number Patient can be reached at:  Home number on file [phone] (home)    Best Time:  Any time    Can we leave a detailed message on this number?  YES    Call taken on 1/6/2020 at 4:41 PM by Nikki Knowles

## 2020-01-08 NOTE — TELEPHONE ENCOUNTER
Orders for PSA screen, CMP and lipid placed as future orders. Please sign if appropriate.  Kacey Corcoran on 1/8/2020 at 12:05 PM
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
## 2020-02-03 ENCOUNTER — OFFICE VISIT (OUTPATIENT)
Dept: OTOLARYNGOLOGY | Facility: CLINIC | Age: 60
End: 2020-02-03
Payer: COMMERCIAL

## 2020-02-03 VITALS
DIASTOLIC BLOOD PRESSURE: 82 MMHG | HEIGHT: 71 IN | SYSTOLIC BLOOD PRESSURE: 118 MMHG | WEIGHT: 202.3 LBS | BODY MASS INDEX: 28.32 KG/M2

## 2020-02-03 DIAGNOSIS — H66.21 CHRONIC ATTICOANTRAL SUPPURATIVE OTITIS MEDIA OF RIGHT EAR: Primary | ICD-10-CM

## 2020-02-03 DIAGNOSIS — H70.10 CHRONIC INFLAMMATION OF MASTOID CAVITY: ICD-10-CM

## 2020-02-03 PROCEDURE — 99207 ZZC DROP WITH A PROCEDURE: CPT | Performed by: OTOLARYNGOLOGY

## 2020-02-03 PROCEDURE — 69220 CLEAN OUT MASTOID CAVITY: CPT | Mod: RT | Performed by: OTOLARYNGOLOGY

## 2020-02-03 RX ORDER — CIPROFLOXACIN AND DEXAMETHASONE 3; 1 MG/ML; MG/ML
4 SUSPENSION/ DROPS AURICULAR (OTIC) 2 TIMES DAILY
Qty: 4 ML | Refills: 0 | Status: SHIPPED | OUTPATIENT
Start: 2020-02-03 | End: 2020-02-13

## 2020-02-03 ASSESSMENT — MIFFLIN-ST. JEOR: SCORE: 1742.69
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
## 2020-02-24 ENCOUNTER — OFFICE VISIT (OUTPATIENT)
Dept: OTOLARYNGOLOGY | Facility: CLINIC | Age: 60
End: 2020-02-24
Payer: COMMERCIAL

## 2020-02-24 VITALS
HEIGHT: 71 IN | SYSTOLIC BLOOD PRESSURE: 100 MMHG | DIASTOLIC BLOOD PRESSURE: 62 MMHG | WEIGHT: 202.4 LBS | BODY MASS INDEX: 28.34 KG/M2 | TEMPERATURE: 97.1 F

## 2020-02-24 DIAGNOSIS — E78.5 HYPERLIPIDEMIA LDL GOAL <130: ICD-10-CM

## 2020-02-24 DIAGNOSIS — Z12.5 SCREENING FOR PROSTATE CANCER: ICD-10-CM

## 2020-02-24 DIAGNOSIS — H61.21 IMPACTED CERUMEN OF RIGHT EAR: Primary | ICD-10-CM

## 2020-02-24 LAB
ALBUMIN SERPL-MCNC: 3.9 G/DL (ref 3.4–5)
ALP SERPL-CCNC: 55 U/L (ref 40–150)
ALT SERPL W P-5'-P-CCNC: 28 U/L (ref 0–70)
ANION GAP SERPL CALCULATED.3IONS-SCNC: 5 MMOL/L (ref 3–14)
AST SERPL W P-5'-P-CCNC: 17 U/L (ref 0–45)
BILIRUB SERPL-MCNC: 0.5 MG/DL (ref 0.2–1.3)
BUN SERPL-MCNC: 16 MG/DL (ref 7–30)
CALCIUM SERPL-MCNC: 9 MG/DL (ref 8.5–10.1)
CHLORIDE SERPL-SCNC: 109 MMOL/L (ref 94–109)
CHOLEST SERPL-MCNC: 130 MG/DL
CO2 SERPL-SCNC: 28 MMOL/L (ref 20–32)
CREAT SERPL-MCNC: 1.05 MG/DL (ref 0.66–1.25)
GFR SERPL CREATININE-BSD FRML MDRD: 76 ML/MIN/{1.73_M2}
GLUCOSE SERPL-MCNC: 96 MG/DL (ref 70–99)
HDLC SERPL-MCNC: 45 MG/DL
LDLC SERPL CALC-MCNC: 61 MG/DL
NONHDLC SERPL-MCNC: 85 MG/DL
POTASSIUM SERPL-SCNC: 4.5 MMOL/L (ref 3.4–5.3)
PROT SERPL-MCNC: 7 G/DL (ref 6.8–8.8)
PSA SERPL-ACNC: 1.55 UG/L (ref 0–4)
SODIUM SERPL-SCNC: 142 MMOL/L (ref 133–144)
TRIGL SERPL-MCNC: 120 MG/DL

## 2020-02-24 PROCEDURE — 99207 ZZC DROP WITH A PROCEDURE: CPT | Performed by: OTOLARYNGOLOGY

## 2020-02-24 PROCEDURE — 80053 COMPREHEN METABOLIC PANEL: CPT | Performed by: FAMILY MEDICINE

## 2020-02-24 PROCEDURE — 80061 LIPID PANEL: CPT | Performed by: FAMILY MEDICINE

## 2020-02-24 PROCEDURE — 36415 COLL VENOUS BLD VENIPUNCTURE: CPT | Performed by: FAMILY MEDICINE

## 2020-02-24 PROCEDURE — G0103 PSA SCREENING: HCPCS | Performed by: FAMILY MEDICINE

## 2020-02-24 PROCEDURE — 69210 REMOVE IMPACTED EAR WAX UNI: CPT | Mod: RT | Performed by: OTOLARYNGOLOGY

## 2020-02-24 ASSESSMENT — MIFFLIN-ST. JEOR: SCORE: 1743.14
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
## 2020-03-02 ENCOUNTER — OFFICE VISIT (OUTPATIENT)
Dept: FAMILY MEDICINE | Facility: CLINIC | Age: 60
End: 2020-03-02
Payer: COMMERCIAL

## 2020-03-02 VITALS
HEIGHT: 70 IN | HEART RATE: 88 BPM | BODY MASS INDEX: 28.49 KG/M2 | SYSTOLIC BLOOD PRESSURE: 102 MMHG | TEMPERATURE: 96.9 F | DIASTOLIC BLOOD PRESSURE: 74 MMHG | WEIGHT: 199 LBS | OXYGEN SATURATION: 98 % | RESPIRATION RATE: 14 BRPM

## 2020-03-02 DIAGNOSIS — M54.50 BILATERAL LOW BACK PAIN WITHOUT SCIATICA, UNSPECIFIED CHRONICITY: ICD-10-CM

## 2020-03-02 DIAGNOSIS — G63 POLYNEUROPATHY IN OTHER DISEASES CLASSIFIED ELSEWHERE (H): ICD-10-CM

## 2020-03-02 DIAGNOSIS — Z00.00 ROUTINE GENERAL MEDICAL EXAMINATION AT A HEALTH CARE FACILITY: Primary | ICD-10-CM

## 2020-03-02 DIAGNOSIS — M75.81 RIGHT ROTATOR CUFF TENDINITIS: ICD-10-CM

## 2020-03-02 DIAGNOSIS — E78.5 HYPERLIPIDEMIA LDL GOAL <130: ICD-10-CM

## 2020-03-02 PROCEDURE — 99396 PREV VISIT EST AGE 40-64: CPT | Performed by: FAMILY MEDICINE

## 2020-03-02 PROCEDURE — 99213 OFFICE O/P EST LOW 20 MIN: CPT | Mod: 25 | Performed by: FAMILY MEDICINE

## 2020-03-02 RX ORDER — METHOCARBAMOL 500 MG/1
1000 TABLET, FILM COATED ORAL 3 TIMES DAILY PRN
Qty: 90 TABLET | Refills: 3 | Status: SHIPPED | OUTPATIENT
Start: 2020-03-02 | End: 2020-10-21

## 2020-03-02 RX ORDER — MELOXICAM 15 MG/1
15 TABLET ORAL DAILY
Qty: 60 TABLET | Refills: 1 | Status: ON HOLD | OUTPATIENT
Start: 2020-03-02 | End: 2022-12-14

## 2020-03-02 RX ORDER — ATORVASTATIN CALCIUM 20 MG/1
20 TABLET, FILM COATED ORAL DAILY
Qty: 90 TABLET | Refills: 3 | Status: SHIPPED | OUTPATIENT
Start: 2020-03-02 | End: 2021-03-04

## 2020-03-02 ASSESSMENT — PAIN SCALES - GENERAL: PAINLEVEL: MILD PAIN (3)

## 2020-03-02 ASSESSMENT — ENCOUNTER SYMPTOMS
ABDOMINAL PAIN: 0
CHILLS: 0
HEMATURIA: 0
DIARRHEA: 0
HEMATOCHEZIA: 0
COUGH: 0
CONSTIPATION: 0

## 2020-03-02 ASSESSMENT — MIFFLIN-ST. JEOR: SCORE: 1713.91

## 2020-03-02 NOTE — PROGRESS NOTES
Health Maintenance reviewed - patient notified of Vaccine due not interested, and was given the Health care directive to review .
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
## 2020-06-22 ENCOUNTER — APPOINTMENT (OUTPATIENT)
Dept: MRI IMAGING | Facility: CLINIC | Age: 60
End: 2020-06-22
Attending: EMERGENCY MEDICINE
Payer: MEDICARE

## 2020-06-22 ENCOUNTER — HOSPITAL ENCOUNTER (EMERGENCY)
Facility: CLINIC | Age: 60
Discharge: HOME | End: 2020-06-22
Attending: EMERGENCY MEDICINE | Admitting: EMERGENCY MEDICINE
Payer: MEDICARE

## 2020-06-22 ENCOUNTER — OFFICE VISIT (OUTPATIENT)
Dept: FAMILY MEDICINE | Facility: OTHER | Age: 60
End: 2020-06-22
Payer: MEDICARE

## 2020-06-22 VITALS
SYSTOLIC BLOOD PRESSURE: 125 MMHG | HEART RATE: 73 BPM | DIASTOLIC BLOOD PRESSURE: 81 MMHG | WEIGHT: 200 LBS | TEMPERATURE: 98.5 F | BODY MASS INDEX: 28.63 KG/M2 | RESPIRATION RATE: 17 BRPM | OXYGEN SATURATION: 98 % | HEIGHT: 70 IN

## 2020-06-22 VITALS
RESPIRATION RATE: 16 BRPM | DIASTOLIC BLOOD PRESSURE: 64 MMHG | OXYGEN SATURATION: 94 % | SYSTOLIC BLOOD PRESSURE: 106 MMHG | HEART RATE: 82 BPM | TEMPERATURE: 98.8 F

## 2020-06-22 DIAGNOSIS — M54.16 LUMBAR RADICULOPATHY: ICD-10-CM

## 2020-06-22 DIAGNOSIS — M48.062 SPINAL STENOSIS OF LUMBAR REGION WITH NEUROGENIC CLAUDICATION: ICD-10-CM

## 2020-06-22 DIAGNOSIS — R29.898 WEAKNESS OF BOTH LOWER EXTREMITIES: Primary | ICD-10-CM

## 2020-06-22 PROCEDURE — 96372 THER/PROPH/DIAG INJ SC/IM: CPT | Performed by: EMERGENCY MEDICINE

## 2020-06-22 PROCEDURE — 25000128 H RX IP 250 OP 636: Performed by: EMERGENCY MEDICINE

## 2020-06-22 PROCEDURE — 99285 EMERGENCY DEPT VISIT HI MDM: CPT | Mod: 25 | Performed by: EMERGENCY MEDICINE

## 2020-06-22 PROCEDURE — 99284 EMERGENCY DEPT VISIT MOD MDM: CPT | Mod: Z6 | Performed by: EMERGENCY MEDICINE

## 2020-06-22 PROCEDURE — 72148 MRI LUMBAR SPINE W/O DYE: CPT

## 2020-06-22 PROCEDURE — 99214 OFFICE O/P EST MOD 30 MIN: CPT | Performed by: FAMILY MEDICINE

## 2020-06-22 RX ORDER — METHYLPREDNISOLONE 4 MG
TABLET, DOSE PACK ORAL
Qty: 21 TABLET | Refills: 0 | Status: SHIPPED | OUTPATIENT
Start: 2020-06-22 | End: 2020-07-20

## 2020-06-22 RX ORDER — GABAPENTIN 300 MG/1
300 CAPSULE ORAL 3 TIMES DAILY
Qty: 60 CAPSULE | Refills: 0 | Status: SHIPPED | OUTPATIENT
Start: 2020-06-22 | End: 2020-07-20

## 2020-06-22 RX ORDER — OXYCODONE AND ACETAMINOPHEN 5; 325 MG/1; MG/1
1-2 TABLET ORAL EVERY 4 HOURS PRN
Qty: 18 TABLET | Refills: 0 | Status: SHIPPED | OUTPATIENT
Start: 2020-06-22 | End: 2020-08-20

## 2020-06-22 RX ADMIN — HYDROMORPHONE HYDROCHLORIDE 1 MG: 1 INJECTION, SOLUTION INTRAMUSCULAR; INTRAVENOUS; SUBCUTANEOUS at 12:55

## 2020-06-22 ASSESSMENT — MIFFLIN-ST. JEOR: SCORE: 1718.44

## 2020-06-22 ASSESSMENT — PAIN SCALES - GENERAL: PAINLEVEL: MODERATE PAIN (5)
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
## 2020-06-22 NOTE — PATIENT INSTRUCTIONS
Shashi,    It was great seeing you in clinic today.  I summarized our discussion and your plan below.  Please let me know if you have any questions or concerns.  Please follow up with me as discussed in clinic or sooner if any worsening or additional concerns.     1. Weakness of both lower extremities  59-year-old male with 3-day history of progressive lower extremity pain and weakness, pain predominantly saddle region.  He does have a history of spinal stenosis, multiple low back surgeries in the past.  Was previously doing well up until the last 3 days.  On exam he does have some S1 weakness with large toe dorsiflexion, concerned about progressing neurologic issue such as cauda equina.  Called and gave report to emergency room at [place], patient and his wife declined EMS, on their way to [place] ER.    2. Spinal stenosis of lumbar region with neurogenic claudication  See above.       Sincerely,  Dr. ARNULFO Bowser MD, FAAFP  Family Medicine Physician  [facility] [place] [place]  [address] [place] [address]    Patient Education

## 2020-06-22 NOTE — DISCHARGE INSTRUCTIONS
Take Percocet for pain.  Medrol Dosepak as directed.  This can cause stomach upset so take it with food.  Neurontin 300 mg 3 times daily.  Dr. Panda's office should contact you for an appointment.  Currently he is just working out of [facility].  If the above medicines do not adequately control your pain, you develop fever, if you develop numbness around your rectum or you lose bowel or bladder you should return to the emergency room immediately.
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
## 2020-07-01 ENCOUNTER — VIRTUAL VISIT (OUTPATIENT)
Dept: NEUROSURGERY | Facility: CLINIC | Age: 60
End: 2020-07-01
Attending: PHYSICIAN ASSISTANT
Payer: MEDICARE

## 2020-07-01 DIAGNOSIS — M54.41 ACUTE BILATERAL LOW BACK PAIN WITH BILATERAL SCIATICA: Primary | ICD-10-CM

## 2020-07-01 DIAGNOSIS — M54.42 ACUTE BILATERAL LOW BACK PAIN WITH BILATERAL SCIATICA: Primary | ICD-10-CM

## 2020-07-01 PROCEDURE — 99201 ZZC OFFICE/OUTPT VISIT, NEW, LEVEL I: CPT | Mod: TEL | Performed by: PHYSICIAN ASSISTANT

## 2020-07-01 NOTE — PROGRESS NOTES
"Shashi Raza is a 59 year old male who is being evaluated via a billable telephone visit.      Due to COVID-19 this visit has been performed over the phone verses face to face.     The patient has been notified of following:     \"This telephone visit will be conducted via a call between you and your physician/provider. We have found that certain health care needs can be provided without the need for a physical exam.  This service lets us provide the care you need with a short phone conversation.  If a prescription is necessary we can send it directly to your pharmacy.  If lab work is needed we can place an order for that and you can then stop by our lab to have the test done at a later time.    If during the course of the call the physician/provider feels a telephone visit is not appropriate, you will not be charged for this service.\"     Shashi Raza complains of    Chief Complaint   Patient presents with     Neurologic Problem       I have reviewed and updated the patient's Past Medical History, Social History, Family History and Medication List.    ALLERGIES  Codeine    Additional provider notes:    Shashi Raza is a 59 year old male who is referred to us from the emergency room in [place] for evaluation of sudden onset bilateral hip and leg pain that began about a week and half ago.  Patient states his symptoms came on suddenly without any particular trauma or injury.  He notes that initially he had bilateral symptoms pain in both of his hips pain in his feet pain going down his legs and has noted that since that time his symptoms have improved dramatically.  He still has some pain in his hips and is sore but he is a now able to walk much better than he was just a few days ago.  He denies any weakness in his legs denies numbness, denies any bowel or bladder symptoms or saddle anesthesia.  He had a right hemilaminectomy at L5-S1 many years ago.  He was given gabapentin and prednisone and a muscle relaxer in " the ER.    Lumbar MRI    A new right L4-5 foraminal disc bulge was noticed which contributes to foraminal stenosis on the right.  A similar appearing disc bulge at L5-S1 was present and unchanged from prior imaging.    Assessment    Back pain  Hip pain  Bilateral lower extremity radiculopathy    Plan:    As the patient is beginning to improve spontaneously without significant intervention I would recommend he continue to give this time and see how he progresses over the next 2 to 3 weeks.  If his symptoms resolve then no further follow-up is needed if his symptoms worsen or if he has any other concerns he should contact our clinic.  He can continue taking the medications he was prescribed in the ER as necessary and follow-up with his primary care provider for further weaning of gabapentin in the future if needed.    Phone call duration: 7 minutes  Start Time 152  End Time 159    Sara Villar PA-C    Patient provided verbal consent for the phone visit today.

## 2020-07-01 NOTE — LETTER
"    7/1/2020         RE: Shashi Raza  [address]  [facility] [address]        Dear Colleague,    Thank you for referring your patient, Shashi Raza, to the [facility]. Please see a copy of my visit note below.    Shashi Raza is a 59 year old male who is being evaluated via a billable telephone visit.      Due to COVID-19 this visit has been performed over the phone verses face to face.     The patient has been notified of following:     \"This telephone visit will be conducted via a call between you and your physician/provider. We have found that certain health care needs can be provided without the need for a physical exam.  This service lets us provide the care you need with a short phone conversation.  If a prescription is necessary we can send it directly to your pharmacy.  If lab work is needed we can place an order for that and you can then stop by our lab to have the test done at a later time.    If during the course of the call the physician/provider feels a telephone visit is not appropriate, you will not be charged for this service.\"     Shashi Raza complains of    Chief Complaint   Patient presents with     Neurologic Problem       I have reviewed and updated the patient's Past Medical History, Social History, Family History and Medication List.    ALLERGIES  Codeine    Additional provider notes:    Shashi Raza is a 59 year old male who is referred to us from the emergency room in [place] for evaluation of sudden onset bilateral hip and leg pain that began about a week and half ago.  Patient states his symptoms came on suddenly without any particular trauma or injury.  He notes that initially he had bilateral symptoms pain in both of his hips pain in his feet pain going down his legs and has noted that since that time his symptoms have improved dramatically.  He still has some pain in his hips and is sore but he is a now able to walk much better than he was " just a few days ago.  He denies any weakness in his legs denies numbness, denies any bowel or bladder symptoms or saddle anesthesia.  He had a right hemilaminectomy at L5-S1 many years ago.  He was given gabapentin and prednisone and a muscle relaxer in the ER.    Lumbar MRI    A new right L4-5 foraminal disc bulge was noticed which contributes to foraminal stenosis on the right.  A similar appearing disc bulge at L5-S1 was present and unchanged from prior imaging.    Assessment    Back pain  Hip pain  Bilateral lower extremity radiculopathy    Plan:    As the patient is beginning to improve spontaneously without significant intervention I would recommend he continue to give this time and see how he progresses over the next 2 to 3 weeks.  If his symptoms resolve then no further follow-up is needed if his symptoms worsen or if he has any other concerns he should contact our clinic.  He can continue taking the medications he was prescribed in the ER as necessary and follow-up with his primary care provider for further weaning of gabapentin in the future if needed.    Phone call duration: 7 minutes  Start Time 152  End Time 159    Sara Villar PA-C    Patient provided verbal consent for the phone visit today.     Again, thank you for allowing me to participate in the care of your patient.        Sincerely,        Sara Villar PA-C

## 2020-07-20 ENCOUNTER — TELEPHONE (OUTPATIENT)
Dept: FAMILY MEDICINE | Facility: CLINIC | Age: 60
End: 2020-07-20

## 2020-07-20 ENCOUNTER — TELEPHONE (OUTPATIENT)
Dept: NEUROSURGERY | Facility: CLINIC | Age: 60
End: 2020-07-20

## 2020-07-20 DIAGNOSIS — M48.062 SPINAL STENOSIS OF LUMBAR REGION WITH NEUROGENIC CLAUDICATION: Primary | ICD-10-CM

## 2020-07-20 RX ORDER — GABAPENTIN 300 MG/1
300 CAPSULE ORAL 3 TIMES DAILY
Qty: 90 CAPSULE | Refills: 0 | Status: SHIPPED | OUTPATIENT
Start: 2020-07-20 | End: 2020-08-21

## 2020-07-20 RX ORDER — METHYLPREDNISOLONE 4 MG
TABLET, DOSE PACK ORAL
Qty: 21 TABLET | Refills: 0 | Status: SHIPPED | OUTPATIENT
Start: 2020-07-20 | End: 2020-08-07

## 2020-07-20 NOTE — TELEPHONE ENCOUNTER
Patient did ween himself off of the gabapentin and now he can barley walk.  He is waiting for a call back to see the neurosurgeon but in the mean time he is asking if you could give him Gabapentin and methylprednisolone again so he can walk again.

## 2020-07-20 NOTE — TELEPHONE ENCOUNTER
Reason for Call:  Other      Detailed comments: Shashi was seen back on 6/22 for back pain and had a MRI done at that time. He states he that walking is almost impossible with the the pain he is having and requesting a call from you to discuss what his next step should be. Offered to schedule an appointment for him and he said he can barely walk and would like to talk to you first before going back to the ED. He is wondering if he should have an appointment with a surgeon.     Phone Number Patient can be reached at: Home number on file [phone] (home)    Best Time: any     Can we leave a detailed message on this number? YES    Call taken on 7/20/2020 at 8:12 AM by Shadia Knog

## 2020-08-05 NOTE — TELEPHONE ENCOUNTER
LAST VISIT: 7/1/20  NEXT VISIT: N/A    NAME OF CALLER: Shashi  NAME OF CLINICIAN: Sara Villar PA-C    ASSESSMENT: Pt LVM on RN line. He states that about 2 days ago his leg pain started to increase. He states that he is continuing to have bilateral pain R > L. Pain starts in the groin and goes down both legs. Describes pain as constant and shooting. Aggravating factors include walking and standing. Relieving factors include lying down and using walker to walk. He states that he has not taken Tylenol or Ibuprofen and is not interested in using Narcotics. He does not have any new numbness/tingling or weakness. He is taking Gabapentin as prescribed.     RECOMMENDATION GIVEN: Informed the patient that I will reach out to the care team to see if it is appropriate for him to come back into the clinic to be re-evaluated. Patient is in agreement with this plan.

## 2020-08-07 ENCOUNTER — TELEPHONE (OUTPATIENT)
Dept: NEUROSURGERY | Facility: CLINIC | Age: 60
End: 2020-08-07

## 2020-08-07 ENCOUNTER — OFFICE VISIT (OUTPATIENT)
Dept: NEUROLOGY | Facility: CLINIC | Age: 60
End: 2020-08-07
Payer: MEDICARE

## 2020-08-07 ENCOUNTER — ANCILLARY PROCEDURE (OUTPATIENT)
Dept: GENERAL RADIOLOGY | Facility: CLINIC | Age: 60
End: 2020-08-07
Attending: PHYSICIAN ASSISTANT
Payer: MEDICARE

## 2020-08-07 VITALS
DIASTOLIC BLOOD PRESSURE: 77 MMHG | BODY MASS INDEX: 28.63 KG/M2 | HEART RATE: 55 BPM | SYSTOLIC BLOOD PRESSURE: 116 MMHG | WEIGHT: 200 LBS | HEIGHT: 70 IN | OXYGEN SATURATION: 98 % | RESPIRATION RATE: 12 BRPM

## 2020-08-07 DIAGNOSIS — M48.062 SPINAL STENOSIS OF LUMBAR REGION WITH NEUROGENIC CLAUDICATION: ICD-10-CM

## 2020-08-07 DIAGNOSIS — M25.551 HIP PAIN, RIGHT: ICD-10-CM

## 2020-08-07 DIAGNOSIS — M25.551 HIP PAIN, RIGHT: Primary | ICD-10-CM

## 2020-08-07 DIAGNOSIS — M54.42 ACUTE BILATERAL LOW BACK PAIN WITH BILATERAL SCIATICA: Primary | ICD-10-CM

## 2020-08-07 DIAGNOSIS — M54.41 ACUTE BILATERAL LOW BACK PAIN WITH BILATERAL SCIATICA: Primary | ICD-10-CM

## 2020-08-07 PROCEDURE — 73502 X-RAY EXAM HIP UNI 2-3 VIEWS: CPT | Mod: RT | Performed by: RADIOLOGY

## 2020-08-07 PROCEDURE — 99213 OFFICE O/P EST LOW 20 MIN: CPT | Performed by: PHYSICIAN ASSISTANT

## 2020-08-07 RX ORDER — METHYLPREDNISOLONE 4 MG
TABLET, DOSE PACK ORAL
Qty: 21 TABLET | Refills: 0 | Status: SHIPPED | OUTPATIENT
Start: 2020-08-07 | End: 2021-03-04

## 2020-08-07 ASSESSMENT — MIFFLIN-ST. JEOR: SCORE: 1718.44
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
## 2020-08-07 NOTE — TELEPHONE ENCOUNTER
"Per Sara Villar PA-C: \"Please let the patient know that his right hip x-ray is normal.  I would recommend he continue taking his steroid medication. If he is still having symptoms on Monday, he should call the clinic and we will consider ordering a lumber ADOLFO. \"     Attempted to reach patient. Went straight to . LM, awaiting call back.           "

## 2020-08-07 NOTE — TELEPHONE ENCOUNTER
Routing refill request to provider for review/approval because:  Drug not on the [facility] refill protocol     DANYELLE Omalley, RN  [facility]
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
## 2020-08-10 ENCOUNTER — HOSPITAL ENCOUNTER (OUTPATIENT)
Facility: CLINIC | Age: 60
End: 2020-08-10
Attending: ANESTHESIOLOGY | Admitting: ANESTHESIOLOGY

## 2020-08-10 DIAGNOSIS — Z11.59 ENCOUNTER FOR SCREENING FOR OTHER VIRAL DISEASES: Primary | ICD-10-CM

## 2020-08-10 NOTE — TELEPHONE ENCOUNTER
Patient called and stated that while his pain is slightly improving, he is still in quite a bit of pain and would like to move forward with getting the ADOLFO. Will route message to Sara Villar PA-C.

## 2020-08-21 ENCOUNTER — OFFICE VISIT (OUTPATIENT)
Dept: FAMILY MEDICINE | Facility: CLINIC | Age: 60
End: 2020-08-21
Payer: MEDICARE

## 2020-08-21 VITALS
SYSTOLIC BLOOD PRESSURE: 118 MMHG | WEIGHT: 192.8 LBS | HEART RATE: 80 BPM | OXYGEN SATURATION: 98 % | DIASTOLIC BLOOD PRESSURE: 68 MMHG | TEMPERATURE: 97.8 F | RESPIRATION RATE: 14 BRPM | BODY MASS INDEX: 27.66 KG/M2

## 2020-08-21 DIAGNOSIS — Z01.818 PREOP GENERAL PHYSICAL EXAM: Primary | ICD-10-CM

## 2020-08-21 DIAGNOSIS — M48.062 SPINAL STENOSIS OF LUMBAR REGION WITH NEUROGENIC CLAUDICATION: ICD-10-CM

## 2020-08-21 PROCEDURE — 99214 OFFICE O/P EST MOD 30 MIN: CPT | Performed by: FAMILY MEDICINE

## 2020-08-21 RX ORDER — GABAPENTIN 300 MG/1
300 CAPSULE ORAL 3 TIMES DAILY
Qty: 90 CAPSULE | Refills: 1 | Status: SHIPPED | OUTPATIENT
Start: 2020-08-21 | End: 2021-01-04

## 2020-08-21 RX ORDER — PREDNISONE 20 MG/1
TABLET ORAL
Qty: 18 TABLET | Refills: 1 | Status: SHIPPED | OUTPATIENT
Start: 2020-08-21 | End: 2021-01-04

## 2020-08-21 NOTE — PATIENT INSTRUCTIONS
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
## 2020-10-20 DIAGNOSIS — G63 POLYNEUROPATHY IN OTHER DISEASES CLASSIFIED ELSEWHERE (H): ICD-10-CM

## 2020-10-20 NOTE — TELEPHONE ENCOUNTER
Robaxin      Last Written Prescription Date:  3/2/2020  Last Fill Quantity: 90,   # refills: 3  Last Office Visit: 8/21/2020  Future Office visit:       Routing refill request to provider for review/approval because:  Drug not on the [facility], P or [facility] refill protocol or controlled substance

## 2020-10-21 RX ORDER — METHOCARBAMOL 500 MG/1
TABLET, FILM COATED ORAL
Qty: 90 TABLET | Refills: 3 | Status: SHIPPED | OUTPATIENT
Start: 2020-10-21 | End: 2022-01-04

## 2020-10-30 ENCOUNTER — TELEPHONE (OUTPATIENT)
Dept: FAMILY MEDICINE | Facility: CLINIC | Age: 60
End: 2020-10-30
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
## 2020-10-30 NOTE — TELEPHONE ENCOUNTER
Reason for Call:  Other     Detailed comments: Shashi calls check on forms he faxed over to Dr Kong on 10/21 from [employer] were received and if they have been filled out yet.    Phone Number Patient can be reached at: Home number on file [phone] (home)    Best Time: any    Can we leave a detailed message on this number? YES    Call taken on 10/30/2020 at 11:36 AM by Gisele Mahajan

## 2021-01-04 DIAGNOSIS — M48.062 SPINAL STENOSIS OF LUMBAR REGION WITH NEUROGENIC CLAUDICATION: ICD-10-CM

## 2021-01-04 RX ORDER — PREDNISONE 20 MG/1
TABLET ORAL
Qty: 18 TABLET | Refills: 1 | Status: SHIPPED | OUTPATIENT
Start: 2021-01-04 | End: 2021-04-14

## 2021-01-04 RX ORDER — GABAPENTIN 300 MG/1
CAPSULE ORAL
Qty: 90 CAPSULE | Refills: 1 | Status: SHIPPED | OUTPATIENT
Start: 2021-01-04 | End: 2021-04-14

## 2021-01-04 NOTE — TELEPHONE ENCOUNTER
Requested Prescriptions   Pending Prescriptions Disp Refills     gabapentin (NEURONTIN) 300 MG capsule [Pharmacy Med Name: GABAPENTIN 300MG CAPS] 90 capsule 1     Sig: TAKE ONE CAPSULE BY MOUTH THREE TIMES A DAY       Last Written Prescription Date:  08/21/2020  Last Fill Quantity: 90,   # refills: 1  Last Office Visit: 08/21/2020  Future Office visit:    Next 5 appointments (look out 90 days)    Mar 04, 2021  9:00 AM  PHYSICAL with Yoni Kong MD  [facility] ([address] [address]  [phone]           Routing refill request to provider for review/approval because:  Drug not on the [facility], P or  Health refill protocol or controlled substance              predniSONE (DELTASONE) 20 MG tablet [Pharmacy Med Name: PREDNISONE 20MG TABS] 18 tablet 1     Sig: TAKE THREE TABLETS BY MOUTH ONCE DAILY FOR 3 DAYS THEN TAKE TWO TABLETS BY MOUTH ONCE DAILY FOR 3 DAYS THEN TAKE ONE TABLET BY MOUTH ONCE DAILY FOR 3 DAYS     Last Written Prescription Date:  08/21/2020  Last Fill Quantity: 18,   # refills: 1  Last Office Visit: 08/21/2020  Future Office visit:    Next 5 appointments (look out 90 days)    Mar 04, 2021  9:00 AM  PHYSICAL with Yoni Kong MD  [facility] ([address] [address] [phone]           Routing refill request to provider for review/approval because:  Drug not on the G, P or  Health refill protocol or controlled substance    Sharita Smith MA

## 2021-01-21 ENCOUNTER — OFFICE VISIT (OUTPATIENT)
Dept: FAMILY MEDICINE | Facility: CLINIC | Age: 61
End: 2021-01-21
Payer: COMMERCIAL

## 2021-01-21 VITALS
DIASTOLIC BLOOD PRESSURE: 78 MMHG | RESPIRATION RATE: 16 BRPM | WEIGHT: 191.5 LBS | SYSTOLIC BLOOD PRESSURE: 110 MMHG | TEMPERATURE: 97.6 F | OXYGEN SATURATION: 96 % | BODY MASS INDEX: 27.41 KG/M2 | HEART RATE: 90 BPM | HEIGHT: 70 IN

## 2021-01-21 DIAGNOSIS — R10.31 RLQ ABDOMINAL PAIN: Primary | ICD-10-CM

## 2021-01-21 LAB
ALBUMIN UR-MCNC: NEGATIVE MG/DL
APPEARANCE UR: CLEAR
BASOPHILS # BLD AUTO: 0 10E9/L (ref 0–0.2)
BASOPHILS NFR BLD AUTO: 0.7 %
BILIRUB UR QL STRIP: NEGATIVE
COLOR UR AUTO: YELLOW
DIFFERENTIAL METHOD BLD: NORMAL
EOSINOPHIL NFR BLD AUTO: 3.7 %
ERYTHROCYTE [DISTWIDTH] IN BLOOD BY AUTOMATED COUNT: 12.8 % (ref 10–15)
ERYTHROCYTE [SEDIMENTATION RATE] IN BLOOD BY WESTERGREN METHOD: 3 MM/H (ref 0–20)
GLUCOSE UR STRIP-MCNC: NEGATIVE MG/DL
HCT VFR BLD AUTO: 48.4 % (ref 40–53)
HGB BLD-MCNC: 16.7 G/DL (ref 13.3–17.7)
HGB UR QL STRIP: ABNORMAL
IMM GRANULOCYTES # BLD: 0 10E9/L (ref 0–0.4)
IMM GRANULOCYTES NFR BLD: 0.3 %
KETONES UR STRIP-MCNC: NEGATIVE MG/DL
LEUKOCYTE ESTERASE UR QL STRIP: NEGATIVE
LYMPHOCYTES # BLD AUTO: 1.6 10E9/L (ref 0.8–5.3)
LYMPHOCYTES NFR BLD AUTO: 26.1 %
MCH RBC QN AUTO: 31.3 PG (ref 26.5–33)
MCHC RBC AUTO-ENTMCNC: 34.5 G/DL (ref 31.5–36.5)
MCV RBC AUTO: 91 FL (ref 78–100)
MONOCYTES # BLD AUTO: 0.6 10E9/L (ref 0–1.3)
MONOCYTES NFR BLD AUTO: 9.8 %
MUCOUS THREADS #/AREA URNS LPF: PRESENT /LPF
NEUTROPHILS # BLD AUTO: 3.5 10E9/L (ref 1.6–8.3)
NEUTROPHILS NFR BLD AUTO: 59.4 %
NITRATE UR QL: NEGATIVE
NRBC # BLD AUTO: 0 10*3/UL
NRBC BLD AUTO-RTO: 0 /100
PH UR STRIP: 5 PH (ref 5–7)
PLATELET # BLD AUTO: 161 10E9/L (ref 150–450)
RBC # BLD AUTO: 5.34 10E12/L (ref 4.4–5.9)
RBC #/AREA URNS AUTO: <1 /HPF (ref 0–2)
SOURCE: ABNORMAL
SP GR UR STRIP: 1.01 (ref 1–1.03)
UROBILINOGEN UR STRIP-MCNC: 0 MG/DL (ref 0–2)
WBC # BLD AUTO: 5.9 10E9/L (ref 4–11)
WBC #/AREA URNS AUTO: <1 /HPF (ref 0–5)

## 2021-01-21 PROCEDURE — 85025 COMPLETE CBC W/AUTO DIFF WBC: CPT | Performed by: FAMILY MEDICINE

## 2021-01-21 PROCEDURE — 36415 COLL VENOUS BLD VENIPUNCTURE: CPT | Performed by: FAMILY MEDICINE

## 2021-01-21 PROCEDURE — 85652 RBC SED RATE AUTOMATED: CPT | Performed by: FAMILY MEDICINE

## 2021-01-21 PROCEDURE — 81001 URINALYSIS AUTO W/SCOPE: CPT | Performed by: FAMILY MEDICINE

## 2021-01-21 PROCEDURE — 99214 OFFICE O/P EST MOD 30 MIN: CPT | Performed by: FAMILY MEDICINE

## 2021-01-21 ASSESSMENT — PAIN SCALES - GENERAL: PAINLEVEL: SEVERE PAIN (6)

## 2021-01-21 ASSESSMENT — MIFFLIN-ST. JEOR: SCORE: 1674.89

## 2021-01-21 NOTE — PROGRESS NOTES
"  Assessment & Plan     RLQ abdominal pain  Go ahead with lab work on his way out will notify with results.  We will set him up for CT scan of the abdomen and pelvis.  He really appears in no distress.  Exam as noted was unremarkable.  We will follow-up with him after the lab work and CT scan results to see how he is doing.  - CBC with platelets differential  - *UA reflex to Microscopic and Culture ([place]; [facility]; [facility]; [facility]; [facility]; [facility]; [place]; [place])  - ESR: Erythrocyte sedimentation rate  - CT Abdomen Pelvis w Contrast; Future                       BMI:   Estimated body mass index is 27.48 kg/m  as calculated from the following:    Height as of this encounter: 1.778 m (5' 10\").    Weight as of this encounter: 86.9 kg (191 lb 8 oz).             No follow-ups on file.    Yoni Kong MD  [facility] KANDACE Danielle is a 60 year old who presents to clinic today for the following health issues  accompanied by himself:    HPI       Abdominal/Flank Pain  Onset/Duration: 2-3 weeks  Description:   Character: Sharp  Location: right lower quadrant  Radiation: None and Back  Intensity: moderate  Progression of Symptoms:  intermittent  Accompanying Signs & Symptoms:  Fever/Chills: no  Gas/Bloating: no  Nausea: no  Vomitting: no  Diarrhea: no  Constipation: no  Dysuria or Hematuria: no  History:   Trauma: no  Previous similar pain: no  Previous tests done: none  Precipitating factors:   Does the pain change with:     Food: no    Bowel Movement: no    Urination: no   Other factors:  no  Therapies tried and outcome: None          Review of Systems   Constitutional, HEENT, cardiovascular, pulmonary, gi and gu systems are negative, except as otherwise noted.      Objective    /78   Pulse 90   Temp 97.6  F (36.4  C) (Temporal)   Resp 16   Ht 1.778 m (5' 10\")   Wt 86.9 kg (191 lb 8 oz)   SpO2 96%   BMI 27.48 kg/m    Body mass " index is 27.48 kg/m .  Physical Exam   GENERAL: healthy, alert and no distress  EYES: Eyes grossly normal to inspection, PERRL and conjunctivae and sclerae normal  NECK: no adenopathy, no asymmetry, masses, or scars and thyroid normal to palpation  RESP: lungs clear to auscultation - no rales, rhonchi or wheezes  CV: regular rate and rhythm, normal S1 S2, no S3 or S4, no murmur, click or rub, no peripheral edema and peripheral pulses strong  ABDOMEN: soft, nontender, no hepatosplenomegaly, no masses and bowel sounds normal  SKIN: no suspicious lesions or rashes  PSYCH: mentation appears normal, affect normal/bright    Results for orders placed or performed in visit on 01/21/21 (from the past 24 hour(s))   CBC with platelets differential   Result Value Ref [place]    WBC 5.9 4.0 - 11.0 10e9/L    RBC Count 5.34 4.4 - 5.9 10e12/L    Hemoglobin 16.7 13.3 - 17.7 g/dL    Hematocrit 48.4 40.0 - 53.0 %    MCV 91 78 - 100 fl    MCH 31.3 26.5 - 33.0 pg    MCHC 34.5 31.5 - 36.5 g/dL    RDW 12.8 10.0 - 15.0 %    Platelet Count 161 150 - 450 10e9/L    Diff Method Automated Method     % Neutrophils 59.4 %    % Lymphocytes 26.1 %    % Monocytes 9.8 %    % Eosinophils 3.7 %    % Basophils 0.7 %    % Immature Granulocytes 0.3 %    Nucleated RBCs 0 0 /100    Absolute Neutrophil 3.5 1.6 - 8.3 10e9/L    Absolute Lymphocytes 1.6 0.8 - 5.3 10e9/L    Absolute Monocytes 0.6 0.0 - 1.3 10e9/L    Absolute Basophils 0.0 0.0 - 0.2 10e9/L    Abs Immature Granulocytes 0.0 0 - 0.4 10e9/L    Absolute Nucleated RBC 0.0    ESR: Erythrocyte sedimentation rate   Result Value Ref [place]    Sed Rate 3 0 - 20 mm/h   *UA reflex to Microscopic and Culture ([place]; [facility]; [facility]; [facility]; [facility]; [facility]; [place]; [place])    Specimen: Unspecified Urine   Result Value Ref [place]    Color Urine Yellow     Appearance Urine Clear     Glucose Urine Negative NEG^Negative mg/dL    Bilirubin Urine Negative NEG^Negative     Ketones Urine Negative NEG^Negative mg/dL    Specific Gravity Urine 1.013 1.003 - 1.035    Blood Urine Small (A) NEG^Negative    pH Urine 5.0 5.0 - 7.0 pH    Protein Albumin Urine Negative NEG^Negative mg/dL    Urobilinogen mg/dL 0.0 0.0 - 2.0 mg/dL    Nitrite Urine Negative NEG^Negative    Leukocyte Esterase Urine Negative NEG^Negative    Source Unspecified Urine     RBC Urine <1 0 - 2 /HPF    WBC Urine <1 0 - 5 /HPF    Mucous Urine Present (A) NEG^Negative /LPF

## 2021-01-26 ENCOUNTER — HOSPITAL ENCOUNTER (OUTPATIENT)
Dept: CT IMAGING | Facility: CLINIC | Age: 61
Discharge: HOME | End: 2021-01-26
Attending: FAMILY MEDICINE | Admitting: FAMILY MEDICINE
Payer: COMMERCIAL

## 2021-01-26 DIAGNOSIS — R10.31 RLQ ABDOMINAL PAIN: ICD-10-CM

## 2021-01-26 PROCEDURE — 250N000009 HC RX 250: Performed by: RADIOLOGY

## 2021-01-26 PROCEDURE — 74177 CT ABD & PELVIS W/CONTRAST: CPT

## 2021-01-26 PROCEDURE — 250N000011 HC RX IP 250 OP 636: Performed by: RADIOLOGY

## 2021-01-26 RX ORDER — IOPAMIDOL 755 MG/ML
500 INJECTION, SOLUTION INTRAVASCULAR ONCE
Status: COMPLETED | OUTPATIENT
Start: 2021-01-26 | End: 2021-01-26

## 2021-01-26 RX ADMIN — SODIUM CHLORIDE 60 ML: 9 INJECTION, SOLUTION INTRAVENOUS at 12:21

## 2021-01-26 RX ADMIN — IOPAMIDOL 95 ML: 755 INJECTION, SOLUTION INTRAVENOUS at 12:21

## 2021-01-27 ENCOUNTER — TELEPHONE (OUTPATIENT)
Dept: FAMILY MEDICINE | Facility: CLINIC | Age: 61
End: 2021-01-27

## 2021-01-27 NOTE — TELEPHONE ENCOUNTER
----- Message from Yoni Kong MD sent at 1/27/2021  1:02 PM CST -----  Your labs were normal and your CT scan shows no abnormalities or reasons for pain.

## 2021-01-27 NOTE — TELEPHONE ENCOUNTER
Patient was informed that his labs were normal and his CT scan shows no abnormalities or reasons for pain. Patient has an appointment on 1/29/2021, he will discuss at that time what Dr. Kong thinks he should do next. He states that he does still have pain.     Colleen Gramajo, [facility]

## 2021-01-29 ENCOUNTER — OFFICE VISIT (OUTPATIENT)
Dept: FAMILY MEDICINE | Facility: CLINIC | Age: 61
End: 2021-01-29
Payer: COMMERCIAL

## 2021-01-29 VITALS
SYSTOLIC BLOOD PRESSURE: 104 MMHG | WEIGHT: 194.8 LBS | HEART RATE: 75 BPM | DIASTOLIC BLOOD PRESSURE: 66 MMHG | RESPIRATION RATE: 20 BRPM | TEMPERATURE: 97.5 F | BODY MASS INDEX: 27.95 KG/M2

## 2021-01-29 DIAGNOSIS — E78.5 HYPERLIPIDEMIA LDL GOAL <130: ICD-10-CM

## 2021-01-29 DIAGNOSIS — R10.31 RLQ ABDOMINAL PAIN: Primary | ICD-10-CM

## 2021-01-29 DIAGNOSIS — Z12.5 SCREENING FOR PROSTATE CANCER: ICD-10-CM

## 2021-01-29 PROCEDURE — 99213 OFFICE O/P EST LOW 20 MIN: CPT | Performed by: FAMILY MEDICINE

## 2021-01-29 ASSESSMENT — PAIN SCALES - GENERAL: PAINLEVEL: MODERATE PAIN (4)

## 2021-01-29 NOTE — PROGRESS NOTES
Assessment & Plan     RLQ abdominal pain  Pain is somewhat off and on getting better.  Explained the CT results not really showing anything that could be causing his pain that would last 3 weeks.  He wonders if it is radiation from his back this could be.  Offered colonoscopy and possible surgical consult he wants to hold off    Hyperlipidemia LDL goal <130  Will be coming in in a month or so for physical wants to get the labs done ahead of time.  - Lipid panel reflex to direct LDL Fasting; Future  - **Comprehensive metabolic panel FUTURE anytime; Future    Screening for prostate cancer  We will get labs ahead of time for physical.  - **Prostate spec antigen screen FUTURE anytime; Future    Review of the result(s) of each unique test - CT scan of abdomen and labs                         No follow-ups on file.    Yoni Kong MD  [facility] KANDACE Danielle is a 60 year old who presents to clinic today for the following health issues     HPI       Questions regarding recent results.         Review of Systems   Constitutional, HEENT, cardiovascular, pulmonary, gi and gu systems are negative, except as otherwise noted.      Objective    /66   Pulse 75   Temp 97.5  F (36.4  C) (Temporal)   Resp 20   Wt 88.4 kg (194 lb 12.8 oz)   BMI 27.95 kg/m    Body mass index is 27.95 kg/m .  Physical Exam   GENERAL: healthy, alert and no distress  ABDOMEN: soft, nontender, no hepatosplenomegaly, no masses and bowel sounds normal  MS: no gross musculoskeletal defects noted, no edema  NEURO: Normal strength and tone, mentation intact and speech normal  PSYCH: mentation appears normal, affect normal/bright

## 2021-03-01 DIAGNOSIS — E78.5 HYPERLIPIDEMIA LDL GOAL <130: ICD-10-CM

## 2021-03-01 DIAGNOSIS — Z12.5 SCREENING FOR PROSTATE CANCER: ICD-10-CM

## 2021-03-01 LAB
ALBUMIN SERPL-MCNC: 3.9 G/DL (ref 3.4–5)
ALP SERPL-CCNC: 59 U/L (ref 40–150)
ALT SERPL W P-5'-P-CCNC: 27 U/L (ref 0–70)
ANION GAP SERPL CALCULATED.3IONS-SCNC: 4 MMOL/L (ref 3–14)
AST SERPL W P-5'-P-CCNC: 20 U/L (ref 0–45)
BILIRUB SERPL-MCNC: 0.6 MG/DL (ref 0.2–1.3)
BUN SERPL-MCNC: 15 MG/DL (ref 7–30)
CALCIUM SERPL-MCNC: 9.3 MG/DL (ref 8.5–10.1)
CHLORIDE SERPL-SCNC: 110 MMOL/L (ref 94–109)
CHOLEST SERPL-MCNC: 170 MG/DL
CO2 SERPL-SCNC: 27 MMOL/L (ref 20–32)
CREAT SERPL-MCNC: 0.96 MG/DL (ref 0.66–1.25)
GFR SERPL CREATININE-BSD FRML MDRD: 85 ML/MIN/{1.73_M2}
GLUCOSE SERPL-MCNC: 107 MG/DL (ref 70–99)
HDLC SERPL-MCNC: 58 MG/DL
LDLC SERPL CALC-MCNC: 76 MG/DL
NONHDLC SERPL-MCNC: 112 MG/DL
POTASSIUM SERPL-SCNC: 4.1 MMOL/L (ref 3.4–5.3)
PROT SERPL-MCNC: 7.2 G/DL (ref 6.8–8.8)
PSA SERPL-ACNC: 1.9 UG/L (ref 0–4)
SODIUM SERPL-SCNC: 141 MMOL/L (ref 133–144)
TRIGL SERPL-MCNC: 180 MG/DL

## 2021-03-01 PROCEDURE — 36415 COLL VENOUS BLD VENIPUNCTURE: CPT | Performed by: FAMILY MEDICINE

## 2021-03-01 PROCEDURE — 80061 LIPID PANEL: CPT | Performed by: FAMILY MEDICINE

## 2021-03-01 PROCEDURE — G0103 PSA SCREENING: HCPCS | Performed by: FAMILY MEDICINE

## 2021-03-01 PROCEDURE — 80053 COMPREHEN METABOLIC PANEL: CPT | Performed by: FAMILY MEDICINE

## 2021-03-04 ENCOUNTER — OFFICE VISIT (OUTPATIENT)
Dept: FAMILY MEDICINE | Facility: CLINIC | Age: 61
End: 2021-03-04
Payer: COMMERCIAL

## 2021-03-04 VITALS
BODY MASS INDEX: 27.86 KG/M2 | DIASTOLIC BLOOD PRESSURE: 76 MMHG | WEIGHT: 194.6 LBS | HEART RATE: 84 BPM | HEIGHT: 70 IN | TEMPERATURE: 97.5 F | SYSTOLIC BLOOD PRESSURE: 112 MMHG | OXYGEN SATURATION: 96 % | RESPIRATION RATE: 16 BRPM

## 2021-03-04 DIAGNOSIS — E78.5 HYPERLIPIDEMIA LDL GOAL <130: ICD-10-CM

## 2021-03-04 DIAGNOSIS — Z00.00 ENCOUNTER FOR MEDICARE ANNUAL WELLNESS EXAM: Primary | ICD-10-CM

## 2021-03-04 PROCEDURE — G0438 PPPS, INITIAL VISIT: HCPCS | Performed by: FAMILY MEDICINE

## 2021-03-04 RX ORDER — ATORVASTATIN CALCIUM 20 MG/1
20 TABLET, FILM COATED ORAL DAILY
Qty: 90 TABLET | Refills: 3 | Status: SHIPPED | OUTPATIENT
Start: 2021-03-04 | End: 2022-03-10

## 2021-03-04 ASSESSMENT — MIFFLIN-ST. JEOR: SCORE: 1688.95

## 2021-03-04 ASSESSMENT — PAIN SCALES - GENERAL: PAINLEVEL: NO PAIN (0)

## 2021-03-04 NOTE — PROGRESS NOTES
SUBJECTIVE:   Shashi Raza is a 60 year old male who presents for Preventive Visit.    Patient has been advised of split billing requirements and indicates understanding: No   Are you in the first 12 months of your Medicare coverage?  Yes,  Visual Acuity:  Right Eye: 20/30   Left Eye: 20/30  Both Eyes: 20/30 with his glasses and bifocals    HPI  Do you feel safe in your environment? Yes    Have you ever done Advance Care Planning? (For example, a Health Directive, POLST, or a discussion with a medical provider or your loved ones about your wishes): he may have at home with his wife      Fall risk  Fallen 2 or more times in the past year?: No  Any fall with injury in the past year?: No    Cognitive Screening   1) Repeat 3 items (Leader, Season, Table)    2) Clock draw: NORMAL  3) 3 item recall: Recalls 3 objects  Results: NORMAL clock, 1-2 items recalled: COGNITIVE IMPAIRMENT LESS LIKELY    Mini-CogTM Copyright AMBER Parrish. Licensed by the author for use in [facility]; reprinted with permission (soob@[facility]). All rights reserved.      Do you have sleep apnea, excessive snoring or daytime drowsiness?: no    Reviewed and updated as needed this visit by clinical staff  Tobacco  Allergies  Meds      Soc Hx        Reviewed and updated as needed this visit by Provider                Social History     Tobacco Use     Smoking status: Never Smoker     Smokeless tobacco: Never Used   Substance Use Topics     Alcohol use: Yes     Alcohol/week: 0.0 standard drinks     Comment: occasional     If you drink alcohol do you typically have >3 drinks per day or >7 drinks per week? No    Alcohol Use 3/2/2020   Prescreen: >3 drinks/day or >7 drinks/week? No   Prescreen: >3 drinks/day or >7 drinks/week? -   No flowsheet data found.      Current providers sharing in care for this patient include:   Patient Care Team:  Yoni Kong MD as PCP - General  Yoni Kong MD as Assigned PCP  Akash Austin MD as  "Assigned Surgical Provider    The following health maintenance items are reviewed in Epic and correct as of today:  Health Maintenance   Topic Date Due     ADVANCE CARE PLANNING  1960     HIV SCREENING  12/27/1973     HEPATITIS C SCREENING  12/27/1976     ZOSTER IMMUNIZATION (1 of 2) 12/27/2008     INFLUENZA VACCINE (1) 09/01/2020     MEDICARE ANNUAL WELLNESS VISIT  03/02/2021     DTAP/TDAP/TD IMMUNIZATION (2 - Td) 01/26/2022     LIPID  03/01/2022     COLORECTAL CANCER SCREENING  03/31/2025     PHQ-2  Completed     Pneumococcal Vaccine: Pediatrics (0 to 5 Years) and At-Risk Patients (6 to 64 Years)  Aged Out     IPV IMMUNIZATION  Aged Out     MENINGITIS IMMUNIZATION  Aged Out     HEPATITIS B IMMUNIZATION  Aged Out           Review of Systems  Constitutional, HEENT, cardiovascular, pulmonary, GI, , musculoskeletal, neuro, skin, endocrine and psych systems are negative, except as otherwise noted.    OBJECTIVE:   /76   Pulse 84   Temp 97.5  F (36.4  C) (Temporal)   Resp 16   Ht 1.778 m (5' 10\")   Wt 88.3 kg (194 lb 9.6 oz)   SpO2 96%   BMI 27.92 kg/m   Estimated body mass index is 27.92 kg/m  as calculated from the following:    Height as of this encounter: 1.778 m (5' 10\").    Weight as of this encounter: 88.3 kg (194 lb 9.6 oz).  Physical Exam  GENERAL: healthy, alert and no distress  EYES: Eyes grossly normal to inspection, PERRL and conjunctivae and sclerae normal  HENT: ear canals and TM's normal, nose and mouth without ulcers or lesions  NECK: no adenopathy, no asymmetry, masses, or scars and thyroid normal to palpation  RESP: lungs clear to auscultation - no rales, rhonchi or wheezes  CV: regular rate and rhythm, normal S1 S2, no S3 or S4, no murmur, click or rub, no peripheral edema and peripheral pulses strong  ABDOMEN: soft, nontender, no hepatosplenomegaly, no masses and bowel sounds normal  MS: no gross musculoskeletal defects noted, no edema  SKIN: no suspicious lesions or " "rashes  NEURO: Normal strength and tone, mentation intact and speech normal  PSYCH: mentation appears normal, affect normal/bright    Diagnostic Test Results:  Labs reviewed in Epic    ASSESSMENT / PLAN:   1. Encounter for Medicare annual wellness exam  Stable.  Was worked up for some abdominal pain.    2. Hyperlipidemia LDL goal <130  Renewed for another year  - atorvastatin (LIPITOR) 20 MG tablet; Take 1 tablet (20 mg) by mouth daily  Dispense: 90 tablet; Refill: 3    Patient has been advised of split billing requirements and indicates understanding: Yes  COUNSELING:  Reviewed preventive health counseling, as reflected in patient instructions       Regular exercise       Healthy diet/nutrition       Vision screening    Estimated body mass index is 27.92 kg/m  as calculated from the following:    Height as of this encounter: 1.778 m (5' 10\").    Weight as of this encounter: 88.3 kg (194 lb 9.6 oz).        He reports that he has never smoked. He has never used smokeless tobacco.      Appropriate preventive services were discussed with this patient, including applicable screening as appropriate for cardiovascular disease, diabetes, osteopenia/osteoporosis, and glaucoma.  As appropriate for age/gender, discussed screening for colorectal cancer, prostate cancer, breast cancer, and cervical cancer. Checklist reviewing preventive services available has been given to the patient.    Reviewed patients plan of care and provided an AVS. The Basic Care Plan (routine screening as documented in Health Maintenance) for Shashi meets the Care Plan requirement. This Care Plan has been established and reviewed with the Patient.    Counseling Resources:  ATP IV Guidelines  Pooled Cohorts Equation Calculator  Breast Cancer Risk Calculator  Breast Cancer: Medication to Reduce Risk  FRAX Risk Assessment  ICSI Preventive Guidelines  Dietary Guidelines for Americans, 2010  USDA's MyPlate  ASA Prophylaxis  Lung CA Screening    Yoni HALE" MD [place]  [facility]    Identified Health Risks:

## 2021-03-04 NOTE — NURSING NOTE
Health Maintenance Due   Topic Date Due     ADVANCE CARE PLANNING  1960     HIV SCREENING  12/27/1973     HEPATITIS C SCREENING  12/27/1976     ZOSTER IMMUNIZATION (1 of 2) 12/27/2008     INFLUENZA VACCINE (1) 09/01/2020     MEDICARE ANNUAL WELLNESS VISIT  03/02/2021     Health Maintenance reviewed at today's visit patient asked to schedule/complete:   Immunizations:  Patient declined     Colleen Gramajo, CMA

## 2021-03-04 NOTE — PATIENT INSTRUCTIONS
Patient Education   Personalized Prevention Plan  You are due for the preventive services outlined below.  Your care team is available to assist you in scheduling these services.  If you have already completed any of these items, please share that information with your care team to update in your medical record.  Health Maintenance Due   Topic Date Due     Discuss Advance Care Planning  1960     HIV Screening  12/27/1973     Hepatitis C Screening  12/27/1976     Zoster (Shingles) Vaccine (1 of 2) 12/27/2008     Flu Vaccine (1) 09/01/2020

## 2021-03-05 DIAGNOSIS — E78.5 HYPERLIPIDEMIA LDL GOAL <130: ICD-10-CM

## 2021-03-08 RX ORDER — ATORVASTATIN CALCIUM 20 MG/1
TABLET, FILM COATED ORAL
Qty: 90 TABLET | Refills: 3 | OUTPATIENT
Start: 2021-03-08

## 2021-03-08 NOTE — TELEPHONE ENCOUNTER
Prescription was sent 3/4/2021 for #90 with 3 refills.  Pharmacy notified via E-Prescribe refusal.     SAV OmalleyN, RN  [facility]

## 2021-03-13 ENCOUNTER — HEALTH MAINTENANCE LETTER (OUTPATIENT)
Age: 61
End: 2021-03-13

## 2021-04-13 DIAGNOSIS — M48.062 SPINAL STENOSIS OF LUMBAR REGION WITH NEUROGENIC CLAUDICATION: ICD-10-CM

## 2021-04-13 NOTE — TELEPHONE ENCOUNTER
Requested Prescriptions   Pending Prescriptions Disp Refills     predniSONE (DELTASONE) 20 MG tablet [Pharmacy Med Name: PREDNISONE 20MG TABS] 18 tablet 1     Sig: TAKE THREE TABLETS BY MOUTH ONCE DAILY FOR 3 DAYS THEN TAKE TWO TABLETS ONCE DAILY FOR 3 DAYS THEN TAKE ONE TABLET ONCE DAILY FOR 3 DAYS     Last Written Prescription Date:  01/04/2021  Last Fill Quantity: 18,   # refills: 1  Last Office Visit: 03/04/2021  Future Office visit:       Routing refill request to provider for review/approval because:  Drug not on the [facility], P or  Health refill protocol or controlled substance              gabapentin (NEURONTIN) 300 MG capsule [Pharmacy Med Name: GABAPENTIN 300MG CAPS] 90 capsule 1     Sig: TAKE ONE CAPSULE BY MOUTH THREE TIMES A DAY     Last Written Prescription Date:  01/14/2021  Last Fill Quantity: 90,   # refills: 1  Last Office Visit: 03/04/2021  Future Office visit:       Routing refill request to provider for review/approval because:  Drug not on the G, P or  Health refill protocol or controlled substance    Sharita Smith MA

## 2021-04-14 RX ORDER — PREDNISONE 20 MG/1
TABLET ORAL
Qty: 18 TABLET | Refills: 1 | Status: SHIPPED | OUTPATIENT
Start: 2021-04-14 | End: 2021-10-15

## 2021-04-14 RX ORDER — GABAPENTIN 300 MG/1
CAPSULE ORAL
Qty: 90 CAPSULE | Refills: 1 | Status: SHIPPED | OUTPATIENT
Start: 2021-04-14 | End: 2022-01-04

## 2021-04-19 ENCOUNTER — TELEPHONE (OUTPATIENT)
Dept: FAMILY MEDICINE | Facility: CLINIC | Age: 61
End: 2021-04-19

## 2021-04-19 NOTE — TELEPHONE ENCOUNTER
Reason for Call:  Form, our goal is to have forms completed with 72 hours, however, some forms may require a visit or additional information.    Type of letter, form or note:  disability    Who is the form from?: Patient    Where did the form come from: Patient or family brought in       What clinic location was the form placed at?: [facility]    Where the form was placed: Dr. Kong Box/Folder    What number is listed as a contact on the form?: [phone]       Additional comments:     Call taken on 4/19/2021 at 12:39 PM by Jen Mcguire

## 2021-04-21 NOTE — TELEPHONE ENCOUNTER
Paperwork was placed in the mail for patient. Copy is scanned in patient's chart.    Colleen Gramajo CMA

## 2021-06-07 ENCOUNTER — HOSPITAL ENCOUNTER (OUTPATIENT)
Dept: GENERAL RADIOLOGY | Facility: CLINIC | Age: 61
Discharge: HOME | End: 2021-06-07
Attending: NURSE PRACTITIONER | Admitting: NURSE PRACTITIONER
Payer: COMMERCIAL

## 2021-06-07 ENCOUNTER — OFFICE VISIT (OUTPATIENT)
Dept: FAMILY MEDICINE | Facility: CLINIC | Age: 61
End: 2021-06-07
Payer: COMMERCIAL

## 2021-06-07 ENCOUNTER — TELEPHONE (OUTPATIENT)
Dept: FAMILY MEDICINE | Facility: CLINIC | Age: 61
End: 2021-06-07

## 2021-06-07 VITALS
HEART RATE: 74 BPM | RESPIRATION RATE: 20 BRPM | DIASTOLIC BLOOD PRESSURE: 80 MMHG | TEMPERATURE: 97 F | OXYGEN SATURATION: 99 % | BODY MASS INDEX: 28.46 KG/M2 | WEIGHT: 198.38 LBS | SYSTOLIC BLOOD PRESSURE: 122 MMHG

## 2021-06-07 DIAGNOSIS — Z00.00 HEALTHCARE MAINTENANCE: ICD-10-CM

## 2021-06-07 DIAGNOSIS — G89.29 CHRONIC LEFT SHOULDER PAIN: Primary | ICD-10-CM

## 2021-06-07 DIAGNOSIS — G89.29 CHRONIC LEFT SHOULDER PAIN: ICD-10-CM

## 2021-06-07 DIAGNOSIS — M25.512 CHRONIC LEFT SHOULDER PAIN: Primary | ICD-10-CM

## 2021-06-07 DIAGNOSIS — M25.512 CHRONIC LEFT SHOULDER PAIN: ICD-10-CM

## 2021-06-07 PROCEDURE — 73030 X-RAY EXAM OF SHOULDER: CPT | Mod: LT

## 2021-06-07 PROCEDURE — 99214 OFFICE O/P EST MOD 30 MIN: CPT | Performed by: NURSE PRACTITIONER

## 2021-06-07 ASSESSMENT — PAIN SCALES - GENERAL: PAINLEVEL: MILD PAIN (3)

## 2021-06-07 NOTE — PROGRESS NOTES
Assessment & Plan     Chronic left shoulder pain  - Pain in both shoulders chronically. Now the left shoulder getting worse over the last 6 months. Concerned he is developing frozen shoulder.   - Limitations with abduction and pain with lift off test.   - Plan for x rays now as we have no imaging, concern for subacromial impingement.   - Referral for orthopedics for evaluations and treatment recommendations.   - XR Shoulder Left 2 Views; Future  - Orthopedic & Spine  Referral; Future    Healthcare maintenance  - Reviewed he is due for next physical this up coming March, and he will consider the Zoster immunizations in the morning.   - REVIEW OF HEALTH MAINTENANCE PROTOCOL ORDERS    Review of prior external note(s) from - reviewed imaging.   30  minutes spent on the date of the encounter doing chart review, history and exam, documentation and further activities per the note           Return in about 2 weeks (around 6/21/2021), or if symptoms worsen or fail to improve.    Harshad Saavedra NP  [facility] KANDACE Danielle is a 60 year old who presents for the following health issues     HPI     Musculoskeletal problem/pain  Onset/Duration: 6 months  Description  Location: Shoulder - left  Joint Swelling: no  Redness: no  Pain: YES  Warmth: no  Intensity:  moderate  Progression of Symptoms:  worsening  Accompanying signs and symptoms:   Fevers: no  Numbness/tingling/weakness: not applicable  History  Trauma to the area: no  Recent illness:  no  Previous similar problem: YES, Frozen shoulder in 2002  Previous evaluation:  YES  Precipitating or alleviating factors:  Aggravating factors include: sitting in a car, raising arms up.   Therapies tried and outcome: Taking gabapentin, tylenol, ibuprofen when needed.     He is worried about frozen shoulder. The left shoulder has gotten progressively worse over the last 6 months. No difficulty raising over his head or bringing up past  his back pocket. Pain is not constant but notices after use.     Review of Systems   Constitutional, HEENT, cardiovascular, pulmonary, gi and gu systems are negative, except as otherwise noted.      Objective    /80   Pulse 74   Temp 97  F (36.1  C) (Temporal)   Resp 20   Wt 90 kg (198 lb 6 oz)   SpO2 99%   BMI 28.46 kg/m    Body mass index is 28.46 kg/m .  Physical Exam   GENERAL: alert and no distress  RESP: Normal effort  ABDOMEN: soft, nontender  MS: no gross musculoskeletal defects noted, no edema  MS: LUE exam shows no deformities but decreased range of motion with painful arc, unable to lift arm past 90 degree's and then with subacromial pain. Unable to perform lift off test without pain.   SKIN: no suspicious lesions or rashes  NEURO: sensory exam grossly normal and mentation intact  PSYCH: mentation appears normal and judgement and insight intact    Reviewed what imaging we had.

## 2021-06-07 NOTE — PATIENT INSTRUCTIONS
- X ray today. I will call you with results.     - I have placed an order for orthopedics to review xray, discuss the chronic shoulder pain and evaluation and treatment moving forward.     Call sooner with new or concerning symptoms prior to your next follow up appointment.     Harshad Saavedra CNP

## 2021-06-07 NOTE — TELEPHONE ENCOUNTER
Spoke with patient and informed of results below. Patient understood. No further questions or concerns at this time.   Sharita Smith MA

## 2021-06-07 NOTE — TELEPHONE ENCOUNTER
----- Message from Harshad Saavedra NP sent at 6/7/2021 12:56 PM CDT -----  Please call and patient and let him know xray shows mild arthritis no fractures. He should follow with Orthopedics as we discussed.     Harshad Saavedra CNP

## 2021-06-11 ENCOUNTER — OFFICE VISIT (OUTPATIENT)
Dept: ORTHOPEDICS | Facility: CLINIC | Age: 61
End: 2021-06-11
Attending: NURSE PRACTITIONER
Payer: COMMERCIAL

## 2021-06-11 ENCOUNTER — OFFICE VISIT (OUTPATIENT)
Dept: PALLIATIVE MEDICINE | Facility: CLINIC | Age: 61
End: 2021-06-11
Attending: ORTHOPAEDIC SURGERY
Payer: COMMERCIAL

## 2021-06-11 VITALS
SYSTOLIC BLOOD PRESSURE: 113 MMHG | HEIGHT: 70 IN | DIASTOLIC BLOOD PRESSURE: 70 MMHG | BODY MASS INDEX: 28.35 KG/M2 | WEIGHT: 198 LBS | TEMPERATURE: 98 F

## 2021-06-11 VITALS
SYSTOLIC BLOOD PRESSURE: 113 MMHG | HEIGHT: 70 IN | BODY MASS INDEX: 28.35 KG/M2 | WEIGHT: 198 LBS | DIASTOLIC BLOOD PRESSURE: 70 MMHG

## 2021-06-11 DIAGNOSIS — G89.29 CHRONIC LEFT SHOULDER PAIN: ICD-10-CM

## 2021-06-11 DIAGNOSIS — M25.512 CHRONIC LEFT SHOULDER PAIN: ICD-10-CM

## 2021-06-11 DIAGNOSIS — M75.42 IMPINGEMENT SYNDROME OF LEFT SHOULDER: Primary | ICD-10-CM

## 2021-06-11 DIAGNOSIS — M79.18 MYOFASCIAL PAIN: Primary | ICD-10-CM

## 2021-06-11 PROCEDURE — 20610 DRAIN/INJ JOINT/BURSA W/O US: CPT | Mod: LT | Performed by: ORTHOPAEDIC SURGERY

## 2021-06-11 PROCEDURE — 20552 NJX 1/MLT TRIGGER POINT 1/2: CPT | Performed by: PHYSICIAN ASSISTANT

## 2021-06-11 PROCEDURE — 99213 OFFICE O/P EST LOW 20 MIN: CPT | Mod: 25 | Performed by: ORTHOPAEDIC SURGERY

## 2021-06-11 RX ORDER — BUPIVACAINE HYDROCHLORIDE 5 MG/ML
3 INJECTION, SOLUTION PERINEURAL ONCE
Status: COMPLETED | OUTPATIENT
Start: 2021-06-11 | End: 2021-06-11

## 2021-06-11 RX ORDER — BUPIVACAINE HYDROCHLORIDE 5 MG/ML
3 INJECTION, SOLUTION EPIDURAL; INTRACAUDAL
Status: DISCONTINUED | OUTPATIENT
Start: 2021-06-11 | End: 2023-03-13

## 2021-06-11 RX ORDER — TRIAMCINOLONE ACETONIDE 40 MG/ML
80 INJECTION, SUSPENSION INTRA-ARTICULAR; INTRAMUSCULAR
Status: DISCONTINUED | OUTPATIENT
Start: 2021-06-11 | End: 2023-03-13

## 2021-06-11 RX ADMIN — TRIAMCINOLONE ACETONIDE 80 MG: 40 INJECTION, SUSPENSION INTRA-ARTICULAR; INTRAMUSCULAR at 10:35

## 2021-06-11 RX ADMIN — BUPIVACAINE HYDROCHLORIDE 3 ML: 5 INJECTION, SOLUTION EPIDURAL; INTRACAUDAL at 10:35

## 2021-06-11 RX ADMIN — BUPIVACAINE HYDROCHLORIDE 15 MG: 5 INJECTION, SOLUTION PERINEURAL at 10:59

## 2021-06-11 ASSESSMENT — MIFFLIN-ST. JEOR
SCORE: 1704.37
SCORE: 1704.37

## 2021-06-11 ASSESSMENT — PAIN SCALES - GENERAL: PAINLEVEL: MODERATE PAIN (5)

## 2021-06-11 NOTE — PROGRESS NOTES
Pre Procedure Diagnosis: myofascial pain  Post procedure Diagnosis: Same  Procedure performed: trigger point injections  Anesthesia: none  Complications: none  Operators: Parisa Luis PA-C     Indications:   Shashi Raza is a 60 year old male with a history of neck pain.  Exam shows myofascial pain of the muscle groups listed below and they have tried conservative treatment including medications.    Options/alternatives, benefits and risks were discussed with the patient including bleeding, infection, tissue trauma and pnuemothorax.  Questions were answered to his satisfaction and he agrees to proceed. Voluntary informed consent was obtained and signed.     Vitals were reviewed: Yes  Allergies were reviewed:  Yes   Medications were reviewed:  Yes   Pre-procedure pain score: 5/10    Procedure:  After getting informed consent, a Pause for the Cause was performed.    Trigger points were identified by patient, and marked when appropriate.  The area was prepped with Chloroprep.    Using clean technique, injections were completed using a 25G, 1.5 inch needle.  After negative aspiration, injection was completed.  A total of 5 locations were injected.  When possible, tissue was retracted from the chest wall to avoid lung injury.    Muscle groups injected:  Left trapezius    Injection solution contained:  3ml of 1% lidocaine and 3ml of 0.5% bupivacaine.    Hemostasis was achieved, the area was cleaned, and bandaids were placed when appropriate.  The patient tolerated the procedure well.  No shortness of breath with conversation     Post-procedure pain score: 5/10  Follow-up includes:   -repeat every 6 weeks prn  -F/u with referring provider      Parisa Luis [facility]
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
## 2021-06-11 NOTE — PATIENT INSTRUCTIONS
[facility]   Post Procedure Instructions    Today you had:  trigger point injections     Medications used:  lidocaine   bupivicaine             Go to the emergency room if you develop any shortness of breath    Monitor the injection sites for signs and symptoms of infection-fever, chills, redness, swelling, warmth, or drainage to areas.    You may have soreness at injection sites for up to 24 hours.    You may apply ice to the painful areas to help minimize the discomfort of the needle pokes.    Do not apply heat to sites for at least 12 hours.    You may use anti-inflammatory medications or Tylenol for pain control if necessary    Pain Clinic phone number during work hours Monday-Friday:  [phone]    After hours provider line: [phone]

## 2021-10-15 DIAGNOSIS — M48.062 SPINAL STENOSIS OF LUMBAR REGION WITH NEUROGENIC CLAUDICATION: ICD-10-CM

## 2021-10-15 RX ORDER — PREDNISONE 20 MG/1
TABLET ORAL
Qty: 18 TABLET | Refills: 1 | Status: SHIPPED | OUTPATIENT
Start: 2021-10-15 | End: 2022-03-25

## 2021-10-15 NOTE — TELEPHONE ENCOUNTER
Prednisone  Routing refill request to provider for review/approval because:  Drug not on the [facility] refill protocol     Lauren Segovia RN

## 2021-10-23 ENCOUNTER — HEALTH MAINTENANCE LETTER (OUTPATIENT)
Age: 61
End: 2021-10-23

## 2022-01-03 DIAGNOSIS — G63 POLYNEUROPATHY IN OTHER DISEASES CLASSIFIED ELSEWHERE (H): ICD-10-CM

## 2022-01-03 DIAGNOSIS — M48.062 SPINAL STENOSIS OF LUMBAR REGION WITH NEUROGENIC CLAUDICATION: ICD-10-CM

## 2022-01-04 RX ORDER — GABAPENTIN 300 MG/1
CAPSULE ORAL
Qty: 90 CAPSULE | Refills: 1 | Status: SHIPPED | OUTPATIENT
Start: 2022-01-04 | End: 2022-03-25

## 2022-01-04 RX ORDER — METHOCARBAMOL 500 MG/1
TABLET, FILM COATED ORAL
Qty: 90 TABLET | Refills: 3 | Status: SHIPPED | OUTPATIENT
Start: 2022-01-04 | End: 2022-08-25

## 2022-01-04 NOTE — TELEPHONE ENCOUNTER
Gabapentin      Last Written Prescription Date:  4/14/2021  Last Fill Quantity: 90,   # refills: 1  Last Office Visit: 6/7/2021  Future Office visit:       Routing refill request to provider for review/approval because:  Drug not on the [facility], [facility] or [facility] refill protocol or controlled substance    Robaxin      Last Written Prescription Date:  10/21/2020  Last Fill Quantity: 90,   # refills: 3  Last Office Visit: 6/7/2021  Future Office visit:       Routing refill request to provider for review/approval because:  Drug not on the [facility], [facility] or [facility] refill protocol or controlled substance

## 2022-02-15 ENCOUNTER — IMMUNIZATION (OUTPATIENT)
Dept: FAMILY MEDICINE | Facility: CLINIC | Age: 62
End: 2022-02-15
Payer: COMMERCIAL

## 2022-02-15 PROCEDURE — 0054A COVID-19,PF,PFIZER (12+ YRS): CPT

## 2022-02-15 PROCEDURE — 91305 COVID-19,PF,PFIZER (12+ YRS): CPT

## 2022-02-16 ENCOUNTER — OFFICE VISIT (OUTPATIENT)
Dept: FAMILY MEDICINE | Facility: CLINIC | Age: 62
End: 2022-02-16
Payer: COMMERCIAL

## 2022-02-16 VITALS
HEART RATE: 90 BPM | RESPIRATION RATE: 16 BRPM | BODY MASS INDEX: 28.98 KG/M2 | TEMPERATURE: 97.9 F | OXYGEN SATURATION: 97 % | WEIGHT: 202 LBS | SYSTOLIC BLOOD PRESSURE: 118 MMHG | DIASTOLIC BLOOD PRESSURE: 66 MMHG

## 2022-02-16 DIAGNOSIS — E78.5 HYPERLIPIDEMIA LDL GOAL <130: ICD-10-CM

## 2022-02-16 DIAGNOSIS — Z12.5 SCREENING FOR PROSTATE CANCER: ICD-10-CM

## 2022-02-16 DIAGNOSIS — M48.062 SPINAL STENOSIS OF LUMBAR REGION WITH NEUROGENIC CLAUDICATION: Primary | ICD-10-CM

## 2022-02-16 PROCEDURE — 99214 OFFICE O/P EST MOD 30 MIN: CPT | Performed by: FAMILY MEDICINE

## 2022-02-16 ASSESSMENT — PAIN SCALES - GENERAL: PAINLEVEL: MILD PAIN (2)

## 2022-02-16 NOTE — PROGRESS NOTES
"  Assessment & Plan     Spinal stenosis of lumbar region with neurogenic claudication  Chronic neck and lumbar issues with him.  Feels his whole right side is feeling more numb.  Discussed this and were going to change his Neurontin to regular use notches as needed which is what he has been doing.  Complains of more pain in his right hip.  Reviewed x-rays from 2 years ago and showed no degenerative changes.  His lumbar spine is bad he has had multiple surgeries.  We will have him referred to neurosurgery to see if the pain is coming from there.  - Neurosurgery Referral; Future    Hyperlipidemia LDL goal <130  Future orders were put in for lab work he will be coming in for a physical next month.  We will discuss results then.  Renewed his medication for now.  - Lipid panel reflex to direct LDL Fasting; Future  - **Comprehensive metabolic panel FUTURE 2mo; Future    Screening for prostate cancer  Future orders were put in to discuss at his physical next month.  - PSA, screen; Future             BMI:   Estimated body mass index is 28.98 kg/m  as calculated from the following:    Height as of 6/11/21: 1.778 m (5' 10\").    Weight as of this encounter: 91.6 kg (202 lb).           No follow-ups on file.    Yoni Kong MD  [facility] KANDACE Danielle is a 61 year old who presents for the following health issues : Multiple problems with chronic pain chronic cervical spine issues and lumbar spine issues including surgeries.  Having more issues with his right hip and pain into his groin.    HPI     Chief Complaint   Patient presents with     Hip Pain     follow up on hip pain           Review of Systems   Constitutional, HEENT, cardiovascular, pulmonary, gi and gu systems are negative, except as otherwise noted.      Objective    /66   Pulse 90   Temp 97.9  F (36.6  C) (Temporal)   Resp 16   Wt 91.6 kg (202 lb)   SpO2 97%   BMI 28.98 kg/m    Body mass index is 28.98 " kg/m .  Physical Exam   GENERAL: healthy, alert and no distress  EYES: Eyes grossly normal to inspection, PERRL and conjunctivae and sclerae normal  NECK: no adenopathy, no asymmetry, masses, or scars and thyroid normal to palpation  RESP: lungs clear to auscultation - no rales, rhonchi or wheezes  MS: no gross musculoskeletal defects noted, no edema  SKIN: no suspicious lesions or rashes  NEURO: Normal strength and tone, mentation intact and speech normal  PSYCH: mentation appears normal, affect normal/bright

## 2022-02-21 ENCOUNTER — OFFICE VISIT (OUTPATIENT)
Dept: NEUROSURGERY | Facility: CLINIC | Age: 62
End: 2022-02-21
Attending: FAMILY MEDICINE
Payer: COMMERCIAL

## 2022-02-21 VITALS
DIASTOLIC BLOOD PRESSURE: 70 MMHG | WEIGHT: 202 LBS | SYSTOLIC BLOOD PRESSURE: 126 MMHG | HEIGHT: 70 IN | BODY MASS INDEX: 28.92 KG/M2 | TEMPERATURE: 98 F

## 2022-02-21 DIAGNOSIS — M25.552 BILATERAL HIP PAIN: Primary | ICD-10-CM

## 2022-02-21 DIAGNOSIS — M48.062 SPINAL STENOSIS OF LUMBAR REGION WITH NEUROGENIC CLAUDICATION: ICD-10-CM

## 2022-02-21 DIAGNOSIS — M25.551 BILATERAL HIP PAIN: Primary | ICD-10-CM

## 2022-02-21 PROCEDURE — 99214 OFFICE O/P EST MOD 30 MIN: CPT | Performed by: PHYSICIAN ASSISTANT

## 2022-02-21 ASSESSMENT — PAIN SCALES - GENERAL: PAINLEVEL: MILD PAIN (3)
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
## 2022-02-21 NOTE — PROGRESS NOTES
"Shashi Raza is a 61 year old male who presents for:  Chief Complaint   Patient presents with     Neurologic Problem     Lumbar: Spinal Stenosis        Initial Vitals:  /70 (BP Location: Right arm, Patient Position: Sitting, Cuff Size: Adult Regular)   Temp 98  F (36.7  C) (Temporal)   Ht 5' 10\" (1.778 m)   Wt 202 lb (91.6 kg)   BMI 28.98 kg/m   Estimated body mass index is 28.98 kg/m  as calculated from the following:    Height as of this encounter: 5' 10\" (1.778 m).    Weight as of this encounter: 202 lb (91.6 kg).. Body surface area is 2.13 meters squared. BP completed using cuff size: regular  Mild Pain (3)    Nursing Comments:     Shahana Henley    "

## 2022-03-08 ENCOUNTER — LAB (OUTPATIENT)
Dept: LAB | Facility: CLINIC | Age: 62
End: 2022-03-08
Payer: COMMERCIAL

## 2022-03-08 ENCOUNTER — ANCILLARY PROCEDURE (OUTPATIENT)
Dept: GENERAL RADIOLOGY | Facility: CLINIC | Age: 62
End: 2022-03-08
Attending: ORTHOPAEDIC SURGERY
Payer: COMMERCIAL

## 2022-03-08 ENCOUNTER — OFFICE VISIT (OUTPATIENT)
Dept: ORTHOPEDICS | Facility: CLINIC | Age: 62
End: 2022-03-08
Attending: PHYSICIAN ASSISTANT
Payer: COMMERCIAL

## 2022-03-08 VITALS
SYSTOLIC BLOOD PRESSURE: 111 MMHG | HEIGHT: 70 IN | DIASTOLIC BLOOD PRESSURE: 75 MMHG | BODY MASS INDEX: 28.63 KG/M2 | RESPIRATION RATE: 18 BRPM | HEART RATE: 81 BPM | WEIGHT: 200 LBS

## 2022-03-08 DIAGNOSIS — M25.552 BILATERAL HIP PAIN: ICD-10-CM

## 2022-03-08 DIAGNOSIS — Z12.5 SCREENING FOR PROSTATE CANCER: ICD-10-CM

## 2022-03-08 DIAGNOSIS — E78.5 HYPERLIPIDEMIA LDL GOAL <130: ICD-10-CM

## 2022-03-08 DIAGNOSIS — M25.551 BILATERAL HIP PAIN: ICD-10-CM

## 2022-03-08 DIAGNOSIS — M16.12 PRIMARY OSTEOARTHRITIS OF LEFT HIP: Primary | ICD-10-CM

## 2022-03-08 LAB
ALBUMIN SERPL-MCNC: 3.8 G/DL (ref 3.4–5)
ALP SERPL-CCNC: 69 U/L (ref 40–150)
ALT SERPL W P-5'-P-CCNC: 29 U/L (ref 0–70)
ANION GAP SERPL CALCULATED.3IONS-SCNC: 3 MMOL/L (ref 3–14)
AST SERPL W P-5'-P-CCNC: 18 U/L (ref 0–45)
BILIRUB SERPL-MCNC: 0.6 MG/DL (ref 0.2–1.3)
BUN SERPL-MCNC: 14 MG/DL (ref 7–30)
CALCIUM SERPL-MCNC: 9 MG/DL (ref 8.5–10.1)
CHLORIDE BLD-SCNC: 109 MMOL/L (ref 94–109)
CHOLEST SERPL-MCNC: 134 MG/DL
CO2 SERPL-SCNC: 27 MMOL/L (ref 20–32)
CREAT SERPL-MCNC: 0.99 MG/DL (ref 0.66–1.25)
FASTING STATUS PATIENT QL REPORTED: YES
GFR SERPL CREATININE-BSD FRML MDRD: 86 ML/MIN/1.73M2
GLUCOSE BLD-MCNC: 117 MG/DL (ref 70–99)
HDLC SERPL-MCNC: 46 MG/DL
LDLC SERPL CALC-MCNC: 59 MG/DL
NONHDLC SERPL-MCNC: 88 MG/DL
POTASSIUM BLD-SCNC: 4.2 MMOL/L (ref 3.4–5.3)
PROT SERPL-MCNC: 7 G/DL (ref 6.8–8.8)
PSA SERPL-MCNC: 1.99 UG/L (ref 0–4)
SODIUM SERPL-SCNC: 139 MMOL/L (ref 133–144)
TRIGL SERPL-MCNC: 145 MG/DL

## 2022-03-08 PROCEDURE — 36415 COLL VENOUS BLD VENIPUNCTURE: CPT

## 2022-03-08 PROCEDURE — 99213 OFFICE O/P EST LOW 20 MIN: CPT | Performed by: ORTHOPAEDIC SURGERY

## 2022-03-08 PROCEDURE — 73522 X-RAY EXAM HIPS BI 3-4 VIEWS: CPT | Mod: TC | Performed by: RADIOLOGY

## 2022-03-08 PROCEDURE — 80053 COMPREHEN METABOLIC PANEL: CPT

## 2022-03-08 PROCEDURE — 80061 LIPID PANEL: CPT

## 2022-03-08 PROCEDURE — G0103 PSA SCREENING: HCPCS
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
## 2022-03-08 NOTE — PATIENT INSTRUCTIONS
Diagnosis: early hip osteoarthritis.  It's a long way from needing surgery.  We could order injections into the hips to see how much pain goes away when novacaine is in there.  Otherwise, more likely spine.

## 2022-03-10 ENCOUNTER — OFFICE VISIT (OUTPATIENT)
Dept: FAMILY MEDICINE | Facility: CLINIC | Age: 62
End: 2022-03-10
Payer: COMMERCIAL

## 2022-03-10 VITALS
BODY MASS INDEX: 29.13 KG/M2 | TEMPERATURE: 98.2 F | SYSTOLIC BLOOD PRESSURE: 112 MMHG | RESPIRATION RATE: 16 BRPM | HEART RATE: 94 BPM | WEIGHT: 203.5 LBS | HEIGHT: 70 IN | DIASTOLIC BLOOD PRESSURE: 76 MMHG | OXYGEN SATURATION: 96 %

## 2022-03-10 DIAGNOSIS — Z00.00 ROUTINE GENERAL MEDICAL EXAMINATION AT A HEALTH CARE FACILITY: Primary | ICD-10-CM

## 2022-03-10 DIAGNOSIS — E78.5 HYPERLIPIDEMIA LDL GOAL <130: ICD-10-CM

## 2022-03-10 PROCEDURE — 99396 PREV VISIT EST AGE 40-64: CPT | Performed by: FAMILY MEDICINE

## 2022-03-10 RX ORDER — ATORVASTATIN CALCIUM 20 MG/1
20 TABLET, FILM COATED ORAL DAILY
Qty: 90 TABLET | Refills: 3 | Status: SHIPPED | OUTPATIENT
Start: 2022-03-10 | End: 2023-03-13

## 2022-03-10 ASSESSMENT — ENCOUNTER SYMPTOMS
EYE PAIN: 0
WEAKNESS: 0
DIARRHEA: 0
FEVER: 0
ABDOMINAL PAIN: 0
NERVOUS/ANXIOUS: 0
COUGH: 0
SHORTNESS OF BREATH: 0
MYALGIAS: 1
HEMATURIA: 0
CONSTIPATION: 0
JOINT SWELLING: 0
PALPITATIONS: 0
DIZZINESS: 0
HEMATOCHEZIA: 0
CHILLS: 0
SORE THROAT: 0
HEADACHES: 0
NAUSEA: 0
ARTHRALGIAS: 1
DYSURIA: 0
HEARTBURN: 0
FREQUENCY: 0
PARESTHESIAS: 0

## 2022-03-10 ASSESSMENT — ACTIVITIES OF DAILY LIVING (ADL): CURRENT_FUNCTION: NO ASSISTANCE NEEDED
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
## 2022-03-24 DIAGNOSIS — M48.062 SPINAL STENOSIS OF LUMBAR REGION WITH NEUROGENIC CLAUDICATION: ICD-10-CM

## 2022-03-25 RX ORDER — PREDNISONE 20 MG/1
TABLET ORAL
Qty: 18 TABLET | Refills: 1 | Status: SHIPPED | OUTPATIENT
Start: 2022-03-25 | End: 2022-08-25

## 2022-03-25 RX ORDER — GABAPENTIN 300 MG/1
CAPSULE ORAL
Qty: 90 CAPSULE | Refills: 1 | Status: SHIPPED | OUTPATIENT
Start: 2022-03-25 | End: 2022-08-25

## 2022-03-25 NOTE — TELEPHONE ENCOUNTER
Gabapentin      Last Written Prescription Date:  1/4/2022  Last Fill Quantity: 90,   # refills: 1  Last Office Visit: 3/10/2022  Future Office visit:    Next 5 appointments (look out 90 days)      Mar 28, 2022  9:20 AM  Nurse Only with PH MA/LPN  [facility] ([facility] ) [address] [place] [phone]  [phone]             Routing refill request to provider for review/approval because:  Drug not on the [facility], [facility] or [facility] refill protocol or controlled substance    Prednisone      Last Written Prescription Date:  10/15/2021  Last Fill Quantity: 18,   # refills: 1  Last Office Visit: 3/10/2022  Future Office visit:    Next 5 appointments (look out 90 days)      Mar 28, 2022  9:20 AM  Nurse Only with PH MA/LPN  [facility] ([facility] ) [address] [place] [phone]  [phone]             Routing refill request to provider for review/approval because:  Drug not on the [facility], [facility] or [facility] refill protocol or controlled substance

## 2022-03-28 ENCOUNTER — ALLIED HEALTH/NURSE VISIT (OUTPATIENT)
Dept: FAMILY MEDICINE | Facility: CLINIC | Age: 62
End: 2022-03-28
Payer: COMMERCIAL

## 2022-03-28 DIAGNOSIS — Z23 NEED FOR TDAP VACCINATION: Primary | ICD-10-CM

## 2022-03-28 PROCEDURE — 90471 IMMUNIZATION ADMIN: CPT

## 2022-03-28 PROCEDURE — 99207 PR NO CHARGE NURSE ONLY: CPT

## 2022-03-28 PROCEDURE — 90715 TDAP VACCINE 7 YRS/> IM: CPT

## 2022-03-28 NOTE — PROGRESS NOTES
Prior to immunization administration, verified patients identity using patient s name and date of birth. Please see Immunization Activity for additional information.     Screening Questionnaire for Adult Immunization    Are you sick today?   No   Do you have allergies to medications, food, a vaccine component or latex?   Yes   Have you ever had a serious reaction after receiving a vaccination?   No   Do you have a long-term health problem with heart, lung, kidney, or metabolic disease (e.g., diabetes), asthma, a blood disorder, no spleen, complement component deficiency, a cochlear implant, or a spinal fluid leak?  Are you on long-term aspirin therapy?   No   Do you have cancer, leukemia, HIV/AIDS, or any other immune system problem?   No   Do you have a parent, brother, or sister with an immune system problem?   No   In the past 3 months, have you taken medications that affect  your immune system, such as prednisone, other steroids, or anticancer drugs; drugs for the treatment of rheumatoid arthritis, Crohn s disease, or psoriasis; or have you had radiation treatments?   No   Have you had a seizure, or a brain or other nervous system problem?   No   During the past year, have you received a transfusion of blood or blood    products, or been given immune (gamma) globulin or antiviral drug?   No   For women: Are you pregnant or is there a chance you could become       pregnant during the next month?   No   Have you received any vaccinations in the past 4 weeks?   No     Immunization questionnaire was positive for at least one answer.  Notified Yes.        Per orders of Dr. Kong, injection of Tdap given by Jany Kong CMA. Patient instructed to remain in clinic for 15 minutes afterwards, and to report any adverse reaction to me immediately.       Screening performed by Jany Kong CMA on 3/28/2022 at 9:24 AM.

## 2022-05-31 ENCOUNTER — TELEPHONE (OUTPATIENT)
Dept: FAMILY MEDICINE | Facility: CLINIC | Age: 62
End: 2022-05-31
Payer: COMMERCIAL

## 2022-05-31 NOTE — TELEPHONE ENCOUNTER
RN Tickbite Protocol: Ages 8 and older  Shashi Raza is a 61 year old male is being assessed for indication of tick bite.     NURSING ASSESSMENT:   Removal of tick that has been attached at least 36 hours? no   Tick is smaller, redder, no white striping on back and more triangular in shape? yes   Tick was removed within the last 72 hours? yes     Symptoms:  none      NURSING PLAN: Nursing advice to patient home care measures for wound care    EDUCATION: How to identify a deer tick, Important time frames related to tick bite and Preventing tick bites    RECOMMENDED DISPOSITION:  Home care advice   Will comply with recommendation: Yes  Nurse Triage    Slime Edgar RN, RN

## 2022-08-25 DIAGNOSIS — G63 POLYNEUROPATHY IN OTHER DISEASES CLASSIFIED ELSEWHERE (H): ICD-10-CM

## 2022-08-25 DIAGNOSIS — M48.062 SPINAL STENOSIS OF LUMBAR REGION WITH NEUROGENIC CLAUDICATION: ICD-10-CM

## 2022-08-25 RX ORDER — PREDNISONE 20 MG/1
TABLET ORAL
Qty: 18 TABLET | Refills: 1 | Status: ON HOLD | OUTPATIENT
Start: 2022-08-25 | End: 2022-12-15

## 2022-08-25 RX ORDER — GABAPENTIN 300 MG/1
CAPSULE ORAL
Qty: 90 CAPSULE | Refills: 1 | Status: SHIPPED | OUTPATIENT
Start: 2022-08-25 | End: 2023-03-13

## 2022-08-25 RX ORDER — METHOCARBAMOL 500 MG/1
TABLET, FILM COATED ORAL
Qty: 90 TABLET | Refills: 3 | Status: ON HOLD | OUTPATIENT
Start: 2022-08-25 | End: 2022-12-16

## 2022-08-25 NOTE — TELEPHONE ENCOUNTER
Requested Prescriptions   Pending Prescriptions Disp Refills     predniSONE (DELTASONE) 20 MG tablet [Pharmacy Med Name: PREDNISONE 20MG TABS] 18 tablet 1     Sig: TAKE THREE TABLETS BY MOUTH ONCE DAILY FOR 3 DAYS, THEN TAKE TWO TABLETS BY MOUTH ONCE DAILY FOR 3 DAYS , THEN TAKE ONE TABLET BY MOUTH ONCE DAILY FOR 3 DAYS     Last Written Prescription Date:  03/25/2022  Last Fill Quantity: 18,   # refills: 1  Last Office Visit: 03/10/2022  Future Office visit:       Routing refill request to provider for review/approval because:  Drug not on the [facility], [facility] or [facility] refill protocol or controlled substance              gabapentin (NEURONTIN) 300 MG capsule [Pharmacy Med Name: GABAPENTIN 300MG CAPS] 90 capsule 1     Sig: TAKE ONE CAPSULE BY MOUTH THREE TIMES A DAY     Last Written Prescription Date:  03/25/2022  Last Fill Quantity: 90,   # refills: 1  Last Office Visit: 03/10/2022  Future Office visit:       Routing refill request to provider for review/approval because:  Drug not on the [facility], [facility] refill protocol or controlled substance              methocarbamol (ROBAXIN) 500 MG tablet [Pharmacy Med Name: METHOCARBAMOL 500MG TABS] 90 tablet 3     Sig: TAKE TWO TABLETS BY MOUTH THREE TIMES A DAY AS NEEDED FOR MUSCLE SPASMS     Last Written Prescription Date:  01/04/2022  Last Fill Quantity: 90,   # refills: 3  Last Office Visit: 03/10/2022  Future Office visit:       Routing refill request to provider for review/approval because:  Drug not on the [facility], [facility] or [facility] refill protocol or controlled substance

## 2022-09-14 ENCOUNTER — APPOINTMENT (OUTPATIENT)
Dept: GENERAL RADIOLOGY | Facility: CLINIC | Age: 62
End: 2022-09-14
Attending: NURSE PRACTITIONER
Payer: COMMERCIAL

## 2022-09-14 ENCOUNTER — HOSPITAL ENCOUNTER (EMERGENCY)
Facility: CLINIC | Age: 62
Discharge: HOME | End: 2022-09-14
Attending: NURSE PRACTITIONER | Admitting: NURSE PRACTITIONER
Payer: COMMERCIAL

## 2022-09-14 VITALS
BODY MASS INDEX: 28.7 KG/M2 | DIASTOLIC BLOOD PRESSURE: 87 MMHG | SYSTOLIC BLOOD PRESSURE: 125 MMHG | RESPIRATION RATE: 18 BRPM | TEMPERATURE: 98 F | WEIGHT: 200 LBS | HEART RATE: 88 BPM | OXYGEN SATURATION: 99 %

## 2022-09-14 DIAGNOSIS — S61.209A FLEXOR TENDON LACERATION OF FINGER WITH OPEN WOUND, INITIAL ENCOUNTER: ICD-10-CM

## 2022-09-14 DIAGNOSIS — S61.211A LACERATION OF LEFT INDEX FINGER WITHOUT FOREIGN BODY WITHOUT DAMAGE TO NAIL, INITIAL ENCOUNTER: ICD-10-CM

## 2022-09-14 DIAGNOSIS — S56.129A FLEXOR TENDON LACERATION OF FINGER WITH OPEN WOUND, INITIAL ENCOUNTER: ICD-10-CM

## 2022-09-14 PROCEDURE — 12002 RPR S/N/AX/GEN/TRNK2.6-7.5CM: CPT | Performed by: NURSE PRACTITIONER

## 2022-09-14 PROCEDURE — 73140 X-RAY EXAM OF FINGER(S): CPT | Mod: LT

## 2022-09-14 PROCEDURE — 99284 EMERGENCY DEPT VISIT MOD MDM: CPT | Mod: 25 | Performed by: NURSE PRACTITIONER

## 2022-09-14 PROCEDURE — 99283 EMERGENCY DEPT VISIT LOW MDM: CPT | Mod: 25 | Performed by: NURSE PRACTITIONER

## 2022-09-14 RX ORDER — GINSENG 100 MG
CAPSULE ORAL
Status: DISCONTINUED
Start: 2022-09-14 | End: 2022-09-14 | Stop reason: HOSPADM

## 2022-09-14 ASSESSMENT — ACTIVITIES OF DAILY LIVING (ADL): ADLS_ACUITY_SCORE: 35

## 2022-09-14 NOTE — ED TRIAGE NOTES
"L hand index finger injury 'caught in the \".     Triage Assessment     Row Name 09/14/22 8201       Triage Assessment (Adult)    Airway WDL WDL       Respiratory WDL    Respiratory WDL WDL       Cardiac WDL    Cardiac WDL WDL              "

## 2022-09-14 NOTE — DISCHARGE INSTRUCTIONS
Augmentin 875 mg twice a day for 5 days.  Keep the dressing in place until tomorrow, then change dressing twice a day. Use finger splint to keep finger immobilized.   I have concern for flexor tendon laceration which needs follow-up with orthopedics.   Follow-up with orthopedics. Referral has been sent. They should call you or you can contact them to set-up appointment [phone].

## 2022-09-15 ENCOUNTER — OFFICE VISIT (OUTPATIENT)
Dept: FAMILY MEDICINE | Facility: CLINIC | Age: 62
End: 2022-09-15
Payer: COMMERCIAL

## 2022-09-15 VITALS
TEMPERATURE: 97.3 F | OXYGEN SATURATION: 95 % | BODY MASS INDEX: 28.75 KG/M2 | DIASTOLIC BLOOD PRESSURE: 68 MMHG | RESPIRATION RATE: 20 BRPM | HEART RATE: 76 BPM | WEIGHT: 200.4 LBS | SYSTOLIC BLOOD PRESSURE: 122 MMHG

## 2022-09-15 DIAGNOSIS — R10.31 RLQ ABDOMINAL PAIN: ICD-10-CM

## 2022-09-15 DIAGNOSIS — M54.12 CERVICAL RADICULOPATHY: Primary | ICD-10-CM

## 2022-09-15 PROCEDURE — 99214 OFFICE O/P EST MOD 30 MIN: CPT | Performed by: FAMILY MEDICINE

## 2022-09-15 ASSESSMENT — ENCOUNTER SYMPTOMS: ABDOMINAL PAIN: 1

## 2022-09-15 ASSESSMENT — PAIN SCALES - GENERAL: PAINLEVEL: MODERATE PAIN (5)

## 2022-09-15 NOTE — PROGRESS NOTES
"  Assessment & Plan     Cervical radiculopathy  Seems to be getting worse.  Tingling and weakness in his upper extremities.  Currently he has a splint on his left index finger he has a tendon laceration and is possibly going to need surgical intervention.    RLQ abdominal pain  This continues to bother him.  He has had previous visits over the year.  CT scan done 6 months ago of the abdomen pelvis was unremarkable.  No troubles with bowel or bladder.  Recommended follow-up with a colonoscopy but will wait till he gets this finger taken care of.    Left Index Finger tendon Laceration    Because of this situation more of an exam was done here in anticipation that he may need a preoperative physical.  The results of this examination today clear him for surgery for his finger.           BMI:   Estimated body mass index is 28.75 kg/m  as calculated from the following:    Height as of 3/10/22: 1.778 m (5' 10\").    Weight as of this encounter: 90.9 kg (200 lb 6.4 oz).           No follow-ups on file.    Yoni Kong MD  [facility] 61 year old, presenting for the following health issues:  Abdominal Pain and Neck Pain  Follow-up of his right lower quadrant abdominal pain and neck pain which is chronic getting more of a problem.  Having more trouble with weakness in tingling in his upper extremities.  His lower abdominal pain is stable not really changed but there is been no answer.  He also now lacerated his left index finger has it probable tendon laceration and may need surgical repair.    Abdominal Pain     History of Present Illness                 Review of Systems   Gastrointestinal: Positive for abdominal pain.      Constitutional, HEENT, cardiovascular, pulmonary, GI, , musculoskeletal, neuro, skin, endocrine and psych systems are negative, except as otherwise noted.      Objective    /68 (BP Location: Left arm, Patient Position: Chair, Cuff Size: Adult " Large)   Pulse 76   Temp 97.3  F (36.3  C) (Temporal)   Resp 20   Wt 90.9 kg (200 lb 6.4 oz)   SpO2 95%   BMI 28.75 kg/m    Body mass index is 28.75 kg/m .  Physical Exam   GENERAL: healthy, alert and no distress  EYES: Eyes grossly normal to inspection, PERRL and conjunctivae and sclerae normal  NECK: no adenopathy, no asymmetry, masses, or scars, thyroid normal to palpation and decreased range of motion.  RESP: lungs clear to auscultation - no rales, rhonchi or wheezes  CV: regular rate and rhythm, normal S1 S2, no S3 or S4, no murmur, click or rub, no peripheral edema and peripheral pulses strong  MS: Left index finger in a splint.  SKIN: no suspicious lesions or rashes  NEURO: weakness of  in upper extremities although left and is difficult to evaluate., sensory exam grossly normal and mentation intact  PSYCH: mentation appears normal, affect normal/bright
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
## 2022-09-15 NOTE — TELEPHONE ENCOUNTER
DIAGNOSIS: Laceration of left index finger without foreign body without damage to nail, initial encounter, Flexor tendon laceration of finger with open wound, initial   APPOINTMENT DATE: 09/20/2022   NOTES STATUS DETAILS   DISCHARGE REPORT from the ER Internal 09/14/2022 - [facility] ED   MEDICATION LIST Internal    LABS     CBC/DIFF Internal 01/21/2021   XRAYS (IMAGES & REPORTS) Internal 09/14/2022 - LT Finger
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
## 2022-09-20 ENCOUNTER — PRE VISIT (OUTPATIENT)
Dept: ORTHOPEDICS | Facility: CLINIC | Age: 62
End: 2022-09-20

## 2022-09-20 ENCOUNTER — TELEPHONE (OUTPATIENT)
Dept: FAMILY MEDICINE | Facility: CLINIC | Age: 62
End: 2022-09-20

## 2022-09-20 ENCOUNTER — LAB (OUTPATIENT)
Dept: LAB | Facility: CLINIC | Age: 62
End: 2022-09-20

## 2022-09-20 ENCOUNTER — OFFICE VISIT (OUTPATIENT)
Dept: ORTHOPEDICS | Facility: CLINIC | Age: 62
End: 2022-09-20
Attending: NURSE PRACTITIONER
Payer: COMMERCIAL

## 2022-09-20 DIAGNOSIS — S61.209A FLEXOR TENDON LACERATION OF FINGER WITH OPEN WOUND, INITIAL ENCOUNTER: ICD-10-CM

## 2022-09-20 DIAGNOSIS — S61.211A LACERATION OF LEFT INDEX FINGER WITHOUT FOREIGN BODY WITHOUT DAMAGE TO NAIL, INITIAL ENCOUNTER: ICD-10-CM

## 2022-09-20 DIAGNOSIS — S56.129A FLEXOR TENDON LACERATION OF FINGER WITH OPEN WOUND, INITIAL ENCOUNTER: ICD-10-CM

## 2022-09-20 PROCEDURE — 99204 OFFICE O/P NEW MOD 45 MIN: CPT | Performed by: STUDENT IN AN ORGANIZED HEALTH CARE EDUCATION/TRAINING PROGRAM

## 2022-09-20 PROCEDURE — U0003 INFECTIOUS AGENT DETECTION BY NUCLEIC ACID (DNA OR RNA); SEVERE ACUTE RESPIRATORY SYNDROME CORONAVIRUS 2 (SARS-COV-2) (CORONAVIRUS DISEASE [COVID-19]), AMPLIFIED PROBE TECHNIQUE, MAKING USE OF HIGH THROUGHPUT TECHNOLOGIES AS DESCRIBED BY CMS-2020-01-R: HCPCS | Performed by: STUDENT IN AN ORGANIZED HEALTH CARE EDUCATION/TRAINING PROGRAM

## 2022-09-20 NOTE — NURSING NOTE
Teaching Flowsheet   Relevant Diagnosis: Flexor tendon laceration of left index finger with open wound  Teaching Topic: Repair, left tendon, flexor avulsion index finger    [facility] under [facility] with [facility] anesthesia with Dr Kyara Cleaning, scheduled as urgent surgery ASAP on 9-23-22.    Patient was provided a letter from Dr Cleaning since they will have to cancel flight and rental car to [place] that was planned on 9-22-22.    Patient was able to contact PCP office and get in for a pre-op prior to surgery.  Covid test collected in [facility] lab today.     Person(s) involved in teaching:   Patient and Wife     Motivation Level:  Asks Questions: Yes  Eager to Learn: Yes  Cooperative: Yes  Receptive (willing/able to accept information): Yes  Any cultural factors/Moravian beliefs that may influence understanding or compliance? No    Patient demonstrates understanding of the following:  Reason for the appointment, diagnosis and treatment plan: Yes  Knowledge of proper use of medications and conditions for which they are ordered (with special attention to potential side effects or drug interactions): Yes  Which situations necessitate calling provider and whom to contact: Yes    Teaching Concerns Addressed:   Proper use and care of  (medical equip, care aids, etc.):   Nutritional needs and diet plan: Yes  Pain management techniques: Yes  Wound Care: Yes  How and/when to access community resources: Yes     Instructional Materials Used/Given:   Preoperative surgery packet, antibacterial Chlorhexidine soap. Stop Light Tool reviewed, patient verbalized understanding, had no immediate questions. Lauren Anaya RN

## 2022-09-20 NOTE — TELEPHONE ENCOUNTER
Patient had a visit with you last week but needs a pre-op for surgery on Friday. Appointment was made for tomorrow unless recent appointment can be used as pre-op. Please advise so we can cancel if needed.    SAV MartinoN, RN
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
## 2022-09-20 NOTE — NURSING NOTE
Reason For Visit:   Chief Complaint   Patient presents with     Consult     Laceration of left index finger flexor tendon. Cut finger on a grinding wheel last Wednesday 9/14/22       Primary MD: Yoni Kong  Ref. MD: ED    Age: 61 year old    ?  No      There were no vitals taken for this visit.      Pain Assessment  Patient Currently in Pain: Yes  0-10 Pain Scale: 6  Primary Pain Location: Finger (Comment which one) (left index finger)  Pain Descriptors: Throbbing    Hand Dominance Evaluation  Hand Dominance: Right          QuickDASH Assessment  QuickDASH Main 9/19/2022   1. Open a tight or new jar. Unable   2. Do heavy household chores (e.g., wash walls, floors) Unable   3. Carry a shopping bag or briefcase. Mild difficulty   4. Wash your back. Unable   5. Use a knife to cut food. Unable   6. Recreational activities in which you take some force or impact through your arm, shoulder or hand (e.g., golf, hammering, tennis, etc.). Unable   7. During the past week, to what extent has your arm, shoulder or hand problem interfered with your normal social activities with family, friends, neighbours or groups? Quite a bit   8. During the past week, were you limited in your work or other regular daily activities as a result of your arm, shoulder or hand problem? Very limited   9. Arm, shoulder or hand pain. Severe   10.Tingling (pins and needles) in your arm,shoulder or hand. Mild   11. During the past week, how much difficulty have you had sleeping because of the pain in your arm, shoulder or hand? Moderate difficulty   Quickdash Ability Score 75          Current Outpatient Medications   Medication Sig Dispense Refill     atorvastatin (LIPITOR) 20 MG tablet Take 1 tablet (20 mg) by mouth daily (Patient taking differently: Take 20 mg by mouth At Bedtime) 90 tablet 3     gabapentin (NEURONTIN) 300 MG capsule TAKE ONE CAPSULE BY MOUTH THREE TIMES A DAY (Patient taking differently: Take 300 mg by mouth 3 times  daily as needed for neuropathic pain) 90 capsule 1     meloxicam (MOBIC) 15 MG tablet Take 1 tablet (15 mg) by mouth daily 60 tablet 1     methocarbamol (ROBAXIN) 500 MG tablet TAKE TWO TABLETS BY MOUTH THREE TIMES A DAY AS NEEDED FOR MUSCLE SPASMS (Patient taking differently: Take 1,000 mg by mouth 3 times daily as needed for muscle spasms) 90 tablet 3     predniSONE (DELTASONE) 20 MG tablet TAKE THREE TABLETS BY MOUTH ONCE DAILY FOR 3 DAYS, THEN TAKE TWO TABLETS BY MOUTH ONCE DAILY FOR 3 DAYS , THEN TAKE ONE TABLET BY MOUTH ONCE DAILY FOR 3 DAYS 18 tablet 1       Allergies   Allergen Reactions     Codeine      Doesn't recall       BJ COLLINS, ATC

## 2022-09-20 NOTE — LETTER
September 20, 2022      Shashi Raza   [address] [facility] [address]   1960        To Whom It May Concern,    Shashi Raza is my patient who is undergoing urgent hand surgery.  Because of the urgent and time sensitive nature of his surgery, he is unable to travel.  I hope you can help this patient by providing a refund or credit toward their rental car.    Please do not hesitate to contact me if you have any further questions.    Sincerely,      BA LO

## 2022-09-20 NOTE — LETTER
September 20, 2022      Shashi Raza   [address] [facility] [address]   1960        To Whom It May Concern,    Shashi Raza is my patient who is undergoing urgent hand surgery.  Because of the urgent and time sensitive nature of his surgery, he is unable to travel.  I hope you can help this patient by providing a refund or credit toward their plane ticket.    Please do not hesitate to contact me if you have any further questions.    Sincerely,      BA LO

## 2022-09-21 ENCOUNTER — TELEPHONE (OUTPATIENT)
Dept: ORTHOPEDICS | Facility: CLINIC | Age: 62
End: 2022-09-21

## 2022-09-21 LAB — SARS-COV-2 RNA RESP QL NAA+PROBE: NEGATIVE

## 2022-09-21 NOTE — TELEPHONE ENCOUNTER
Left message on surgery centers voicemail that Dr. Kong addended his 9/15/22 note.  They can use that for the preop.

## 2022-09-21 NOTE — TELEPHONE ENCOUNTER
Per Dr Cleaning, if he did not have the procedure the function of his finger would not improve, it would remain as it is right now.  Whatever ability he has now to bend or move the finger would not change.  Patient informed.  Patient states that he is going to proceed with his surgery as planned. He will call with further questions or concerns.  MD informed. Lauren Anaya RN

## 2022-09-21 NOTE — TELEPHONE ENCOUNTER
Patient spouse called RN to ask a question about the planned surgery for 9-23-22 with Dr Cleaning for a flexor tendon repair.    There was a lot of information given yesterday and they do not recall about what his finger function would be like if he did not have surgery and allowed his injury to heal as-is.     They understand that the tendon ruptures do not heal without repair, but would the surgery give him a chance of more movement of the distal finger, or would it be about the same result.     Message to provider for review, patient OK if RN calls back with provider response. Lauren Anaya, RN

## 2022-09-22 ENCOUNTER — ANESTHESIA EVENT (OUTPATIENT)
Dept: SURGERY | Facility: AMBULATORY SURGERY CENTER | Age: 62
End: 2022-09-22
Payer: COMMERCIAL

## 2022-09-22 RX ORDER — NALOXONE HYDROCHLORIDE 0.4 MG/ML
0.2 INJECTION, SOLUTION INTRAMUSCULAR; INTRAVENOUS; SUBCUTANEOUS
Status: DISCONTINUED | OUTPATIENT
Start: 2022-09-22 | End: 2022-09-24 | Stop reason: HOSPADM

## 2022-09-22 RX ORDER — NALOXONE HYDROCHLORIDE 0.4 MG/ML
0.4 INJECTION, SOLUTION INTRAMUSCULAR; INTRAVENOUS; SUBCUTANEOUS
Status: DISCONTINUED | OUTPATIENT
Start: 2022-09-22 | End: 2022-09-24 | Stop reason: HOSPADM
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
## 2022-09-23 ENCOUNTER — HOSPITAL ENCOUNTER (OUTPATIENT)
Facility: AMBULATORY SURGERY CENTER | Age: 62
Discharge: HOME | End: 2022-09-23
Attending: STUDENT IN AN ORGANIZED HEALTH CARE EDUCATION/TRAINING PROGRAM
Payer: COMMERCIAL

## 2022-09-23 ENCOUNTER — ANESTHESIA (OUTPATIENT)
Dept: SURGERY | Facility: AMBULATORY SURGERY CENTER | Age: 62
End: 2022-09-23
Payer: COMMERCIAL

## 2022-09-23 VITALS
RESPIRATION RATE: 16 BRPM | BODY MASS INDEX: 28.63 KG/M2 | TEMPERATURE: 96.8 F | OXYGEN SATURATION: 95 % | HEART RATE: 90 BPM | HEIGHT: 70 IN | WEIGHT: 200 LBS | DIASTOLIC BLOOD PRESSURE: 67 MMHG | SYSTOLIC BLOOD PRESSURE: 109 MMHG

## 2022-09-23 DIAGNOSIS — S61.209A FLEXOR TENDON LACERATION OF FINGER WITH OPEN WOUND, INITIAL ENCOUNTER: ICD-10-CM

## 2022-09-23 DIAGNOSIS — S56.129A FLEXOR TENDON LACERATION OF FINGER WITH OPEN WOUND, INITIAL ENCOUNTER: ICD-10-CM

## 2022-09-23 PROCEDURE — 26373 REPAIR FINGER/HAND TENDON: CPT | Mod: F1

## 2022-09-23 PROCEDURE — 26373 REPAIR FINGER/HAND TENDON: CPT | Mod: F1 | Performed by: STUDENT IN AN ORGANIZED HEALTH CARE EDUCATION/TRAINING PROGRAM

## 2022-09-23 RX ORDER — ONDANSETRON 2 MG/ML
INJECTION INTRAMUSCULAR; INTRAVENOUS PRN
Status: DISCONTINUED | OUTPATIENT
Start: 2022-09-23 | End: 2022-09-23

## 2022-09-23 RX ORDER — SODIUM CHLORIDE, SODIUM LACTATE, POTASSIUM CHLORIDE, CALCIUM CHLORIDE 600; 310; 30; 20 MG/100ML; MG/100ML; MG/100ML; MG/100ML
INJECTION, SOLUTION INTRAVENOUS CONTINUOUS
Status: DISCONTINUED | OUTPATIENT
Start: 2022-09-23 | End: 2022-09-24 | Stop reason: HOSPADM

## 2022-09-23 RX ORDER — FLUMAZENIL 0.1 MG/ML
0.2 INJECTION, SOLUTION INTRAVENOUS
Status: DISCONTINUED | OUTPATIENT
Start: 2022-09-23 | End: 2022-09-24 | Stop reason: HOSPADM

## 2022-09-23 RX ORDER — ONDANSETRON 4 MG/1
4 TABLET, ORALLY DISINTEGRATING ORAL EVERY 30 MIN PRN
Status: DISCONTINUED | OUTPATIENT
Start: 2022-09-23 | End: 2022-09-24 | Stop reason: HOSPADM

## 2022-09-23 RX ORDER — HYDROMORPHONE HYDROCHLORIDE 1 MG/ML
0.2 INJECTION, SOLUTION INTRAMUSCULAR; INTRAVENOUS; SUBCUTANEOUS EVERY 5 MIN PRN
Status: DISCONTINUED | OUTPATIENT
Start: 2022-09-23 | End: 2022-09-24 | Stop reason: HOSPADM

## 2022-09-23 RX ORDER — BUPIVACAINE HYDROCHLORIDE 5 MG/ML
INJECTION, SOLUTION EPIDURAL; INTRACAUDAL
Status: COMPLETED | OUTPATIENT
Start: 2022-09-23 | End: 2022-09-23

## 2022-09-23 RX ORDER — ACETAMINOPHEN 325 MG/1
975 TABLET ORAL ONCE
Status: COMPLETED | OUTPATIENT
Start: 2022-09-23 | End: 2022-09-23

## 2022-09-23 RX ORDER — LIDOCAINE 40 MG/G
CREAM TOPICAL
Status: DISCONTINUED | OUTPATIENT
Start: 2022-09-23 | End: 2022-09-24 | Stop reason: HOSPADM

## 2022-09-23 RX ORDER — KETOROLAC TROMETHAMINE 30 MG/ML
INJECTION, SOLUTION INTRAMUSCULAR; INTRAVENOUS PRN
Status: DISCONTINUED | OUTPATIENT
Start: 2022-09-23 | End: 2022-09-23

## 2022-09-23 RX ORDER — FENTANYL CITRATE 50 UG/ML
25 INJECTION, SOLUTION INTRAMUSCULAR; INTRAVENOUS EVERY 5 MIN PRN
Status: DISCONTINUED | OUTPATIENT
Start: 2022-09-23 | End: 2022-09-24 | Stop reason: HOSPADM

## 2022-09-23 RX ORDER — FENTANYL CITRATE 50 UG/ML
25 INJECTION, SOLUTION INTRAMUSCULAR; INTRAVENOUS
Status: DISCONTINUED | OUTPATIENT
Start: 2022-09-23 | End: 2022-09-24 | Stop reason: HOSPADM

## 2022-09-23 RX ORDER — ONDANSETRON 2 MG/ML
4 INJECTION INTRAMUSCULAR; INTRAVENOUS EVERY 30 MIN PRN
Status: DISCONTINUED | OUTPATIENT
Start: 2022-09-23 | End: 2022-09-24 | Stop reason: HOSPADM

## 2022-09-23 RX ORDER — MEPERIDINE HYDROCHLORIDE 25 MG/ML
12.5 INJECTION INTRAMUSCULAR; INTRAVENOUS; SUBCUTANEOUS
Status: DISCONTINUED | OUTPATIENT
Start: 2022-09-23 | End: 2022-09-24 | Stop reason: HOSPADM

## 2022-09-23 RX ORDER — CEFAZOLIN SODIUM 2 G/50ML
2 SOLUTION INTRAVENOUS SEE ADMIN INSTRUCTIONS
Status: DISCONTINUED | OUTPATIENT
Start: 2022-09-23 | End: 2022-09-24 | Stop reason: HOSPADM

## 2022-09-23 RX ORDER — OXYCODONE HYDROCHLORIDE 5 MG/1
5 TABLET ORAL EVERY 6 HOURS PRN
Qty: 12 TABLET | Refills: 0 | Status: SHIPPED | OUTPATIENT
Start: 2022-09-23 | End: 2022-09-26

## 2022-09-23 RX ORDER — PROPOFOL 10 MG/ML
INJECTION, EMULSION INTRAVENOUS CONTINUOUS PRN
Status: DISCONTINUED | OUTPATIENT
Start: 2022-09-23 | End: 2022-09-23

## 2022-09-23 RX ORDER — OXYCODONE HYDROCHLORIDE 5 MG/1
5 TABLET ORAL EVERY 4 HOURS PRN
Status: DISCONTINUED | OUTPATIENT
Start: 2022-09-23 | End: 2022-09-24 | Stop reason: HOSPADM

## 2022-09-23 RX ORDER — FENTANYL CITRATE 50 UG/ML
25-50 INJECTION, SOLUTION INTRAMUSCULAR; INTRAVENOUS
Status: DISCONTINUED | OUTPATIENT
Start: 2022-09-23 | End: 2022-09-24 | Stop reason: HOSPADM

## 2022-09-23 RX ORDER — CEFAZOLIN SODIUM 2 G/50ML
2 SOLUTION INTRAVENOUS
Status: COMPLETED | OUTPATIENT
Start: 2022-09-23 | End: 2022-09-23

## 2022-09-23 RX ORDER — MAGNESIUM HYDROXIDE 1200 MG/15ML
LIQUID ORAL PRN
Status: DISCONTINUED | OUTPATIENT
Start: 2022-09-23 | End: 2022-09-23 | Stop reason: HOSPADM

## 2022-09-23 RX ADMIN — PROPOFOL 150 MCG/KG/MIN: 10 INJECTION, EMULSION INTRAVENOUS at 12:36

## 2022-09-23 RX ADMIN — BUPIVACAINE HYDROCHLORIDE 15 ML: 5 INJECTION, SOLUTION EPIDURAL; INTRACAUDAL at 12:08

## 2022-09-23 RX ADMIN — ACETAMINOPHEN 975 MG: 325 TABLET ORAL at 11:50

## 2022-09-23 RX ADMIN — SODIUM CHLORIDE, SODIUM LACTATE, POTASSIUM CHLORIDE, CALCIUM CHLORIDE: 600; 310; 30; 20 INJECTION, SOLUTION INTRAVENOUS at 11:52

## 2022-09-23 RX ADMIN — CEFAZOLIN SODIUM 2 G: 2 SOLUTION INTRAVENOUS at 12:28

## 2022-09-23 RX ADMIN — ONDANSETRON 4 MG: 2 INJECTION INTRAMUSCULAR; INTRAVENOUS at 12:46

## 2022-09-23 RX ADMIN — KETOROLAC TROMETHAMINE 30 MG: 30 INJECTION, SOLUTION INTRAMUSCULAR; INTRAVENOUS at 13:53

## 2022-09-23 RX ADMIN — FENTANYL CITRATE 50 MCG: 50 INJECTION, SOLUTION INTRAMUSCULAR; INTRAVENOUS at 11:59

## 2022-09-23 NOTE — OP NOTE
OPERATIVE REPORT    PATIENT NAME: Shashi Raza  MRN: [id number]    DATE: 9/23/2022    PREOPERATIVE DIAGNOSIS:   1. Laceration of flexor tendon (FDP) of the left index finger in zone 1    POSTOPERATIVE DIAGNOSIS:   1. Laceration of flexor tendon (FDP) of the left index finger in zone 1    OPERATION:   1. Left index finger flexor digitorum profundus (FDP) repair in zone 1, without graft    SURGEON: Kyara Cleaning MD    STAFF: Circulator: Linda Agosto RN  Scrub Person: Yuli Sheehan    ANESTHESIA: Regional block and IV sedation    IMPLANTS: * No implants in log *    SPECIMEN: * No specimens in log *    ESTIMATED BLOOD LOSS: < 5 mL.    FLUIDS: See anesthesia records.     URINE OUTPUT:  Not applicable.  Mendes was not placed.    TOURNIQUET TIME: 65 minutes.    COMPLICATION: None.    INDICATIONS: Shashi Raza is a 61 year old male who sustained a flexor tendon laceration of the left index finger.  The injury occurred on 9/14/2022 as a result of a  injury.  He presented to the clinic on 9/20/2022 with the inability to flex his left index finger DIP joint.  Sensation was intact to two-point discrimination on the index finger.  Due to the flexor tendon injury, surgical repair was indicated. The risks of surgery discussed with the patient include but are not limited to infection, bleeding, nerve injury, permanent numbness, post-operative stiffness/adhesions, surgical scar, CRPS, anesthetic complications, etc.  We also discussed that there is a possibility that the surgery will not be successful and will require repeat surgery. We reviewed post-operative pain management and rehabilitation.  All questions answered.  The patient fully understands and is agreeable to proceeding with the surgical procedure. An informed consent was signed.     DESCRIPTION OF PROCEDURE: The patient was taken to the operating room and placed in supine position on the operating table. Anesthesia was administered by the  Department of Anesthesia followed by administration of antibiotics. A high arm tourniquet was applied to the left upper extremity, which was then prepped and draped in the usual sterile fashion.  A timeout was performed with all OR staff.  The patient name, MRN, operative extremity, procedure, allergies, antibiotics, DVT prophylaxis/SCDs, and fire precautions/plan were reviewed, and all were in agreement. The extremity was exsanguinated with an Esmarch bandage and the tourniquet was inflated to 250 mmHg.    The healed traumatic laceration was identified and extended proximally and distally using a Waldemar incision extending from the distal phalanx to the level of the proximal phalanx.  Skin and subcutaneous tissues were sharply incised.  Blunt dissection was carried down to the level of the flexor sheath.  The radial and ulnar neurovascular bundles were identified and protected.  The distal stump of the tendon was identified with a laceration proximal to the DIP joint.  Using a milking technique, the proximal tendon stump was brought into the surgical field.  The tendon was then passed under the A4 pulley.  Using a Bennell suture technique, a 3-0 Prolene suture was placed in the proximal tendon stump.  The insertion site of the FDP tendon on the distal phalanx volar surface was decorticated.  2 Leo needles were passed from volar to distal through the planned insertion site and out dorsally through the nail plate. The Prolene suture tails were then placed through the Leo needles and pulled out dorsally.  The tails were then tied over a padded button.  Following repair, the finger was passively held in flexion.  Gentle passive extension did not result in gapping at the repair site or bowstringing.    The incision was copiously irrigated.  Closure was performed with 4-0 plain gut.  Soft dressings and a splint were applied.  The tourniquet was let down.  Capillary refill was intact, < 2 seconds.     The patient was  aroused from anesthesia and taken to the recovery room in stable condition.      All counts were correct at the end of the case.       There were no complications.      BA LO MD

## 2022-09-23 NOTE — ANESTHESIA CARE TRANSFER NOTE
Patient: Shashi Raza    Procedure: Procedure(s):  REPAIR, TENDON, FLEXOR AVULSION INDEX LEFT FINGER       Diagnosis: Flexor tendon laceration of finger with open wound, initial encounter [S56.543A, S60.341A]  Diagnosis Additional Information: No value filed.    Anesthesia Type:   MAC     Note:    Oropharynx: oropharynx clear of all foreign objects and spontaneously breathing  Level of Consciousness: awake  Oxygen Supplementation: room air    Independent Airway: airway patency satisfactory and stable  Dentition: dentition unchanged  Vital Signs Stable: post-procedure vital signs reviewed and stable  Report to RN Given: handoff report given  Patient transferred to: Phase II    Handoff Report: Identifed the Patient, Identified the Reponsible Provider, Reviewed the pertinent medical history, Discussed the surgical course, Reviewed Intra-OP anesthesia mangement and issues during anesthesia, Set expectations for post-procedure period and Allowed opportunity for questions and acknowledgement of understanding      Vitals:  Vitals Value Taken Time   /76    Temp 96.8    Pulse 78    Resp 16    SpO2 96        Electronically Signed By: WEI Pang CRNA  September 23, 2022  2:13 PM

## 2022-09-23 NOTE — DISCHARGE INSTRUCTIONS
"[facility]  Home Care Following Anesthesia  For 24 hours after surgery:  Get plenty of rest.  A responsible adult must stay with you for at least 24 hours after you leave the surgery center.  Do not drive or use heavy equipment.  If you have weakness or tingling, don't drive or use heavy equipment until this feeling goes away.   Do not drink alcohol.   Avoid strenuous or risky activities.  Ask for help when climbing stairs.  You may feel lightheaded.  IF so, sit for a few minutes before standing.  Have someone help you get up.   If you have nausea (feel sick to your stomach): Drink only clear liquids such as apple juice, ginger ale, broth or 7-Up.  Rest may also help.  Be sure to drink enough fluids.  Move to a regular diet as you feel able.   You may have a slight fever.  Call the doctor if your fever is over 100 F (37.7 C) (taken under the tongue) or lasts longer than 24 hours.  You may have a dry mouth, a sore throat, muscle aches or trouble sleeping. These should go away after 24 hours.  Do not make important or legal decisions.   It is recommended to avoid smoking.        Today you received a Marcaine or bupivacaine block to numb the nerves near your surgery site.  This is a block using local anesthetic or \"numbing\" medication injected around the nerves to anesthetize or \"numb\" the area supplied by those nerves.  This block is injected into the muscle layer near your surgical site.  The medication may numb the location where you had surgery for 6-18 hours, but may last up to 24 hours.  If your surgical site is an arm or leg you should be careful with your affected limb, since it is possible to injure your limb without being aware of it due to the numbing.  Until full feeling returns, you should guard against bumping or hitting your limb, and avoid extreme hot or cold temperatures on the skin.  As the block wears off, the feeling will return as a tingling or prickly " sensation near your surgical site.  You will experience more discomfort from your incision as the feeling returns.  You may want to take a pain pill (a narcotic or Tylenol if this was prescribed by your surgeon) when you start to experience mild pain before the pain beccomes more severe.  If your pain medications do not control your pain you should notifiy your surgeon.    Tips for taking pain medications  To get the best pain relief possible, remember these points:  Take pain medications as directed, before pain becomes severe.  Pain medication can upset your stomach: taking it with food may help.  Constipation is a common side effect of pain medication. Drink plenty of  fluids.  Eat foods high in fiber. Take a stool softener if recommended by your doctor or pharmacist.  Do not drink alcohol, drive or operate machinery while taking pain medications.  Ask about other ways to control pain, such as with heat, ice or relaxation.    Tylenol/Acetaminophen Consumption  To help encourage the safe use of acetaminophen, the makers of TYLENOL  have lowered the maximum daily dose for single-ingredient Extra Strength TYLENOL  (acetaminophen) products sold in the [place] from 8 pills per day (4,000 mg) to 6 pills per day (3,000 mg). The dosing interval has also changed from 2 pills every 4-6 hours to 2 pills every 6 hours.  If you feel your pain relief is insufficient, you may take Tylenol/Acetaminophen in addition to your narcotic pain medication.   Be careful not to exceed 3,000 mg of Tylenol/Acetaminophen in a 24 hour period from all sources.  If you are taking extra strength Tylenol/acetaminophen (500 mg), the maximum dose is 6 tablets in 24 hours.  If you are taking regular strength acetaminophen (325 mg), the maximum dose is 9 tablets in 24 hours.    Call a doctor for any of the following:  Signs of infection (fever, growing tenderness at the surgery site, a large amount of drainage or bleeding, severe pain, foul-smelling  drainage, redness, swelling).  It has been over 8 to 10 hours since surgery and you are still not able to urinate (pass water).  Headache for over 24 hours.  Numbness, tingling or weakness the day after surgery (if you had spinal anesthesia).  Signs of Covid-19 infection (temperature over 100 degrees, shortness of breath, cough, loss of taste/smell, generalized body aches, persistent headache, chills, sore throat, nausea/vomiting/diarrhea)  Your doctor is:       Dr. Kyara Cleaning, Orthopaedics: [phone]               Or dial [phone] and ask for the resident on call for:  Orthopaedics  For emergency care, call the:  [facility] [place] Emergency Department: [phone] (TTY for hearing impaired: [phone])                    Patient Name: Shashi Raza  MRN: [id number]  : 1960  Date of Surgery: 2022  Surgery: left index finger flexor tendon repair    CONTACT US AFTER SURGERY   If you do not have a postoperative appointment, please call the office the day after surgery to schedule a follow-up appointment in 10-14 days.    DRESSING   The operative site will be bandaged and/or splinted following your surgery.     Leave your dressing in place until your follow up visit.   Please keep your dressing clean and dry at all times.     You may shower tomorrow with your dressing covered with a plastic bag and sealed with tape to make it watertight.  A plastic trash bag works well, or a commercial cast protective bag can be purchased at most drug stores.    Do not make holes in your splint/cast.  Do not stick objects down it to scratch. To reduce irritation under the splint/cast, you can blow cool air from a fan or hair drying under cast.    DIET   Following surgery, start with clear liquids. Once you tolerate clear liquids, your diet may be progressed to your normal diet as tolerated.  Do not try to eat too much too soon.  This may result in nausea due to the narcotic pain medication and anesthesia. If you  feel nauseated, try clear liquids and crackers only.    Smoking can affect your healing and increase the risk of complications.  I strongly recommend that you do not smoke after your surgery.    Do not drink any alcohol (beer, wine, or spirits) for one week after surgery or while taking narcotic pain medicines.    MEDICATIONS   Pain medicine should be taken only  as needed  per your doctor's instructions to help control your pain. Initially, you may take the pills on regular 4 to 6 hour intervals without missing any doses. However, as your pain improves, you will be able to decrease the frequency and amount.     If a regional nerve block has been used for your anesthesia, it is common to experience numbness and tingling in the arm for twelve hours or longer after surgery.  In order to get  in-front of  the pain, take your narcotic pain medicine as soon as you start to feel the nerve block wear off.   You need to be as comfortable as possible, but also understand that no amount of pain medication, ice, elevation, and rest will completely eliminate pain after surgery. Non-steroidal anti-inflammatory over the counter medicines (Motrin, Advil, Aleve, Naprosyn, etc.) may be used to supplement your prescription narcotic pain medication. Because the pain prescription you were given contains acetaminophen (Tylenol), you should not take Tylenol or any medication containing acetaminophen (Tylenol) while you are taking the pain prescription.   Occasionally pain medicines may cause nausea if taken on an empty stomach. Narcotic pain medicines can also cause drowsiness, lightheadedness, itching and constipation.  Over the counter stool softeners and increased fluid intake can help relieve constipation.    Unless otherwise specified, you can resume taking all of the medicines you were taking routinely prior to surgery.    ACTIVITIES   WEIGHT BEARING STATUS: Non-Weight Bearing (NWB)   Keep your hand/wrist at or above the level of  your heart at all times for the next 3 days. This can be accomplished by using the foam pillow provided or other firm pillows.  A sling will not hold your hand/wrist above your heart and therefore is inadequate, it also may cause elbow and/or shoulder stiffness.   Use your hand as much as possible within the limits of your dressing and splint to decrease swelling and improve your overall surgical results UNLESS tendon, nerve, or phalangeal fracture repair was performed.   Move all joints of the extremity that are not immobilized (shoulder, elbow, forearm, wrist, digits) to minimize stiffness.  Hand exercises can be performed a few times daily unless otherwise specified.    Avoid all activities which may re-injure your hand or finger such as lifting objects heavier that a book, or rigorous physical activity.    You should not drive a car with your hand/arm in a splint or while taking narcotic pain medicines.    WHEN TO CONTACT YOUR DOCTOR   If you have a persistent temperature of 101.5 degrees Fahrenheit or greater.   If you develop any signs of wound infection- Increasing redness, swelling, pus-like drainage.   If you have uncontrollable nausea/vomiting post operatively.   If your fingers appear blue or cold.   If you have persistent bleeding through your dressing.   If you have progressively increased numbness or pain.   If your dressing feels too tight and painful.

## 2022-09-23 NOTE — ANESTHESIA PROCEDURE NOTES
Brachial plexus Procedure Note    Pre-Procedure   Staff -        Anesthesiologist:  Jayson Gonsalez DO       Performed By: anesthesiologist       Location: pre-op       Procedure Start/Stop Times: 9/23/2022 12:08 PM and 9/23/2022 12:12 PM       Pre-Anesthestic Checklist: patient identified, IV checked, site marked, risks and benefits discussed, informed consent, monitors and equipment checked, pre-op evaluation, at physician/surgeon's request and post-op pain management  Timeout:       Correct Patient: Yes        Correct Procedure: Yes        Correct Site: Yes        Correct Position: Yes        Correct Laterality: Yes        Site Marked: Yes  Procedure Documentation  Procedure: Brachial plexus       Diagnosis: POST OP PAIN       Laterality: left       Patient Position: sitting       Patient Prep/Sterile Barriers: sterile gloves, mask       Skin prep: Chloraprep (supraclavicular approach).       Needle Type: insulated       Needle Gauge: 21.        Needle Length (millimeters): 100        Ultrasound guided       1. Ultrasound was used to identify targeted nerve, plexus, vascular marker, or fascial plane and place a needle adjacent to it in real-time.       2. Ultrasound was used to visualize the spread of anesthetic in close proximity to the above referenced structure.       3. A permanent image is entered into the patient's record.    Assessment/Narrative         The placement was negative for: blood aspirated, painful injection and site bleeding       Paresthesias: No.       Bolus given via needle..        Secured via.        Insertion/Infusion Method: Single Shot       Complications: none    Medication(s) Administered   Bupivacaine 0.5% PF (Infiltration) - Infiltration   15 mL - 9/23/2022 12:08:00 PM  Mepivacaine 2% PF (Perineural) - Perineural   15 mL - 9/23/2022 12:08:00 PM  Medication Administration Time: 9/23/2022 12:08 PM     Comments:  Left supraclavicular nerve block

## 2022-09-23 NOTE — ANESTHESIA POSTPROCEDURE EVALUATION
Patient: Shashi Raza    Procedure: Procedure(s):  REPAIR, TENDON, FLEXOR AVULSION INDEX LEFT FINGER       Anesthesia Type:  MAC    Note:  Disposition: Outpatient   Postop Pain Control: Uneventful            Sign Out: Well controlled pain   PONV: No   Neuro/Psych: Uneventful            Sign Out: Acceptable/Baseline neuro status   Airway/Respiratory: Uneventful            Sign Out: Acceptable/Baseline resp. status   CV/Hemodynamics: Uneventful            Sign Out: Acceptable CV status; No obvious hypovolemia; No obvious fluid overload   Other NRE: NONE   DID A NON-ROUTINE EVENT OCCUR? No           Last vitals:  Vitals Value Taken Time   /76 09/23/22 1411   Temp 35.4  C (95.8  F) 09/23/22 1411   Pulse     Resp 15 09/23/22 1411   SpO2 97 % 09/23/22 1411       Electronically Signed By: Jayson Gonsalez DO  September 23, 2022  2:24 PM
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
## 2022-10-05 ENCOUNTER — OFFICE VISIT (OUTPATIENT)
Dept: ORTHOPEDICS | Facility: CLINIC | Age: 62
End: 2022-10-05
Payer: COMMERCIAL

## 2022-10-05 ENCOUNTER — THERAPY VISIT (OUTPATIENT)
Dept: OCCUPATIONAL THERAPY | Facility: CLINIC | Age: 62
End: 2022-10-05
Payer: COMMERCIAL

## 2022-10-05 DIAGNOSIS — M79.645 PAIN OF FINGER OF LEFT HAND: ICD-10-CM

## 2022-10-05 DIAGNOSIS — M25.642 STIFFNESS OF LEFT HAND JOINT: ICD-10-CM

## 2022-10-05 DIAGNOSIS — S61.211A LACERATION OF LEFT INDEX FINGER WITHOUT FOREIGN BODY WITHOUT DAMAGE TO NAIL, INITIAL ENCOUNTER: Primary | ICD-10-CM

## 2022-10-05 DIAGNOSIS — G63 POLYNEUROPATHY IN OTHER DISEASES CLASSIFIED ELSEWHERE (H): ICD-10-CM

## 2022-10-05 DIAGNOSIS — Z47.89 AFTERCARE FOLLOWING SURGERY OF THE MUSCULOSKELETAL SYSTEM: ICD-10-CM

## 2022-10-05 PROCEDURE — 97760 ORTHOTIC MGMT&TRAING 1ST ENC: CPT | Mod: GO | Performed by: OCCUPATIONAL THERAPIST

## 2022-10-05 PROCEDURE — 99024 POSTOP FOLLOW-UP VISIT: CPT | Performed by: STUDENT IN AN ORGANIZED HEALTH CARE EDUCATION/TRAINING PROGRAM

## 2022-10-05 PROCEDURE — 97110 THERAPEUTIC EXERCISES: CPT | Mod: GO | Performed by: OCCUPATIONAL THERAPIST

## 2022-10-05 PROCEDURE — 97165 OT EVAL LOW COMPLEX 30 MIN: CPT | Mod: GO | Performed by: OCCUPATIONAL THERAPIST

## 2022-10-05 NOTE — PROGRESS NOTES
Date of Injury: 9/14/2022  Mechanism of Injury:  laceration  Diagnosis: left index finger FDP laceration    Date of Surgery: 9/23/2022  Surgery: left index finger FDP repair in zone 1    Shashi Raza is a 61 year old male presenting for post-operative evaluation.     Interval: Doing well.  Pain well controlled.  Compliant with postoperative restrictions.    Physical Examination:  There were no vitals filed for this visit.  There is no height or weight on file to calculate BMI.    Well appearing, well nourished  Alert and oriented x 3, cooperative with exam     Left index finger  Incision clean/dry/intact  Sutures in place; button in place over dorsal nail  Minimal soft tissue swelling about the surgical site   No erythema/induration/fluctuance/drainage  Able to actively flex DIP and PIP; DIP held in 20 degrees of flexion passively  Sensation intact radial and ulnar IF  Vascular Exam: 2+ radial pulse, < 2 sec capillary refill    Pathology: Not applicable.    Imaging/Studies: no new imaging    Assessment: Shashi Raza is a 61 year old male s/p left index finger FDP repair in zone 1.    Plan:   I had a discussion with the patient regarding my clinical findings, diagnosis, and treatment plan. We reviewed the post-operative plan, frequency of follow-up, rehabilitation, and expected outcomes.  All questions answered.      NWB left upper extremity.    OK to wash hands/shower.  Do not submerge incision until 4 weeks from the date of surgery.    OT for thermoplastic splint, flexor tendon protocol    Next Visit:    Follow-up: 3 week(s).    Imaging: None.    Plan: Wound check, ROM check. Plan to remove button at postoperative week 8 (11/23/2022).    BA LO MD

## 2022-10-05 NOTE — NURSING NOTE
Reason For Visit:   Chief Complaint   Patient presents with     RECHECK     Post op follow up Left index finger flexor digitorum profundus (FDP) repair in zone 1, without graft  DOS:  9/23/22          Primary MD: Yoni Kong  Ref. MD: Alex    Age: 61 year old    ?  No      There were no vitals taken for this visit.      Pain Assessment  Patient Currently in Pain: Yes  0-10 Pain Scale: 3  Primary Pain Location: Finger (Comment which one) (left index finger)  Pain Descriptors: Discomfort    Hand Dominance Evaluation  Hand Dominance: Right          QuickDASH Assessment  QuickDASH Main 9/19/2022   1. Open a tight or new jar. Unable   2. Do heavy household chores (e.g., wash walls, floors) Unable   3. Carry a shopping bag or briefcase. Mild difficulty   4. Wash your back. Unable   5. Use a knife to cut food. Unable   6. Recreational activities in which you take some force or impact through your arm, shoulder or hand (e.g., golf, hammering, tennis, etc.). Unable   7. During the past week, to what extent has your arm, shoulder or hand problem interfered with your normal social activities with family, friends, neighbours or groups? Quite a bit   8. During the past week, were you limited in your work or other regular daily activities as a result of your arm, shoulder or hand problem? Very limited   9. Arm, shoulder or hand pain. Severe   10.Tingling (pins and needles) in your arm,shoulder or hand. Mild   11. During the past week, how much difficulty have you had sleeping because of the pain in your arm, shoulder or hand? Moderate difficulty   Quickdash Ability Score 75          Current Outpatient Medications   Medication Sig Dispense Refill     atorvastatin (LIPITOR) 20 MG tablet Take 1 tablet (20 mg) by mouth daily (Patient taking differently: Take 20 mg by mouth At Bedtime) 90 tablet 3     gabapentin (NEURONTIN) 300 MG capsule TAKE ONE CAPSULE BY MOUTH THREE TIMES A DAY (Patient taking differently: Take 300  mg by mouth 3 times daily as needed for neuropathic pain) 90 capsule 1     meloxicam (MOBIC) 15 MG tablet Take 1 tablet (15 mg) by mouth daily 60 tablet 1     methocarbamol (ROBAXIN) 500 MG tablet TAKE TWO TABLETS BY MOUTH THREE TIMES A DAY AS NEEDED FOR MUSCLE SPASMS (Patient taking differently: Take 1,000 mg by mouth 3 times daily as needed for muscle spasms) 90 tablet 3     predniSONE (DELTASONE) 20 MG tablet TAKE THREE TABLETS BY MOUTH ONCE DAILY FOR 3 DAYS, THEN TAKE TWO TABLETS BY MOUTH ONCE DAILY FOR 3 DAYS , THEN TAKE ONE TABLET BY MOUTH ONCE DAILY FOR 3 DAYS 18 tablet 1       Allergies   Allergen Reactions     Codeine      Doesn't recall       BJ COLLINS, ATC

## 2022-10-05 NOTE — PROGRESS NOTES
[facility]    OUTPATIENT Occupational Therapy ORTHOPEDIC EVALUATION  PLAN OF TREATMENT FOR OUTPATIENT REHABILITATION  (COMPLETE FOR INITIAL CLAIMS ONLY)  Patient's Last Name, First Name, M.I.  YOB: 1960  Shashi Raza    Provider s Name:  CLAUDIA [facility]   Medical Record No.  [id number]   Start of Care Date:  10/05/22   Onset Date:  09/23/2209/14/22   Type:     ___PT   _X__OT Medical Diagnosis:    Encounter Diagnoses   Name Primary?    Aftercare following surgery of the musculoskeletal system     Pain of finger of left hand     Stiffness of left hand joint         Treatment Diagnosis:  Left index finger zone I flexor tendon repair        Goals:     10/05/22 0500   Goal #1   Goal #1 tendon repair   Previous Performance Level Unable   Current Functional Task Verbalize   Current Performance Level Unable to verbalize flexor tendon precautions.   STG Target Performance Precautions   STG Target Perform Level Able to verbalize two flexor tendon precautions.   Due Date 10/05/22   Date Goal Met 10/05/22   LTG Target Task/Performance Independent following tendon precautions   Due Date 11/04/22   Goals #2   Goal #2 eating   Previous Performance Level Independent   Current Functional Task Hold   Current Performance Level Unable to hold a cup in left hand due to flexor tendon precautions.   STG Target Performance Drink from cup   STG Target Perform Level Able to wrap fingers around cup.   Due Date 11/16/22   LTG Target Task/Performance No Difficulty   Due date 11/30/22       Therapy Frequency:  2x/week for 4 weeks  Predicted Duration of Therapy Intervention:  tapering to 1x/week for 8 weeks    Josee Holly OT                 I CERTIFY THE NEED FOR THESE SERVICES FURNISHED UNDER        THIS PLAN OF TREATMENT AND WHILE UNDER MY CARE     (Physician attestation of this document  indicates review and certification of the therapy plan).                     Certification Date From:  10/05/22   Certification Date To:  12/28/22    Referring Provider:  Kyara Cleaning MD    Initial Assessment        See Epic Evaluation SOC Date: 10/05/22

## 2022-10-05 NOTE — LETTER
10/5/2022         RE: Shashi Raza  [address] [facility] [address]        Dear Colleague,    Thank you for referring your patient, Shashi Raza, to the [facility] [place]. Please see a copy of my visit note below.    Date of Injury: 9/14/2022  Mechanism of Injury:  laceration  Diagnosis: left index finger FDP laceration    Date of Surgery: 9/23/2022  Surgery: left index finger FDP repair in zone 1    Shashi Raza is a 61 year old male presenting for post-operative evaluation.     Interval: Doing well.  Pain well controlled.  Compliant with postoperative restrictions.    Physical Examination:  There were no vitals filed for this visit.  There is no height or weight on file to calculate BMI.    Well appearing, well nourished  Alert and oriented x 3, cooperative with exam     Left index finger  Incision clean/dry/intact  Sutures in place; button in place over dorsal nail  Minimal soft tissue swelling about the surgical site   No erythema/induration/fluctuance/drainage  Able to actively flex DIP and PIP; DIP held in 20 degrees of flexion passively  Sensation intact radial and ulnar IF  Vascular Exam: 2+ radial pulse, < 2 sec capillary refill    Pathology: Not applicable.    Imaging/Studies: no new imaging    Assessment: Shashi Raza is a 61 year old male s/p left index finger FDP repair in zone 1.    Plan:   I had a discussion with the patient regarding my clinical findings, diagnosis, and treatment plan. We reviewed the post-operative plan, frequency of follow-up, rehabilitation, and expected outcomes.  All questions answered.      NWB left upper extremity.    OK to wash hands/shower.  Do not submerge incision until 4 weeks from the date of surgery.    OT for thermoplastic splint, flexor tendon protocol    Next Visit:    Follow-up: 3 week(s).    Imaging: None.    Plan: Wound check, ROM check. Plan to remove button at postoperative week 8 (11/23/2022).    VENUS  MD TERESITA

## 2022-10-09 ENCOUNTER — HEALTH MAINTENANCE LETTER (OUTPATIENT)
Age: 62
End: 2022-10-09

## 2022-10-11 ENCOUNTER — HOSPITAL ENCOUNTER (OUTPATIENT)
Dept: OCCUPATIONAL THERAPY | Facility: CLINIC | Age: 62
Setting detail: THERAPIES SERIES
Discharge: HOME | End: 2022-10-11
Attending: ANESTHESIOLOGY
Payer: COMMERCIAL

## 2022-10-11 DIAGNOSIS — M79.645 PAIN OF FINGER OF LEFT HAND: ICD-10-CM

## 2022-10-11 DIAGNOSIS — M25.642 STIFFNESS OF LEFT HAND JOINT: ICD-10-CM

## 2022-10-11 DIAGNOSIS — Z47.89 AFTERCARE FOLLOWING SURGERY OF THE MUSCULOSKELETAL SYSTEM: Primary | ICD-10-CM

## 2022-10-11 PROCEDURE — 97140 MANUAL THERAPY 1/> REGIONS: CPT | Mod: GO | Performed by: OCCUPATIONAL THERAPIST

## 2022-10-11 PROCEDURE — 97535 SELF CARE MNGMENT TRAINING: CPT | Mod: GO | Performed by: OCCUPATIONAL THERAPIST

## 2022-10-11 PROCEDURE — 97110 THERAPEUTIC EXERCISES: CPT | Mod: GO | Performed by: OCCUPATIONAL THERAPIST

## 2022-10-26 ENCOUNTER — THERAPY VISIT (OUTPATIENT)
Dept: OCCUPATIONAL THERAPY | Facility: CLINIC | Age: 62
End: 2022-10-26
Payer: COMMERCIAL

## 2022-10-26 ENCOUNTER — OFFICE VISIT (OUTPATIENT)
Dept: ORTHOPEDICS | Facility: CLINIC | Age: 62
End: 2022-10-26
Payer: COMMERCIAL

## 2022-10-26 DIAGNOSIS — M25.642 STIFFNESS OF LEFT HAND JOINT: ICD-10-CM

## 2022-10-26 DIAGNOSIS — M79.645 PAIN OF FINGER OF LEFT HAND: ICD-10-CM

## 2022-10-26 DIAGNOSIS — S61.211A LACERATION OF LEFT INDEX FINGER WITHOUT FOREIGN BODY WITHOUT DAMAGE TO NAIL, INITIAL ENCOUNTER: Primary | ICD-10-CM

## 2022-10-26 DIAGNOSIS — Z47.89 AFTERCARE FOLLOWING SURGERY OF THE MUSCULOSKELETAL SYSTEM: Primary | ICD-10-CM

## 2022-10-26 PROCEDURE — 97763 ORTHC/PROSTC MGMT SBSQ ENC: CPT | Mod: GO | Performed by: OCCUPATIONAL THERAPIST

## 2022-10-26 PROCEDURE — 97140 MANUAL THERAPY 1/> REGIONS: CPT | Mod: GO | Performed by: OCCUPATIONAL THERAPIST

## 2022-10-26 PROCEDURE — 99024 POSTOP FOLLOW-UP VISIT: CPT | Performed by: STUDENT IN AN ORGANIZED HEALTH CARE EDUCATION/TRAINING PROGRAM

## 2022-10-26 PROCEDURE — 97110 THERAPEUTIC EXERCISES: CPT | Mod: GO | Performed by: OCCUPATIONAL THERAPIST

## 2022-10-26 NOTE — PROGRESS NOTES
SOAP note objective information for 10/26/2022.  Please refer to the daily flowsheet for treatment today, total treatment time and time spent performing 1:1 timed codes.     Diagnosis: Laceration of flexor tendon (flexor digitorum profundus) of the left index finger in zone 1  DOI: 9/14/2022   DOS: 9/23/2022  Procedure:  Left index finger flexor digitorum profundus (FDP) repair in zone 1, without graft  Post:  4w 5d  Referring physician: Kyara Cleaning MD    Precautions: Tendon repair, following modified Coronado protocol    Subjective:  Patient reports he was wearing his orthosis up until about three days ago. He started performing active finger flexion on his own since his last visit. He has been careful about not fully extending his index finger or making a tight fist. He denies feeling any pops in his index finger.    Objective:  Pain Level (Scale 0-10)   10/4/2022   At Rest 2-3/10   With Use NT     Pain Description  Date 10/4/2022   Location index finger   Pain Quality Aching and Sharp   Frequency Intermittent     Pain is worst Daytime   Exacerbated by  Finger motion   Relieved by Rest   Progression Unchanged     Wound/Scar  Healing appropriately, button in place.    Edema  Moderate edema of the index finger.     Sensation   WNL throughout all nerve distributions; per patient report.    ROM  Hand 10/5/2022 10/26/2022   AROM(PROM) Right Right   Index MP /(47) /70 (70)   PIP /(60) /74 (65)   DIP /(15) /15 (35)   CHANDRA       Strength  Contraindicated at this time.    Flexor Tendon Protocol: Modified Coronado Passive Motion Progression  Start Therapy Date: 10/5/2022             Zone I: consider Short Arc Motion with DIP flexion DBS [Plascencia]   Zone: 1   Edema control and PROM are essential to reduce friction on tendon   DBS (wrist 20, MCP 40-50 [total~70? flexion] with no dynamic flexion lines    0-3   wks: 1) PROM flex, active ext to DBS each joint, (manual blocking MCP for active IP ext in DBS) and composite finger flex,  10x every hour 2) strap fingers into DBS quinonez between exercises 3) do not remove DBS (In clinic only: therapist directed tenodesis)  3   wks: 1) PROM ex warm-up 2) within DBS, begin place and hold (consider targeting rather than place and hold)  4  wks: 1)PROM ex warm-up every 2 hrs x 15 reps each jt.  2) a) Active fist with wrist flex to wrist neutral/finger ext, b) neutral wrist with fist to finger extension (wrist neutral) ;c) active tendon glide: fist to claw to full finger extension.   5   wks: DC DBS; No resistive use of hand!  1) every hr x 25: a) Active fist w/ wrist flex to wrist ext/finger ext b) neutral/finger ext, c) active tendon glide: fist to claw to full finger extension; d) blocking to PIP and DIP hold 5 sec, 25 reps.    Therapy Dates:  10/5/2022 Post:  1w 5d Comments/Progression: Instructed to perform HEP every hour. Passive individual PIP and DIP joint flexion to all fingers. Composite finger flexion to all fingers. Wear orthosis full-time and perform exercises in orthosis. Instructed on flexor tendon precautions.   10/26/2022 Post:  4w 5d Comments/Progression: Patient reports Dr. Cleaning stated he can discontinue his orthosis. Therapist clarified with MD that she would prefer the patient continue to wear his orthosis, but if he does not, to be careful to not fully extend his finger. Fabricated finger-based dorsal block as patient is unlikely to wear forearm-based dorsal block. Encouraged patient to wear during activities requiring forceful  and to prevent full extension. Initiated active motion exercises including tenodesis, full fist to hook fist tendon glide, and targeting to initiate flexion at the DIP joint. Patient encouraged to continue passive motion exercises as a warm-up.

## 2022-10-26 NOTE — NURSING NOTE
Reason For Visit:   Chief Complaint   Patient presents with     Surgical Followup     4 wk POP flexor tendon laceration repair DOS: 9/23/22       Primary MD: Yoni Kong  Ref. MD: GUERLINE    Age: 61 year old    ?  No      There were no vitals taken for this visit.      Pain Assessment  Patient Currently in Pain: Yes  0-10 Pain Scale: 2  Primary Pain Location: Finger (Comment which one) (Left index finger)    Hand Dominance Evaluation  Hand Dominance: Right          QuickDASH Assessment  QuickDASH Main 9/19/2022   1. Open a tight or new jar. Unable   2. Do heavy household chores (e.g., wash walls, floors) Unable   3. Carry a shopping bag or briefcase. Mild difficulty   4. Wash your back. Unable   5. Use a knife to cut food. Unable   6. Recreational activities in which you take some force or impact through your arm, shoulder or hand (e.g., golf, hammering, tennis, etc.). Unable   7. During the past week, to what extent has your arm, shoulder or hand problem interfered with your normal social activities with family, friends, neighbours or groups? Quite a bit   8. During the past week, were you limited in your work or other regular daily activities as a result of your arm, shoulder or hand problem? Very limited   9. Arm, shoulder or hand pain. Severe   10.Tingling (pins and needles) in your arm,shoulder or hand. Mild   11. During the past week, how much difficulty have you had sleeping because of the pain in your arm, shoulder or hand? Moderate difficulty   Quickdash Ability Score 75          Current Outpatient Medications   Medication Sig Dispense Refill     atorvastatin (LIPITOR) 20 MG tablet Take 1 tablet (20 mg) by mouth daily (Patient taking differently: Take 20 mg by mouth At Bedtime) 90 tablet 3     gabapentin (NEURONTIN) 300 MG capsule TAKE ONE CAPSULE BY MOUTH THREE TIMES A DAY (Patient taking differently: Take 300 mg by mouth 3 times daily as needed for neuropathic pain) 90 capsule 1     meloxicam  (MOBIC) 15 MG tablet Take 1 tablet (15 mg) by mouth daily 60 tablet 1     methocarbamol (ROBAXIN) 500 MG tablet TAKE TWO TABLETS BY MOUTH THREE TIMES A DAY AS NEEDED FOR MUSCLE SPASMS (Patient taking differently: Take 1,000 mg by mouth 3 times daily as needed for muscle spasms) 90 tablet 3     predniSONE (DELTASONE) 20 MG tablet TAKE THREE TABLETS BY MOUTH ONCE DAILY FOR 3 DAYS, THEN TAKE TWO TABLETS BY MOUTH ONCE DAILY FOR 3 DAYS , THEN TAKE ONE TABLET BY MOUTH ONCE DAILY FOR 3 DAYS 18 tablet 1       Allergies   Allergen Reactions     Codeine      Doesn't recall       Quan Mobley

## 2022-10-26 NOTE — LETTER
10/26/2022         RE: Shashi Raza  [address] [facility] [address]        Dear Colleague,    Thank you for referring your patient, Shashi Raza, to the [facility] [place]. Please see a copy of my visit note below.    Date of Injury: 9/14/2022  Mechanism of Injury:  laceration  Diagnosis: left index finger FDP laceration    Date of Surgery: 9/23/2022  Surgery: left index finger FDP repair in zone 1    Shashi Raza is a 61 year old male presenting for post-operative evaluation.     Interval: Doing well.  Pain well controlled.  He has not been using his splint and is using his hands for all activities. He has not felt any pops in his finger and feels that he can move his finger well.    Physical Examination:  There were no vitals filed for this visit.  There is no height or weight on file to calculate BMI.    Well appearing, well nourished  Alert and oriented x 3, cooperative with exam     Left index finger  Incision clean/dry/intact  Sutures in place; button in place over dorsal nail  Minimal soft tissue swelling about the surgical site   No erythema/induration/fluctuance/drainage  Able to actively flex DIP and PIP; DIP held in 20 degrees of flexion passively  Sensation intact radial and ulnar IF  Vascular Exam: 2+ radial pulse, < 2 sec capillary refill    Pathology: Not applicable.    Imaging/Studies: no new imaging    Assessment: Shashi Raza is a 61 year old male s/p left index finger FDP repair in zone 1.    Plan:   I had a discussion with the patient regarding my clinical findings, diagnosis, and treatment plan. We reviewed the post-operative plan, frequency of follow-up, rehabilitation, and expected outcomes.  All questions answered.      NWB left upper extremity.    OK to wash hands/shower.  Do not submerge.    Continue flexor tendon protocol    Next Visit:    Follow-up: 4 week(s).    Imaging: None.    Plan: Wound check, ROM check. Plan to remove button  at postoperative week 8 (11/23/2022).    BA LO MD

## 2022-10-26 NOTE — PROGRESS NOTES
Date of Injury: 9/14/2022  Mechanism of Injury:  laceration  Diagnosis: left index finger FDP laceration    Date of Surgery: 9/23/2022  Surgery: left index finger FDP repair in zone 1    Shashi Raza is a 61 year old male presenting for post-operative evaluation.     Interval: Doing well.  Pain well controlled.  He has not been using his splint and is using his hands for all activities. He has not felt any pops in his finger and feels that he can move his finger well.    Physical Examination:  There were no vitals filed for this visit.  There is no height or weight on file to calculate BMI.    Well appearing, well nourished  Alert and oriented x 3, cooperative with exam     Left index finger  Incision clean/dry/intact  Sutures in place; button in place over dorsal nail  Minimal soft tissue swelling about the surgical site   No erythema/induration/fluctuance/drainage  Able to actively flex DIP and PIP; DIP held in 20 degrees of flexion passively  Sensation intact radial and ulnar IF  Vascular Exam: 2+ radial pulse, < 2 sec capillary refill    Pathology: Not applicable.    Imaging/Studies: no new imaging    Assessment: Shashi Raza is a 61 year old male s/p left index finger FDP repair in zone 1.    Plan:   I had a discussion with the patient regarding my clinical findings, diagnosis, and treatment plan. We reviewed the post-operative plan, frequency of follow-up, rehabilitation, and expected outcomes.  All questions answered.      NWB left upper extremity.    OK to wash hands/shower.  Do not submerge.    Continue flexor tendon protocol    Next Visit:    Follow-up: 4 week(s).    Imaging: None.    Plan: Wound check, ROM check. Plan to remove button at postoperative week 8 (11/23/2022).    BA LO MD

## 2022-11-14 ENCOUNTER — OFFICE VISIT (OUTPATIENT)
Dept: FAMILY MEDICINE | Facility: CLINIC | Age: 62
End: 2022-11-14
Payer: COMMERCIAL

## 2022-11-14 VITALS
TEMPERATURE: 98.3 F | BODY MASS INDEX: 29.56 KG/M2 | OXYGEN SATURATION: 98 % | SYSTOLIC BLOOD PRESSURE: 128 MMHG | HEART RATE: 90 BPM | DIASTOLIC BLOOD PRESSURE: 74 MMHG | WEIGHT: 206 LBS

## 2022-11-14 DIAGNOSIS — G44.219 EPISODIC TENSION-TYPE HEADACHE, NOT INTRACTABLE: ICD-10-CM

## 2022-11-14 DIAGNOSIS — M54.2 CERVICALGIA: Primary | ICD-10-CM

## 2022-11-14 DIAGNOSIS — Z23 NEED FOR PROPHYLACTIC VACCINATION AND INOCULATION AGAINST INFLUENZA: ICD-10-CM

## 2022-11-14 DIAGNOSIS — Z12.11 COLON CANCER SCREENING: ICD-10-CM

## 2022-11-14 PROCEDURE — 99213 OFFICE O/P EST LOW 20 MIN: CPT | Mod: 25 | Performed by: FAMILY MEDICINE

## 2022-11-14 PROCEDURE — 90682 RIV4 VACC RECOMBINANT DNA IM: CPT | Performed by: FAMILY MEDICINE

## 2022-11-14 PROCEDURE — G0008 ADMIN INFLUENZA VIRUS VAC: HCPCS | Performed by: FAMILY MEDICINE

## 2022-11-14 ASSESSMENT — PAIN SCALES - GENERAL: PAINLEVEL: MILD PAIN (2)

## 2022-11-14 NOTE — PROGRESS NOTES
"  Assessment & Plan     Cervicalgia  Continues to have ongoing and increasing difficulty.  Cannot really pick things up pinching things anymore.  Causing headaches as well.  We are going to put in a referral to neurosurgery again.  - Neurosurgery Referral; Future    Episodic tension-type headache, not intractable  See discussion above.  He needs follow-up with neurosurgery.  Continues to take Neurontin Robaxin and meloxicam.  Takes tapering dose of prednisone when things worsen.  Filled out disability form for him and he is unable to work and has not been working for years.  - Neurosurgery Referral; Future    Colon cancer screening  Needs screening.  - Colonoscopy Screening  Referral; Future    Need for prophylactic vaccination and inoculation against influenza                 BMI:   Estimated body mass index is 29.56 kg/m  as calculated from the following:    Height as of 9/23/22: 1.778 m (5' 10\").    Weight as of this encounter: 93.4 kg (206 lb).           No follow-ups on file.    Yoni Kong MD  [facility] KANDACE Danielle is a 61 year old, presenting for the following health issues:  Headache (Follow up)  See discussion above.  In for follow-up of his chronic cervalgia and radiculopathy.  Needs form filled out that he is disabled and unable to do his job.    HPI           Review of Systems   Constitutional, HEENT, cardiovascular, pulmonary, gi and gu systems are negative, except as otherwise noted.      Objective    /74   Pulse 90   Temp 98.3  F (36.8  C) (Temporal)   Wt 93.4 kg (206 lb)   SpO2 98%   BMI 29.56 kg/m    There is no height or weight on file to calculate BMI.  Physical Exam   GENERAL: healthy, alert and no distress  NECK: no adenopathy and no asymmetry, masses, or scars  RESP: lungs clear to auscultation - no rales, rhonchi or wheezes  CV: regular rate and rhythm, normal S1 S2, no S3 or S4, no murmur, click or rub, no peripheral edema and " peripheral pulses strong  MS: no gross musculoskeletal defects noted, no edema  NEURO: Weakness in his  strength bilaterally.  PSYCH: mentation appears normal, affect normal/bright

## 2022-11-14 NOTE — PROGRESS NOTES
Prior to immunization administration, verified patients identity using patient s name and date of birth. Please see Immunization Activity for additional information.     Screening Questionnaire for Adult Immunization    Are you sick today?   No   Do you have allergies to medications, food, a vaccine component or latex?   Yes   Have you ever had a serious reaction after receiving a vaccination?   No   Do you have a long-term health problem with heart, lung, kidney, or metabolic disease (e.g., diabetes), asthma, a blood disorder, no spleen, complement component deficiency, a cochlear implant, or a spinal fluid leak?  Are you on long-term aspirin therapy?   No   Do you have cancer, leukemia, HIV/AIDS, or any other immune system problem?   No   Do you have a parent, brother, or sister with an immune system problem?   No   In the past 3 months, have you taken medications that affect  your immune system, such as prednisone, other steroids, or anticancer drugs; drugs for the treatment of rheumatoid arthritis, Crohn s disease, or psoriasis; or have you had radiation treatments?   No   Have you had a seizure, or a brain or other nervous system problem?   No   During the past year, have you received a transfusion of blood or blood    products, or been given immune (gamma) globulin or antiviral drug?   No   For women: Are you pregnant or is there a chance you could become       pregnant during the next month?   No   Have you received any vaccinations in the past 4 weeks?   No     Immunization questionnaire was positive for at least one answer.  Notified Yes.        Per orders of Dr. Kong, injection of flublok given by Jany Kong CMA. Patient instructed to remain in clinic for 15 minutes afterwards, and to report any adverse reaction to me immediately.       Screening performed by Jany Kong CMA on 11/14/2022 at 3:43 PM.

## 2022-11-16 ENCOUNTER — TELEPHONE (OUTPATIENT)
Dept: GASTROENTEROLOGY | Facility: CLINIC | Age: 62
End: 2022-11-16

## 2022-11-16 NOTE — TELEPHONE ENCOUNTER
Screening Questions  BLUE  KIND OF PREP RED  LOCATION [review exclusion criteria] GREEN  SEDATION TYPE        Y Are you active on mychart?       Yoni Kong MD in [facility] Ordering/Referring Provider?        [facility] What type of coverage do you have?      N Have you had a positive covid test in the last 90 days?     28.7 1. BMI  [BMI 40+ - review exclusion criteria]    Y  2. Are you able to give consent for your medical care? [IF NO,RN REVIEW]        N  3. Are you taking any prescription pain medications on a routine schedule?        3a. EXTENDED PREP What kind of prescription?     N 4. Do you have any chemical dependencies such as alcohol, street drugs, or methadone?    N 5. Do you have any history of post-traumatic stress syndrome, severe anxiety or history of psychosis?      **If yes 3- 5 , please schedule with MAC sedation.**          IF YES TO ANY 6 - 10 - HOSPITAL SETTING ONLY.     N 6.   Do you need assistance transferring?     N 7.   Have you had a heart or lung transplant?    N 8.   Are you currently on dialysis?   N 9.   Do you use daily home oxygen?   N 10. Do you take nitroglycerin?   10a.  If yes, how often?     11. [FEMALES]   Are you currently pregnant?    11a.  If yes, how many weeks? [ Greater than 12 weeks, OR NEEDED]    N 12. Do you have Pulmonary Hypertension? *NEED PAC APPT AT [facility]*     N 13. [review exclusion criteria]  Do you have any implantable devices in your body (pacemaker, defib, LVAD)?    N 14. In the past 6 months, have you had any heart related issues including cardiomyopathy or heart attack?     14a.  If yes, did it require cardiac stenting if so when?     N 15. Have you had a stroke or Transient ischemic attack (TIA - aka  mini stroke ) within 6 months?      N 16. Do you have mod to severe Obstructive Sleep Apnea?  [Hospital only - Ok at [place]]    N 17. Do you have SEVERE AND UNCONTROLLED asthma? *NEED PAC APPT AT [facility]*     N 18. Are you currently taking any  "blood thinners?     18a. If yes, inform patient to \"follow up w/ ordering provider for bridging instructions.\"    N 19. Do you take the medication Phentermine?    19a. If yes, \"Hold for 7 days before procedure.  Please consult your prescribing provider if you have questions about holding this medication.\"     N  20. Do you have chronic kidney disease?      N  21. Do you have a diagnosis of diabetes?     N  22. On a regular basis do you go 3-5 days between bowel movements?      23. Preferred LOCAL Pharmacy for Pre Prescription    [ LIST ONLY ONE PHARMACY]     [place], [place] - [address] [place] [address] DR        - CLOSING REMINDERS -    Informed patient they will need an adult    Cannot take any type of public or medical transportation alone    Conscious Sedation- Needs  for 6 hours after the procedure       MAC/General-Needs  for 24 hours after procedure    Pre-Procedure Covid test to be completed [[facility] PCR Testing Required]    Confirmed Nurse will call to complete assessment       - SCHEDULING DETAILS -     Abhilash  Surgeon    01/24/23  Date of Procedure  Lower Endoscopy [Colonoscopy]  Type of Procedure Scheduled  [facility]-If you answer yes to questions #8, #20, #21Which Colonoscopy Prep was Sent?     MAC Sedation Type     N PAC / Pre-op Required         Additional comments:            "

## 2022-11-22 ENCOUNTER — OFFICE VISIT (OUTPATIENT)
Dept: ORTHOPEDICS | Facility: CLINIC | Age: 62
End: 2022-11-22
Payer: COMMERCIAL

## 2022-11-22 DIAGNOSIS — S56.129A FLEXOR TENDON LACERATION OF FINGER WITH OPEN WOUND, INITIAL ENCOUNTER: Primary | ICD-10-CM

## 2022-11-22 DIAGNOSIS — S61.209A FLEXOR TENDON LACERATION OF FINGER WITH OPEN WOUND, INITIAL ENCOUNTER: Primary | ICD-10-CM

## 2022-11-22 PROCEDURE — 99024 POSTOP FOLLOW-UP VISIT: CPT | Performed by: STUDENT IN AN ORGANIZED HEALTH CARE EDUCATION/TRAINING PROGRAM

## 2022-11-22 RX ORDER — SULFAMETHOXAZOLE/TRIMETHOPRIM 800-160 MG
1 TABLET ORAL 2 TIMES DAILY
Qty: 10 TABLET | Refills: 0 | Status: ON HOLD | OUTPATIENT
Start: 2022-11-22 | End: 2022-12-14

## 2022-11-22 NOTE — NURSING NOTE
Reason For Visit:   Chief Complaint   Patient presents with     Surgical Followup     2 mo POP left index flexor tendon laceration repair DOS: 9/23/22       Primary MD: Yoni Kong  Ref. MD: GUERLINE    Age: 61 year old    ?  No      There were no vitals taken for this visit.      Pain Assessment  Patient Currently in Pain: Yes  0-10 Pain Scale: 2  Primary Pain Location: Finger (Comment which one) (Left index finger)    Hand Dominance Evaluation  Hand Dominance: Right          QuickDASH Assessment  QuickDASH Main 11/22/2022   1. Open a tight or new jar. No difficulty   2. Do heavy household chores (e.g., wash walls, floors) Moderate difficulty   3. Carry a shopping bag or briefcase. Moderate difficulty   4. Wash your back. Mild difficulty   5. Use a knife to cut food. No difficulty   6. Recreational activities in which you take some force or impact through your arm, shoulder or hand (e.g., golf, hammering, tennis, etc.). Moderate difficulty   7. During the past week, to what extent has your arm, shoulder or hand problem interfered with your normal social activities with family, friends, neighbours or groups? Slightly   8. During the past week, were you limited in your work or other regular daily activities as a result of your arm, shoulder or hand problem? Very limited   9. Arm, shoulder or hand pain. Mild   10.Tingling (pins and needles) in your arm,shoulder or hand. Severe   11. During the past week, how much difficulty have you had sleeping because of the pain in your arm, shoulder or hand? Moderate difficulty   Quickdash Ability Score 38.64          Current Outpatient Medications   Medication Sig Dispense Refill     atorvastatin (LIPITOR) 20 MG tablet Take 1 tablet (20 mg) by mouth daily (Patient taking differently: Take 20 mg by mouth At Bedtime) 90 tablet 3     gabapentin (NEURONTIN) 300 MG capsule TAKE ONE CAPSULE BY MOUTH THREE TIMES A DAY (Patient taking differently: Take 300 mg by mouth 3 times  daily as needed for neuropathic pain) 90 capsule 1     meloxicam (MOBIC) 15 MG tablet Take 1 tablet (15 mg) by mouth daily 60 tablet 1     methocarbamol (ROBAXIN) 500 MG tablet TAKE TWO TABLETS BY MOUTH THREE TIMES A DAY AS NEEDED FOR MUSCLE SPASMS (Patient taking differently: Take 1,000 mg by mouth 3 times daily as needed for muscle spasms) 90 tablet 3     predniSONE (DELTASONE) 20 MG tablet TAKE THREE TABLETS BY MOUTH ONCE DAILY FOR 3 DAYS, THEN TAKE TWO TABLETS BY MOUTH ONCE DAILY FOR 3 DAYS , THEN TAKE ONE TABLET BY MOUTH ONCE DAILY FOR 3 DAYS 18 tablet 1       Allergies   Allergen Reactions     Codeine      Doesn't recall       Quan Mobley, AEMT

## 2022-11-22 NOTE — PROGRESS NOTES
Date of Injury: 9/14/2022  Mechanism of Injury:  laceration  Diagnosis: left index finger FDP laceration    Date of Surgery: 9/23/2022  Surgery: left index finger FDP repair in zone 1    Shashi Raza is a 61 year old male presenting for post-operative evaluation.     Interval: Doing well.  Pain well controlled.  He has not been using his splint and is using his hands for all activities. He has not felt any pops in his finger and feels that he can move his finger well.    Physical Examination:  There were no vitals filed for this visit.  There is no height or weight on file to calculate BMI.    Well appearing, well nourished  Alert and oriented x 3, cooperative with exam     Left index finger  Incision clean/dry/intact  Sutures in place; button in place over dorsal nail -- button removed today. 1 drop of purulence expressed from under the nail, no surrounding erythema  Minimal soft tissue swelling about the surgical site   No erythema/induration/fluctuance/drainage about volar incision  Able to actively flex DIP and PIP; DIP held in 20 degrees of flexion passively  Sensation intact radial and ulnar IF  Vascular Exam: 2+ radial pulse, < 2 sec capillary refill    Pathology: Not applicable.    Imaging/Studies: no new imaging    Assessment: Shashi Raza is a 61 year old male s/p left index finger FDP repair in zone 1.    Plan:   I had a discussion with the patient regarding my clinical findings, diagnosis, and treatment plan. We reviewed the post-operative plan, frequency of follow-up, rehabilitation, and expected outcomes.  All questions answered.      NWB left upper extremity.    OK to wash hands/shower.  Do not submerge.    Continue flexor tendon protocol  -  Bactrim prescribed for purulence encountered under fingernail    Next Visit:    Follow-up: 3 week(s).    Imaging: None.    Plan: Wound check, ROM check.     BA LO MD

## 2022-11-22 NOTE — LETTER
11/22/2022         RE: Shashi Raza  [address] [facility] [address]        Dear Colleague,    Thank you for referring your patient, Shashi Raza, to the [facility] [place]. Please see a copy of my visit note below.    Date of Injury: 9/14/2022  Mechanism of Injury:  laceration  Diagnosis: left index finger FDP laceration    Date of Surgery: 9/23/2022  Surgery: left index finger FDP repair in zone 1    Shashi Raza is a 61 year old male presenting for post-operative evaluation.     Interval: Doing well.  Pain well controlled.  He has not been using his splint and is using his hands for all activities. He has not felt any pops in his finger and feels that he can move his finger well.    Physical Examination:  There were no vitals filed for this visit.  There is no height or weight on file to calculate BMI.    Well appearing, well nourished  Alert and oriented x 3, cooperative with exam     Left index finger  Incision clean/dry/intact  Sutures in place; button in place over dorsal nail -- button removed today. 1 drop of purulence expressed from under the nail, no surrounding erythema  Minimal soft tissue swelling about the surgical site   No erythema/induration/fluctuance/drainage  Able to actively flex DIP and PIP; DIP held in 20 degrees of flexion passively  Sensation intact radial and ulnar IF  Vascular Exam: 2+ radial pulse, < 2 sec capillary refill    Pathology: Not applicable.    Imaging/Studies: no new imaging    Assessment: Shashi Raza is a 61 year old male s/p left index finger FDP repair in zone 1.    Plan:   I had a discussion with the patient regarding my clinical findings, diagnosis, and treatment plan. We reviewed the post-operative plan, frequency of follow-up, rehabilitation, and expected outcomes.  All questions answered.      NWB left upper extremity.    OK to wash hands/shower.  Do not submerge.    Continue flexor tendon protocol  -  Bactrim  prescribed for purulence encountered under fingernail    Next Visit:    Follow-up: 3 week(s).    Imaging: None.    Plan: Wound check, ROM check.     BA LO MD

## 2022-12-12 ENCOUNTER — NURSE TRIAGE (OUTPATIENT)
Dept: FAMILY MEDICINE | Facility: CLINIC | Age: 62
End: 2022-12-12

## 2022-12-12 ENCOUNTER — TELEPHONE (OUTPATIENT)
Dept: ORTHOPEDICS | Facility: CLINIC | Age: 62
End: 2022-12-12

## 2022-12-12 NOTE — H&P (VIEW-ONLY)
Date of Injury: 9/14/2022  Mechanism of Injury:  laceration  Diagnosis: left index finger FDP laceration; surgical site infection with osteomyelitis of P3 and abscess    Date of Surgery: 9/23/2022  Surgery: left index finger FDP repair in zone 1    Shashi Raza is a 61 year old male presenting for post-operative evaluation on a video visit.    Interval: reports finger swelling and redness that began 5-6 days ago, purulent drainage that began 3-4 days ago, with no fever or chills.     Physical Examination:  There were no vitals filed for this visit.  There is no height or weight on file to calculate BMI.    Limited by video visit    Left index finger  Redness and swelling about finger  Purulent drainage from volar fingertip      Imaging/Studies:   XR left index finger obtained 12/13/2022 with lucency in P3 consistent with osteomyelitis    Assessment: Shashi Raza is a 61 year old male s/p left index finger FDP repair in zone 1 complicated by surgical site infection and P3 osteomyelitis    Plan:   I had a discussion with the patient regarding my clinical findings, diagnosis, and treatment plan.  He has evidence of a surgical site infection with distal phalanx osteomyelitis.  His CRP is elevated.  His radiographs show a lucency concerning for osteomyelitis.  Therefore I recommend urgent surgical debridement and irrigation with possible long-term IV antibiotics.  We reviewed the risks and benefits associated with surgery.  The risks of surgery include but are not limited to further infection, bleeding, nerve injury, surgical scar, postoperative stiffness, anesthetic complications, and need for further surgery including amputation.  All questions answered.      NWB left upper extremity.    Plan for surgical irrigation and debridement, nail plate removal, bone debridement tomorrow.  Infectious disease consult as inpatient tomorrow    BA LO MD

## 2022-12-12 NOTE — PROGRESS NOTES
Date of Injury: 9/14/2022  Mechanism of Injury:  laceration  Diagnosis: left index finger FDP laceration; surgical site infection with osteomyelitis of P3 and abscess    Date of Surgery: 9/23/2022  Surgery: left index finger FDP repair in zone 1    Shashi Raza is a 61 year old male presenting for post-operative evaluation on a video visit.    Interval: reports finger swelling and redness that began 5-6 days ago, purulent drainage that began 3-4 days ago, with no fever or chills.     Physical Examination:  There were no vitals filed for this visit.  There is no height or weight on file to calculate BMI.    Limited by video visit    Left index finger  Redness and swelling about finger  Purulent drainage from volar fingertip      Imaging/Studies:   XR left index finger obtained 12/13/2022 with lucency in P3 consistent with osteomyelitis    Assessment: Shashi Rzaa is a 61 year old male s/p left index finger FDP repair in zone 1 complicated by surgical site infection and P3 osteomyelitis    Plan:   I had a discussion with the patient regarding my clinical findings, diagnosis, and treatment plan.  He has evidence of a surgical site infection with distal phalanx osteomyelitis.  His CRP is elevated.  His radiographs show a lucency concerning for osteomyelitis.  Therefore I recommend urgent surgical debridement and irrigation with possible long-term IV antibiotics.  We reviewed the risks and benefits associated with surgery.  The risks of surgery include but are not limited to further infection, bleeding, nerve injury, surgical scar, postoperative stiffness, anesthetic complications, and need for further surgery including amputation.  All questions answered.      NWB left upper extremity.    Plan for surgical irrigation and debridement, nail plate removal, bone debridement tomorrow.  Infectious disease consult as inpatient tomorrow    BA LO MD
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
## 2022-12-12 NOTE — TELEPHONE ENCOUNTER
[facility] Call Center    Phone Message    May a detailed message be left on voicemail: yes     Reason for Call: Other: Patient has an infection in his finger and needs a call back asap to further discuss what to do      Action Taken: Message routed to:  [facility] ortho     Travel Screening: Not Applicable     Patient wants us to call his wife back @ [phone] because his phone is dead --

## 2022-12-12 NOTE — TELEPHONE ENCOUNTER
Patient calling regarding an infection in L index finger from surgery on 11/22. He stated he had an infection a few weeks ago with pus and was put on an antibiotic. That did help to clear the infection up but then infection returned. Denies any other symptoms.    Patient is traveling back by plane today and is requesting either another antibiotic or if needs to be seen to be in [place]. He would like you to call wifes phone and leave a message if needed since phone will be in airplane mode.     Would provider like another antibiotic sent or advise patient be seen either in clinic or in ?    SAV RicardoN, RN       Reason for Disposition    Pus or cloudy fluid draining from wound    Additional Information    Negative: Stitches and not infected    Negative: Surgical wound infection suspected (post-op)    Negative: Bright red, wide-spread, sunburn-like rash    Negative: Black (necrotic) or blisters develop in wound    Negative: Looks infected (spreading redness, red streak, pus) and fever    Negative: Patient sounds very sick or weak to the triager    Negative: Severe pain in the wound    Negative: Red streak runs from the wound    Negative: Facial wound looks infected (spreading redness)    Negative: Finger wound and entire finger swollen    Protocols used: WOUND INFECTION-A-OH

## 2022-12-12 NOTE — TELEPHONE ENCOUNTER
Yoni Kong MD sent to NATALIE Steele  Caller: Unspecified (Today,  9:22 AM)  This is post operative infection treated by his surgeon 2 weeks ago. Call his wife and have her contact their office with this news and what they want him to do.

## 2022-12-13 ENCOUNTER — HOSPITAL ENCOUNTER (OUTPATIENT)
Dept: GENERAL RADIOLOGY | Facility: CLINIC | Age: 62
Discharge: HOME | End: 2022-12-13
Attending: STUDENT IN AN ORGANIZED HEALTH CARE EDUCATION/TRAINING PROGRAM | Admitting: STUDENT IN AN ORGANIZED HEALTH CARE EDUCATION/TRAINING PROGRAM
Payer: COMMERCIAL

## 2022-12-13 ENCOUNTER — VIRTUAL VISIT (OUTPATIENT)
Dept: ORTHOPEDICS | Facility: CLINIC | Age: 62
End: 2022-12-13
Payer: COMMERCIAL

## 2022-12-13 ENCOUNTER — LAB (OUTPATIENT)
Dept: LAB | Facility: CLINIC | Age: 62
End: 2022-12-13
Payer: COMMERCIAL

## 2022-12-13 DIAGNOSIS — Z11.59 NEED FOR HEPATITIS C SCREENING TEST: ICD-10-CM

## 2022-12-13 DIAGNOSIS — S61.211A LACERATION OF LEFT INDEX FINGER WITHOUT FOREIGN BODY WITHOUT DAMAGE TO NAIL, INITIAL ENCOUNTER: Primary | ICD-10-CM

## 2022-12-13 DIAGNOSIS — S61.209A FLEXOR TENDON LACERATION OF FINGER WITH OPEN WOUND, INITIAL ENCOUNTER: Primary | ICD-10-CM

## 2022-12-13 DIAGNOSIS — S56.129A FLEXOR TENDON LACERATION OF FINGER WITH OPEN WOUND, INITIAL ENCOUNTER: Primary | ICD-10-CM

## 2022-12-13 DIAGNOSIS — S61.211A LACERATION OF LEFT INDEX FINGER WITHOUT FOREIGN BODY WITHOUT DAMAGE TO NAIL, INITIAL ENCOUNTER: ICD-10-CM

## 2022-12-13 DIAGNOSIS — Z11.4 SCREENING FOR HIV (HUMAN IMMUNODEFICIENCY VIRUS): ICD-10-CM

## 2022-12-13 DIAGNOSIS — M48.062 SPINAL STENOSIS OF LUMBAR REGION WITH NEUROGENIC CLAUDICATION: ICD-10-CM

## 2022-12-13 DIAGNOSIS — T81.49XA SURGICAL SITE INFECTION: Primary | ICD-10-CM

## 2022-12-13 LAB
BASOPHILS # BLD AUTO: 0 10E3/UL (ref 0–0.2)
BASOPHILS NFR BLD AUTO: 1 %
CRP SERPL-MCNC: 10.6 MG/L (ref 0–8)
EOSINOPHIL # BLD AUTO: 0.2 10E3/UL (ref 0–0.7)
EOSINOPHIL NFR BLD AUTO: 3 %
ERYTHROCYTE [DISTWIDTH] IN BLOOD BY AUTOMATED COUNT: 12.8 % (ref 10–15)
HCT VFR BLD AUTO: 43.7 % (ref 40–53)
HCV AB SERPL QL IA: NONREACTIVE
HGB BLD-MCNC: 14.8 G/DL (ref 13.3–17.7)
HIV 1+2 AB+HIV1 P24 AG SERPL QL IA: NONREACTIVE
IMM GRANULOCYTES # BLD: 0 10E3/UL
IMM GRANULOCYTES NFR BLD: 0 %
LYMPHOCYTES # BLD AUTO: 1.7 10E3/UL (ref 0.8–5.3)
LYMPHOCYTES NFR BLD AUTO: 30 %
MCH RBC QN AUTO: 30.5 PG (ref 26.5–33)
MCHC RBC AUTO-ENTMCNC: 33.9 G/DL (ref 31.5–36.5)
MCV RBC AUTO: 90 FL (ref 78–100)
MONOCYTES # BLD AUTO: 0.5 10E3/UL (ref 0–1.3)
MONOCYTES NFR BLD AUTO: 9 %
NEUTROPHILS # BLD AUTO: 3.2 10E3/UL (ref 1.6–8.3)
NEUTROPHILS NFR BLD AUTO: 57 %
NRBC # BLD AUTO: 0 10E3/UL
NRBC BLD AUTO-RTO: 0 /100
PLATELET # BLD AUTO: 205 10E3/UL (ref 150–450)
RBC # BLD AUTO: 4.85 10E6/UL (ref 4.4–5.9)
WBC # BLD AUTO: 5.6 10E3/UL (ref 4–11)

## 2022-12-13 PROCEDURE — 36415 COLL VENOUS BLD VENIPUNCTURE: CPT

## 2022-12-13 PROCEDURE — 86140 C-REACTIVE PROTEIN: CPT

## 2022-12-13 PROCEDURE — 73140 X-RAY EXAM OF FINGER(S): CPT | Mod: LT

## 2022-12-13 PROCEDURE — 99024 POSTOP FOLLOW-UP VISIT: CPT | Performed by: STUDENT IN AN ORGANIZED HEALTH CARE EDUCATION/TRAINING PROGRAM

## 2022-12-13 PROCEDURE — 87389 HIV-1 AG W/HIV-1&-2 AB AG IA: CPT

## 2022-12-13 PROCEDURE — 85025 COMPLETE CBC W/AUTO DIFF WBC: CPT

## 2022-12-13 PROCEDURE — 86803 HEPATITIS C AB TEST: CPT

## 2022-12-13 NOTE — LETTER
12/13/2022         RE: Shashi Raza  [address]  [facility] [address]        Dear Colleague,    Thank you for referring your patient, Shashi Raza, to the [facility] [place]. Please see a copy of my visit note below.    Date of Injury: 9/14/2022  Mechanism of Injury:  laceration  Diagnosis: left index finger FDP laceration; surgical site infection with osteomyelitis of P3 and abscess    Date of Surgery: 9/23/2022  Surgery: left index finger FDP repair in zone 1    Shashi Raza is a 61 year old male presenting for post-operative evaluation on a video visit.    Interval: reports finger swelling and redness that began 5-6 days ago, purulent drainage that began 3-4 days ago, with no fever or chills.     Physical Examination:  There were no vitals filed for this visit.  There is no height or weight on file to calculate BMI.    Limited by video visit    Left index finger  Redness and swelling about finger  Purulent drainage from volar fingertip      Imaging/Studies:   XR left index finger obtained 12/13/2022 with lucency in P3 consistent with osteomyelitis    Assessment: Shashi Raza is a 61 year old male s/p left index finger FDP repair in zone 1 complicated by surgical site infection and P3 osteomyelitis    Plan:   I had a discussion with the patient regarding my clinical findings, diagnosis, and treatment plan.  He has evidence of a surgical site infection with distal phalanx osteomyelitis.  His CRP is elevated.  His radiographs show a lucency concerning for osteomyelitis.  Therefore I recommend urgent surgical debridement and irrigation with possible long-term IV antibiotics.  We reviewed the risks and benefits associated with surgery.  The risks of surgery include but are not limited to further infection, bleeding, nerve injury, surgical scar, postoperative stiffness, anesthetic complications, and need for further surgery including amputation.  All questions  answered.      NWB left upper extremity.    Plan for surgical irrigation and debridement, nail plate removal, bone debridement tomorrow.  Infectious disease consult as inpatient tomorrow    BA LO MD

## 2022-12-13 NOTE — OR NURSING
RE: Plan for pre-op H&P for surgery tomorrow?  Received: Today  Lauren Anaya RN Johnson, Judy, RN  Cc: Tracy Wesley RN; P [facility] Surgery Scheduling Pool-Ump; Kyara Cleaning MD  Hi all,     This surgery planning happened urgently today.  The plan will be for Dr Cleaning to do the H&P on the DOS tomorrow.     Thank you,   Lauren SALAZAR RN           Previous Messages     ----- Message -----   From: Ranjana Kong RN   Sent: 12/13/2022   2:08 PM CST   To: Lauren Anaya RN, Tracy Wesley RN, *   Subject: Plan for pre-op H&P for surgery tomorrow?         Hello,     It looks like pt was added on to the OR schedule at the [facility] tomorrow with Dr Cleaning.     I could not reach the pt for his pre-op call. Do you know what the plan for pt's pre-op H&P is?     If he did not have one done within 30 days, pt will need an H&P on DOS by the surgeon.     Thanks so much.     Ranjana Kong, RN, BSN   Pre-Anesthesia Screening   [phone] Office

## 2022-12-13 NOTE — PROGRESS NOTES
Dr Cleaning requesting CBC and CRP labwork and finger XR prior to virtual visit today. Lauren Anaya RN

## 2022-12-13 NOTE — NURSING NOTE
Spoke with patient briefly re: surgery location, arrival time, fasting and transportation to hospital.  Sent patient a detailed My Chart message with information.  Patient will contact RN with any questions. Lauren Anaya RN

## 2022-12-13 NOTE — TELEPHONE ENCOUNTER
Please see mychart encounter from 12/12/22. Patient was able to discuss symptoms with Dr. Jimenez and photos were sent for surgeon's review. Patient had a virtual visit with provider this AM as well and a referral for infectious disease was placed. Patient has also been scheduled for an I&D of his left finger on 12/14/22. Closing current encounter.     Radha Hodgson RN   [facility]

## 2022-12-14 ENCOUNTER — ANESTHESIA EVENT (OUTPATIENT)
Dept: SURGERY | Facility: CLINIC | Age: 62
DRG: 863 | End: 2022-12-14
Payer: COMMERCIAL

## 2022-12-14 ENCOUNTER — HOSPITAL ENCOUNTER (INPATIENT)
Facility: CLINIC | Age: 62
LOS: 2 days | Discharge: HOME | DRG: 863 | End: 2022-12-16
Attending: STUDENT IN AN ORGANIZED HEALTH CARE EDUCATION/TRAINING PROGRAM | Admitting: STUDENT IN AN ORGANIZED HEALTH CARE EDUCATION/TRAINING PROGRAM
Payer: COMMERCIAL

## 2022-12-14 ENCOUNTER — ANESTHESIA (OUTPATIENT)
Dept: SURGERY | Facility: CLINIC | Age: 62
DRG: 863 | End: 2022-12-14
Payer: COMMERCIAL

## 2022-12-14 DIAGNOSIS — M79.645 PAIN OF FINGER OF LEFT HAND: Primary | ICD-10-CM

## 2022-12-14 DIAGNOSIS — L08.9 FINGER INFECTION: ICD-10-CM

## 2022-12-14 DIAGNOSIS — M25.642 STIFFNESS OF LEFT HAND JOINT: ICD-10-CM

## 2022-12-14 LAB
CREAT SERPL-MCNC: 0.81 MG/DL (ref 0.66–1.25)
GFR SERPL CREATININE-BSD FRML MDRD: >90 ML/MIN/1.73M2

## 2022-12-14 PROCEDURE — 370N000017 HC ANESTHESIA TECHNICAL FEE, PER MIN: Performed by: STUDENT IN AN ORGANIZED HEALTH CARE EDUCATION/TRAINING PROGRAM

## 2022-12-14 PROCEDURE — 82565 ASSAY OF CREATININE: CPT | Performed by: STUDENT IN AN ORGANIZED HEALTH CARE EDUCATION/TRAINING PROGRAM

## 2022-12-14 PROCEDURE — 258N000003 HC RX IP 258 OP 636: Performed by: NURSE ANESTHETIST, CERTIFIED REGISTERED

## 2022-12-14 PROCEDURE — 36415 COLL VENOUS BLD VENIPUNCTURE: CPT | Performed by: STUDENT IN AN ORGANIZED HEALTH CARE EDUCATION/TRAINING PROGRAM

## 2022-12-14 PROCEDURE — 250N000013 HC RX MED GY IP 250 OP 250 PS 637: Performed by: STUDENT IN AN ORGANIZED HEALTH CARE EDUCATION/TRAINING PROGRAM

## 2022-12-14 PROCEDURE — 999N000141 HC STATISTIC PRE-PROCEDURE NURSING ASSESSMENT: Performed by: STUDENT IN AN ORGANIZED HEALTH CARE EDUCATION/TRAINING PROGRAM

## 2022-12-14 PROCEDURE — 272N000001 HC OR GENERAL SUPPLY STERILE: Performed by: STUDENT IN AN ORGANIZED HEALTH CARE EDUCATION/TRAINING PROGRAM

## 2022-12-14 PROCEDURE — 26034 TREAT HAND BONE LESION: CPT | Mod: 78 | Performed by: STUDENT IN AN ORGANIZED HEALTH CARE EDUCATION/TRAINING PROGRAM

## 2022-12-14 PROCEDURE — 360N000075 HC SURGERY LEVEL 2, PER MIN: Performed by: STUDENT IN AN ORGANIZED HEALTH CARE EDUCATION/TRAINING PROGRAM

## 2022-12-14 PROCEDURE — 250N000011 HC RX IP 250 OP 636: Performed by: STUDENT IN AN ORGANIZED HEALTH CARE EDUCATION/TRAINING PROGRAM

## 2022-12-14 PROCEDURE — 0HDGXZZ EXTRACTION OF LEFT HAND SKIN, EXTERNAL APPROACH: ICD-10-PCS | Performed by: STUDENT IN AN ORGANIZED HEALTH CARE EDUCATION/TRAINING PROGRAM

## 2022-12-14 PROCEDURE — 87077 CULTURE AEROBIC IDENTIFY: CPT | Performed by: STUDENT IN AN ORGANIZED HEALTH CARE EDUCATION/TRAINING PROGRAM

## 2022-12-14 PROCEDURE — 87102 FUNGUS ISOLATION CULTURE: CPT | Performed by: STUDENT IN AN ORGANIZED HEALTH CARE EDUCATION/TRAINING PROGRAM

## 2022-12-14 PROCEDURE — 250N000013 HC RX MED GY IP 250 OP 250 PS 637

## 2022-12-14 PROCEDURE — 87116 MYCOBACTERIA CULTURE: CPT | Performed by: STUDENT IN AN ORGANIZED HEALTH CARE EDUCATION/TRAINING PROGRAM

## 2022-12-14 PROCEDURE — 87075 CULTR BACTERIA EXCEPT BLOOD: CPT | Performed by: STUDENT IN AN ORGANIZED HEALTH CARE EDUCATION/TRAINING PROGRAM

## 2022-12-14 PROCEDURE — 250N000011 HC RX IP 250 OP 636

## 2022-12-14 PROCEDURE — 710N000009 HC RECOVERY PHASE 1, LEVEL 1, PER MIN: Performed by: STUDENT IN AN ORGANIZED HEALTH CARE EDUCATION/TRAINING PROGRAM

## 2022-12-14 PROCEDURE — 87070 CULTURE OTHR SPECIMN AEROBIC: CPT | Performed by: STUDENT IN AN ORGANIZED HEALTH CARE EDUCATION/TRAINING PROGRAM

## 2022-12-14 PROCEDURE — 120N000002 HC R&B MED SURG/OB UMMC

## 2022-12-14 PROCEDURE — 258N000003 HC RX IP 258 OP 636: Performed by: STUDENT IN AN ORGANIZED HEALTH CARE EDUCATION/TRAINING PROGRAM

## 2022-12-14 PROCEDURE — 250N000011 HC RX IP 250 OP 636: Performed by: NURSE ANESTHETIST, CERTIFIED REGISTERED

## 2022-12-14 PROCEDURE — 250N000009 HC RX 250: Performed by: STUDENT IN AN ORGANIZED HEALTH CARE EDUCATION/TRAINING PROGRAM

## 2022-12-14 RX ORDER — OXYCODONE HYDROCHLORIDE 10 MG/1
10 TABLET ORAL EVERY 4 HOURS PRN
Status: DISCONTINUED | OUTPATIENT
Start: 2022-12-14 | End: 2022-12-16 | Stop reason: HOSPADM

## 2022-12-14 RX ORDER — AMOXICILLIN 250 MG
1 CAPSULE ORAL 2 TIMES DAILY
Status: DISCONTINUED | OUTPATIENT
Start: 2022-12-14 | End: 2022-12-16 | Stop reason: HOSPADM

## 2022-12-14 RX ORDER — NALOXONE HYDROCHLORIDE 0.4 MG/ML
0.4 INJECTION, SOLUTION INTRAMUSCULAR; INTRAVENOUS; SUBCUTANEOUS
Status: DISCONTINUED | OUTPATIENT
Start: 2022-12-14 | End: 2022-12-16 | Stop reason: HOSPADM

## 2022-12-14 RX ORDER — LIDOCAINE 40 MG/G
CREAM TOPICAL
Status: DISCONTINUED | OUTPATIENT
Start: 2022-12-14 | End: 2022-12-16 | Stop reason: HOSPADM

## 2022-12-14 RX ORDER — OXYCODONE HYDROCHLORIDE 5 MG/1
5 TABLET ORAL EVERY 6 HOURS PRN
Qty: 12 TABLET | Refills: 0 | Status: SHIPPED | OUTPATIENT
Start: 2022-12-14 | End: 2022-12-17

## 2022-12-14 RX ORDER — CEFAZOLIN SODIUM/WATER 2 G/20 ML
2 SYRINGE (ML) INTRAVENOUS SEE ADMIN INSTRUCTIONS
Status: DISCONTINUED | OUTPATIENT
Start: 2022-12-14 | End: 2022-12-14 | Stop reason: HOSPADM

## 2022-12-14 RX ORDER — ACETAMINOPHEN 325 MG/1
650 TABLET ORAL EVERY 4 HOURS PRN
Status: DISCONTINUED | OUTPATIENT
Start: 2022-12-17 | End: 2022-12-16 | Stop reason: HOSPADM

## 2022-12-14 RX ORDER — SODIUM CHLORIDE, SODIUM LACTATE, POTASSIUM CHLORIDE, CALCIUM CHLORIDE 600; 310; 30; 20 MG/100ML; MG/100ML; MG/100ML; MG/100ML
INJECTION, SOLUTION INTRAVENOUS CONTINUOUS
Status: DISCONTINUED | OUTPATIENT
Start: 2022-12-14 | End: 2022-12-16 | Stop reason: HOSPADM

## 2022-12-14 RX ORDER — GABAPENTIN 300 MG/1
300 CAPSULE ORAL 3 TIMES DAILY PRN
Status: DISCONTINUED | OUTPATIENT
Start: 2022-12-14 | End: 2022-12-16 | Stop reason: HOSPADM

## 2022-12-14 RX ORDER — ONDANSETRON 4 MG/1
4 TABLET, ORALLY DISINTEGRATING ORAL EVERY 6 HOURS PRN
Status: DISCONTINUED | OUTPATIENT
Start: 2022-12-14 | End: 2022-12-16 | Stop reason: HOSPADM

## 2022-12-14 RX ORDER — ACETAMINOPHEN 325 MG/1
975 TABLET ORAL EVERY 8 HOURS
Status: DISCONTINUED | OUTPATIENT
Start: 2022-12-14 | End: 2022-12-16 | Stop reason: HOSPADM

## 2022-12-14 RX ORDER — POLYETHYLENE GLYCOL 3350 17 G/17G
17 POWDER, FOR SOLUTION ORAL DAILY
Status: DISCONTINUED | OUTPATIENT
Start: 2022-12-15 | End: 2022-12-16 | Stop reason: HOSPADM

## 2022-12-14 RX ORDER — BISACODYL 10 MG
10 SUPPOSITORY, RECTAL RECTAL DAILY PRN
Status: DISCONTINUED | OUTPATIENT
Start: 2022-12-14 | End: 2022-12-16 | Stop reason: HOSPADM

## 2022-12-14 RX ORDER — SODIUM CHLORIDE, SODIUM LACTATE, POTASSIUM CHLORIDE, CALCIUM CHLORIDE 600; 310; 30; 20 MG/100ML; MG/100ML; MG/100ML; MG/100ML
INJECTION, SOLUTION INTRAVENOUS CONTINUOUS PRN
Status: DISCONTINUED | OUTPATIENT
Start: 2022-12-14 | End: 2022-12-14

## 2022-12-14 RX ORDER — HYDROMORPHONE HYDROCHLORIDE 1 MG/ML
0.2 INJECTION, SOLUTION INTRAMUSCULAR; INTRAVENOUS; SUBCUTANEOUS
Status: DISCONTINUED | OUTPATIENT
Start: 2022-12-14 | End: 2022-12-16 | Stop reason: HOSPADM

## 2022-12-14 RX ORDER — OXYCODONE HYDROCHLORIDE 5 MG/1
5 TABLET ORAL EVERY 4 HOURS PRN
Status: DISCONTINUED | OUTPATIENT
Start: 2022-12-14 | End: 2022-12-16 | Stop reason: HOSPADM

## 2022-12-14 RX ORDER — CEFAZOLIN SODIUM 1 G/50ML
2000 SOLUTION INTRAVENOUS ONCE
Status: COMPLETED | OUTPATIENT
Start: 2022-12-14 | End: 2022-12-15

## 2022-12-14 RX ORDER — GABAPENTIN 300 MG/1
300 CAPSULE ORAL 3 TIMES DAILY
Status: DISCONTINUED | OUTPATIENT
Start: 2022-12-14 | End: 2022-12-14

## 2022-12-14 RX ORDER — CEFAZOLIN SODIUM/WATER 2 G/20 ML
2 SYRINGE (ML) INTRAVENOUS
Status: COMPLETED | OUTPATIENT
Start: 2022-12-14 | End: 2022-12-14

## 2022-12-14 RX ORDER — ATORVASTATIN CALCIUM 20 MG/1
20 TABLET, FILM COATED ORAL DAILY
Status: DISCONTINUED | OUTPATIENT
Start: 2022-12-15 | End: 2022-12-14

## 2022-12-14 RX ORDER — ATORVASTATIN CALCIUM 20 MG/1
20 TABLET, FILM COATED ORAL EVERY EVENING
Status: DISCONTINUED | OUTPATIENT
Start: 2022-12-14 | End: 2022-12-16 | Stop reason: HOSPADM

## 2022-12-14 RX ORDER — FENTANYL CITRATE 50 UG/ML
INJECTION, SOLUTION INTRAMUSCULAR; INTRAVENOUS PRN
Status: DISCONTINUED | OUTPATIENT
Start: 2022-12-14 | End: 2022-12-14

## 2022-12-14 RX ORDER — METHOCARBAMOL 750 MG/1
750 TABLET, FILM COATED ORAL EVERY 6 HOURS PRN
Status: DISCONTINUED | OUTPATIENT
Start: 2022-12-14 | End: 2022-12-16 | Stop reason: HOSPADM

## 2022-12-14 RX ORDER — HYDROXYZINE HYDROCHLORIDE 25 MG/1
25 TABLET, FILM COATED ORAL EVERY 6 HOURS PRN
Status: DISCONTINUED | OUTPATIENT
Start: 2022-12-14 | End: 2022-12-16 | Stop reason: HOSPADM

## 2022-12-14 RX ORDER — NALOXONE HYDROCHLORIDE 0.4 MG/ML
0.2 INJECTION, SOLUTION INTRAMUSCULAR; INTRAVENOUS; SUBCUTANEOUS
Status: DISCONTINUED | OUTPATIENT
Start: 2022-12-14 | End: 2022-12-16 | Stop reason: HOSPADM

## 2022-12-14 RX ORDER — PROPOFOL 10 MG/ML
INJECTION, EMULSION INTRAVENOUS CONTINUOUS PRN
Status: DISCONTINUED | OUTPATIENT
Start: 2022-12-14 | End: 2022-12-14

## 2022-12-14 RX ORDER — PROCHLORPERAZINE MALEATE 10 MG
10 TABLET ORAL EVERY 6 HOURS PRN
Status: DISCONTINUED | OUTPATIENT
Start: 2022-12-14 | End: 2022-12-16 | Stop reason: HOSPADM

## 2022-12-14 RX ORDER — HYDROMORPHONE HYDROCHLORIDE 1 MG/ML
0.4 INJECTION, SOLUTION INTRAMUSCULAR; INTRAVENOUS; SUBCUTANEOUS
Status: DISCONTINUED | OUTPATIENT
Start: 2022-12-14 | End: 2022-12-16 | Stop reason: HOSPADM

## 2022-12-14 RX ORDER — PIPERACILLIN SODIUM, TAZOBACTAM SODIUM 3; .375 G/15ML; G/15ML
3.38 INJECTION, POWDER, LYOPHILIZED, FOR SOLUTION INTRAVENOUS EVERY 6 HOURS
Status: DISCONTINUED | OUTPATIENT
Start: 2022-12-14 | End: 2022-12-15 | Stop reason: ALTCHOICE

## 2022-12-14 RX ORDER — ONDANSETRON 2 MG/ML
4 INJECTION INTRAMUSCULAR; INTRAVENOUS EVERY 6 HOURS PRN
Status: DISCONTINUED | OUTPATIENT
Start: 2022-12-14 | End: 2022-12-16 | Stop reason: HOSPADM

## 2022-12-14 RX ADMIN — ATORVASTATIN CALCIUM 20 MG: 20 TABLET, FILM COATED ORAL at 22:37

## 2022-12-14 RX ADMIN — MIDAZOLAM 2 MG: 1 INJECTION INTRAMUSCULAR; INTRAVENOUS at 18:31

## 2022-12-14 RX ADMIN — FENTANYL CITRATE 25 MCG: 50 INJECTION, SOLUTION INTRAMUSCULAR; INTRAVENOUS at 18:45

## 2022-12-14 RX ADMIN — VANCOMYCIN HYDROCHLORIDE 2000 MG: 1 INJECTION, POWDER, LYOPHILIZED, FOR SOLUTION INTRAVENOUS at 22:37

## 2022-12-14 RX ADMIN — FENTANYL CITRATE 25 MCG: 50 INJECTION, SOLUTION INTRAMUSCULAR; INTRAVENOUS at 18:54

## 2022-12-14 RX ADMIN — PROPOFOL 150 MCG/KG/MIN: 10 INJECTION, EMULSION INTRAVENOUS at 18:36

## 2022-12-14 RX ADMIN — Medication 2 G: at 19:03

## 2022-12-14 RX ADMIN — SODIUM CHLORIDE, POTASSIUM CHLORIDE, SODIUM LACTATE AND CALCIUM CHLORIDE: 600; 310; 30; 20 INJECTION, SOLUTION INTRAVENOUS at 18:31

## 2022-12-14 RX ADMIN — ACETAMINOPHEN 975 MG: 325 TABLET, FILM COATED ORAL at 22:37

## 2022-12-14 RX ADMIN — PIPERACILLIN AND TAZOBACTAM 3.38 G: 3; .375 INJECTION, POWDER, LYOPHILIZED, FOR SOLUTION INTRAVENOUS at 21:13

## 2022-12-14 ASSESSMENT — ACTIVITIES OF DAILY LIVING (ADL)
WEAR_GLASSES_OR_BLIND: NO
ADLS_ACUITY_SCORE: 18
CONCENTRATING,_REMEMBERING_OR_MAKING_DECISIONS_DIFFICULTY: NO
ADLS_ACUITY_SCORE: 35
CHANGE_IN_FUNCTIONAL_STATUS_SINCE_ONSET_OF_CURRENT_ILLNESS/INJURY: NO
WALKING_OR_CLIMBING_STAIRS_DIFFICULTY: NO
DOING_ERRANDS_INDEPENDENTLY_DIFFICULTY: NO
ADLS_ACUITY_SCORE: 35
ADLS_ACUITY_SCORE: 35
TOILETING_ISSUES: NO
DIFFICULTY_EATING/SWALLOWING: NO
DRESSING/BATHING_DIFFICULTY: NO
FALL_HISTORY_WITHIN_LAST_SIX_MONTHS: NO

## 2022-12-14 NOTE — INTERVAL H&P NOTE
I have reviewed the surgical (or preoperative) H&P that is linked to this encounter, and examined the patient. There are no significant changes    Exam:  Constitutional: healthy, alert, and no distress  Head: normocephalic  Cardiovascular: regular rate and rhythm  Respiratory: normal work of breathing, no wheezes  Gastrointestinal: nondistended    Left index finger  Erythema about the index finger  Wound volar finger with draining purulent fluid  Sensation intact volar index finger  Able to flex and extend index finger    I had a thorough discussion today with the patient with regard to the intended surgical procedure: left index finger incision and drainage, irrigation and debridement. We reviewed the risks and benefits associated with conservative management and surgical intervention.  Surgery is indicated to treat the left index finger infection.  The risks of surgery include but are not limited to infection, bleeding, nerve injury, surgical scar, CRPS, anesthetic complications, etc.  We also discussed that there is a possibility that the surgery will not be successful and will require repeat surgery which may include amputation of part or all of the digit. We reviewed post-operative pain management and rehabilitation.  All questions answered.  The patient fully understands and is agreeable to proceeding with the surgical procedure. An informed consent was signed.

## 2022-12-14 NOTE — TELEPHONE ENCOUNTER
Requested Prescriptions   Pending Prescriptions Disp Refills    predniSONE (DELTASONE) 20 MG tablet [Pharmacy Med Name: PREDNISONE 20MG TABS] 18 tablet 1     Sig: TAKE THREE TABLETS BY MOUTH ONCE DAILY FOR 3 DAYS, THEN TAKE TWO TABLETS BY MOUTH ONCE DAILY FOR 3 DAYS , THEN TAKE ONE TABLET BY MOUTH ONCE DAILY FOR 3 DAYS       There is no refill protocol information for this order          Taryn Barr, BSN, RN
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
## 2022-12-15 LAB
CREAT SERPL-MCNC: 0.96 MG/DL (ref 0.66–1.25)
GFR SERPL CREATININE-BSD FRML MDRD: 89 ML/MIN/1.73M2
GLUCOSE BLDC GLUCOMTR-MCNC: 106 MG/DL (ref 70–99)
HOLD SPECIMEN: NORMAL
MRSA DNA SPEC QL NAA+PROBE: NEGATIVE
SA TARGET DNA: NEGATIVE

## 2022-12-15 PROCEDURE — 82565 ASSAY OF CREATININE: CPT | Performed by: STUDENT IN AN ORGANIZED HEALTH CARE EDUCATION/TRAINING PROGRAM

## 2022-12-15 PROCEDURE — 250N000013 HC RX MED GY IP 250 OP 250 PS 637: Performed by: STUDENT IN AN ORGANIZED HEALTH CARE EDUCATION/TRAINING PROGRAM

## 2022-12-15 PROCEDURE — 120N000002 HC R&B MED SURG/OB UMMC

## 2022-12-15 PROCEDURE — 250N000011 HC RX IP 250 OP 636: Performed by: STUDENT IN AN ORGANIZED HEALTH CARE EDUCATION/TRAINING PROGRAM

## 2022-12-15 PROCEDURE — 36415 COLL VENOUS BLD VENIPUNCTURE: CPT | Performed by: STUDENT IN AN ORGANIZED HEALTH CARE EDUCATION/TRAINING PROGRAM

## 2022-12-15 PROCEDURE — 258N000003 HC RX IP 258 OP 636: Performed by: STUDENT IN AN ORGANIZED HEALTH CARE EDUCATION/TRAINING PROGRAM

## 2022-12-15 PROCEDURE — 258N000003 HC RX IP 258 OP 636

## 2022-12-15 PROCEDURE — 999N000111 HC STATISTIC OT IP EVAL DEFER

## 2022-12-15 PROCEDURE — 99223 1ST HOSP IP/OBS HIGH 75: CPT | Performed by: STUDENT IN AN ORGANIZED HEALTH CARE EDUCATION/TRAINING PROGRAM

## 2022-12-15 PROCEDURE — 250N000011 HC RX IP 250 OP 636

## 2022-12-15 PROCEDURE — 87641 MR-STAPH DNA AMP PROBE: CPT | Performed by: STUDENT IN AN ORGANIZED HEALTH CARE EDUCATION/TRAINING PROGRAM

## 2022-12-15 PROCEDURE — 250N000013 HC RX MED GY IP 250 OP 250 PS 637

## 2022-12-15 RX ORDER — AMOXICILLIN 250 MG
1 CAPSULE ORAL 2 TIMES DAILY
Qty: 30 TABLET | Refills: 0 | Status: SHIPPED | OUTPATIENT
Start: 2022-12-15 | End: 2023-03-13

## 2022-12-15 RX ORDER — HYDROXYZINE HYDROCHLORIDE 25 MG/1
25 TABLET, FILM COATED ORAL EVERY 6 HOURS PRN
Qty: 40 TABLET | Refills: 0 | Status: SHIPPED | OUTPATIENT
Start: 2022-12-15 | End: 2023-03-13

## 2022-12-15 RX ORDER — POLYETHYLENE GLYCOL 3350 17 G/17G
17 POWDER, FOR SOLUTION ORAL DAILY
Qty: 10 PACKET | Refills: 0 | Status: SHIPPED | OUTPATIENT
Start: 2022-12-15 | End: 2023-03-13

## 2022-12-15 RX ORDER — PREDNISONE 20 MG/1
TABLET ORAL
Qty: 18 TABLET | Refills: 1 | Status: SHIPPED | OUTPATIENT
Start: 2022-12-15 | End: 2023-03-13

## 2022-12-15 RX ORDER — CALCIUM CARBONATE 500 MG/1
500 TABLET, CHEWABLE ORAL DAILY PRN
Status: DISCONTINUED | OUTPATIENT
Start: 2022-12-15 | End: 2022-12-16 | Stop reason: HOSPADM

## 2022-12-15 RX ORDER — ACETAMINOPHEN 325 MG/1
650 TABLET ORAL EVERY 4 HOURS PRN
Qty: 60 TABLET | Refills: 0 | Status: SHIPPED | OUTPATIENT
Start: 2022-12-17 | End: 2023-03-13

## 2022-12-15 RX ORDER — CEFTRIAXONE 2 G/1
2 INJECTION, POWDER, FOR SOLUTION INTRAMUSCULAR; INTRAVENOUS EVERY 24 HOURS
Status: DISCONTINUED | OUTPATIENT
Start: 2022-12-15 | End: 2022-12-16 | Stop reason: HOSPADM

## 2022-12-15 RX ORDER — METHOCARBAMOL 750 MG/1
750 TABLET, FILM COATED ORAL EVERY 6 HOURS PRN
Qty: 30 TABLET | Refills: 0 | Status: SHIPPED | OUTPATIENT
Start: 2022-12-15 | End: 2022-12-27

## 2022-12-15 RX ORDER — SODIUM CHLORIDE 9 MG/ML
INJECTION, SOLUTION INTRAVENOUS
Status: COMPLETED
Start: 2022-12-15 | End: 2022-12-15

## 2022-12-15 RX ADMIN — SENNOSIDES AND DOCUSATE SODIUM 1 TABLET: 50; 8.6 TABLET ORAL at 21:48

## 2022-12-15 RX ADMIN — VANCOMYCIN HYDROCHLORIDE 1250 MG: 10 INJECTION, POWDER, LYOPHILIZED, FOR SOLUTION INTRAVENOUS at 11:25

## 2022-12-15 RX ADMIN — METHOCARBAMOL 750 MG: 750 TABLET ORAL at 16:19

## 2022-12-15 RX ADMIN — HYDROXYZINE HYDROCHLORIDE 25 MG: 25 TABLET, FILM COATED ORAL at 16:19

## 2022-12-15 RX ADMIN — CEFTRIAXONE SODIUM 2 G: 2 INJECTION, POWDER, FOR SOLUTION INTRAMUSCULAR; INTRAVENOUS at 16:09

## 2022-12-15 RX ADMIN — PIPERACILLIN AND TAZOBACTAM 3.38 G: 3; .375 INJECTION, POWDER, LYOPHILIZED, FOR SOLUTION INTRAVENOUS at 02:36

## 2022-12-15 RX ADMIN — ATORVASTATIN CALCIUM 20 MG: 20 TABLET, FILM COATED ORAL at 21:48

## 2022-12-15 RX ADMIN — ACETAMINOPHEN 975 MG: 325 TABLET, FILM COATED ORAL at 05:19

## 2022-12-15 RX ADMIN — PIPERACILLIN AND TAZOBACTAM 3.38 G: 3; .375 INJECTION, POWDER, LYOPHILIZED, FOR SOLUTION INTRAVENOUS at 07:59

## 2022-12-15 RX ADMIN — ACETAMINOPHEN 975 MG: 325 TABLET, FILM COATED ORAL at 11:30

## 2022-12-15 RX ADMIN — SODIUM CHLORIDE 500 ML: 9 INJECTION, SOLUTION INTRAVENOUS at 16:16

## 2022-12-15 RX ADMIN — ACETAMINOPHEN 975 MG: 325 TABLET, FILM COATED ORAL at 21:47

## 2022-12-15 RX ADMIN — OXYCODONE HYDROCHLORIDE 10 MG: 10 TABLET ORAL at 05:19

## 2022-12-15 RX ADMIN — OXYCODONE HYDROCHLORIDE 10 MG: 10 TABLET ORAL at 01:00

## 2022-12-15 RX ADMIN — ONDANSETRON 4 MG: 4 TABLET, ORALLY DISINTEGRATING ORAL at 16:19

## 2022-12-15 RX ADMIN — METHOCARBAMOL 750 MG: 750 TABLET ORAL at 10:26

## 2022-12-15 RX ADMIN — ONDANSETRON 4 MG: 2 INJECTION INTRAMUSCULAR; INTRAVENOUS at 07:13

## 2022-12-15 ASSESSMENT — ACTIVITIES OF DAILY LIVING (ADL)
ADLS_ACUITY_SCORE: 20
ADLS_ACUITY_SCORE: 18
ADLS_ACUITY_SCORE: 20

## 2022-12-15 NOTE — PHARMACY-VANCOMYCIN DOSING SERVICE
"Pharmacy Vancomycin Initial Note  Date of Service December 15, 2022  Patient's  1960  61 year old, male    Indication: Osteomyelitis and Skin and Soft Tissue Infection    Current estimated CrCl = Estimated Creatinine Clearance: 106.3 mL/min (based on SCr of 0.81 mg/dL).    Creatinine for last 3 days  2022:  8:58 PM Creatinine 0.81 mg/dL    Recent Vancomycin Level(s) for last 3 days  No results found for requested labs within last 72 hours.      Vancomycin IV Administrations (past 72 hours)                   vancomycin (VANCOCIN) 2,000 mg in sodium chloride 0.9 % 500 mL intermittent infusion (mg) 2,000 mg New Bag 227                Nephrotoxins and other renal medications (From now, onward)    Start     Dose/Rate Route Frequency Ordered Stop    12/15/22 1100  vancomycin (VANCOCIN) 1,250 mg in 0.9% NaCl 250 mL intermittent infusion         1,250 mg  over 90 Minutes Intravenous EVERY 12 HOURS 12/15/22 0212      12/14/22 2030  piperacillin-tazobactam (ZOSYN) 3.375 g vial to attach to  mL bag        Note to Pharmacy: For [facility], [facility] and [facility]: For Zosyn-naive patients, use the \"Zosyn initial dose + extended infusion\" order panel.    3.375 g  over 30 Minutes Intravenous EVERY 6 HOURS 22            Contrast Orders - past 72 hours (72h ago, onward)    None          [employer] Prediction of Planned Initial Vancomycin Regimen  Loading dose: N/A  Regimen: 1250 mg IV every 12 hours.  Start time: 03:09 on 12/15/2022  Exposure target: AUC24 (range)400-600 mg/L.hr   AUC24,ss: 553 mg/L.hr  Probability of AUC24 > 400: 81 %  Ctrough,ss: 17.5 mg/L  Probability of Ctrough,ss > 20: 39 %  Probability of nephrotoxicity (Lodise PHOEBE ): 13 %          Plan:  1. Start vancomycin  1250 mg IV q12h.   2. Vancomycin monitoring method: AUC  3. Vancomycin therapeutic monitoring goal: 400-600 mg*h/L  4. Pharmacy will check vancomycin levels as appropriate in 1-3 Days.    5. Serum creatinine levels will be " ordered every 48 hours.      Refugio Licea, [facility]

## 2022-12-15 NOTE — PHARMACY-ADMISSION MEDICATION HISTORY
Admission Medication History Completed by Pharmacy    See [facility] Admission Navigator for allergy information, preferred outpatient pharmacy, prior to admission medications and immunization status.     Medication History Sources:     Patient interview    Surescripts dispense report    Changes made to PTA medication list (reason):    Added: None    Deleted:   o Meloxicam   o Bactrim      Changed: None    Additional Information:    Prednisone: Patient takes periodic bursts of this to treat neuropathy and weakness in his hips/legs that make it difficult to walk. He has not taken this since the end of October.     Prior to Admission medications    Medication Sig Last Dose Taking? Auth Provider Long Term End Date   atorvastatin (LIPITOR) 20 MG tablet Take 1 tablet (20 mg) by mouth daily  Patient taking differently: Take 20 mg by mouth At Bedtime 12/13/2022 at 2100 Yes Yoni Kong MD Yes    gabapentin (NEURONTIN) 300 MG capsule TAKE ONE CAPSULE BY MOUTH THREE TIMES A DAY  Patient taking differently: Take 300 mg by mouth 3 times daily as needed for neuropathic pain 12/14/2022 at 1345 Yes Yoni Kong MD Yes    methocarbamol (ROBAXIN) 500 MG tablet TAKE TWO TABLETS BY MOUTH THREE TIMES A DAY AS NEEDED FOR MUSCLE SPASMS  Patient taking differently: Take 1,000 mg by mouth 3 times daily as needed for muscle spasms 12/14/2022 at 1345 Yes Yoni Kong MD     oxyCODONE (ROXICODONE) 5 MG tablet Take 1 tablet (5 mg) by mouth every 6 hours as needed for pain  Yes Kyara Cleaning MD  12/17/22   predniSONE (DELTASONE) 20 MG tablet TAKE THREE TABLETS BY MOUTH ONCE DAILY FOR 3 DAYS, THEN TAKE TWO TABLETS BY MOUTH ONCE DAILY FOR 3 DAYS , THEN TAKE ONE TABLET BY MOUTH ONCE DAILY FOR 3 DAYS More than a month Yes Yoni Kong MD         Date completed: 12/14/22    Medication history completed by: Michael Barragan [facility]

## 2022-12-15 NOTE — ANESTHESIA POSTPROCEDURE EVALUATION
Patient: Shashi Raza    Procedure: Procedure(s):  IRRIGATION AND DEBRIDEMENT, LEFT INDEX FINGER       Anesthesia Type:  MAC    Note:  Disposition: Outpatient   Postop Pain Control: Uneventful            Sign Out: Well controlled pain   PONV: No   Neuro/Psych: Uneventful            Sign Out: Acceptable/Baseline neuro status   Airway/Respiratory: Uneventful            Sign Out: Acceptable/Baseline resp. status   CV/Hemodynamics: Uneventful            Sign Out: Acceptable CV status   Other NRE: NONE   DID A NON-ROUTINE EVENT OCCUR? No           Last vitals:  Vitals Value Taken Time   /78 12/14/22 2015   Temp 36.3  C (97.3  F) 12/14/22 2015   Pulse 73 12/14/22 2015   Resp 16 12/14/22 2015   SpO2 97 % 12/14/22 2030   Vitals shown include unvalidated device data.    Electronically Signed By: Hamilton Castro MD  December 14, 2022  8:40 PM

## 2022-12-15 NOTE — OP NOTE
OPERATIVE REPORT    PATIENT NAME: Shashi Raza  MRN: [id number]    DATE: 12/14/2022    PREOPERATIVE DIAGNOSIS:   1. Left index finger surgical site infection with osteomyelitis    POSTOPERATIVE DIAGNOSIS:   1. Left index finger surgical site infection with osteomyelitis    OPERATION:   1. Left index finger irrigation and debridement    SURGEON: BA LO MD    ASSISTANT: Baron To MD, PGY2    STAFF: Circulator: Lino Rutledge RN; Stef Smith RN  Relief Scrub: Mulvihill, Kristin K  Scrub Person: Susanne Obrien RN    TYPE OF ANESTHESIA:  Digital block and IV sedation    IMPLANTS: * No implants in log *    ESTIMATED BLOOD LOSS: See anesthesia records.    URINE OUTPUT: n/a - no keith    TOURNIQUET TIME: 25 minutes     COMPLICATION: None.     INDICATIONS: Shashi Raza is a 61 year old male who sustained a left index finger flexor digitorum profundus tendon laceration in zone 1. It was repaired using a button technique on 9/23/2022. He was doing well until about 1 week ago when he developed redness, and 3-4 days ago when he developed drainage. He contacted our office on 12/12/2022 and was evaluated via video visit on 12/13/2022. Radiographs showed concern for osteomyelitis. Therefore the decision to proceed with surgical debridement was made.  After a thorough evaluation, the patient was indicated for operative intervention. The risks, benefits, and alternatives were discussed with the patient.  The patient verbalized understanding of the treatment plan and signed a written consent.     DESCRIPTION OF PROCEDURE: The patient was taken to the operating room and placed the supine position on the operating table. Anesthesia was administered by the Department of Anesthesia. A digital block with 1% lidocaine and 0.5% bupivacaine in a 1:1 mixture was performed after prepping the finger with alcohol. Antibiotics (Ancef) were held until cultures were obtained. A high arm tourniquet was applied to the  operative extremity, which was subsequently prepped and draped in the usual sterile fashion.  A timeout was performed with all OR staff and all were in agreement.     The extremity was exsanguinated proximal to the infected digit with an Esmarch bandage and the tourniquet was elevated to 250 mmHg. The nail plate was removed. There was no significant purulence encountered, but the nail plate was not adherent to the nail bed in the region of the prior pull out sutures. Attention was then turned to the volar index finger. The prior Waldemar incision was used incorporating the volar draining wound. Purulence was encountered volar to P2. Cultures were taken. Ancef was given. The dissection was carried through the subcutaneous tissues. The neurovascular bundles were identified and protected. Nonviable tissue was debrided. The flexor tendon repair was found to be intact. There was a small amount of prolene suture remaining which was removed. A 0.045 K wire was used to make 3 bone tunnels in the distal phalanx to allow for drainage. The wound was irrigated with 1L of normal saline. The tourniquet was released and hemostasis was achieved. The corners of the Waldemar incision were loosely reapproximated with 4-0 Nylon suture. The wound was packed with Iodoform packing strip. Capillary refill was intact and <2 seconds.    All counts were correct. The patient was aroused from anesthesia and taken to the recovery room in stable condition.    POSTOPERATIVE PLAN: wet to dry dressings BID, vancomycin/Zosyn until culture results, infectious disease consult placed    BA LO MD

## 2022-12-15 NOTE — BRIEF OP NOTE
[facility]    Brief Operative Note    Pre-operative diagnosis: Surgical site infection [T81.49XA]  Post-operative diagnosis Same as pre-operative diagnosis    Procedure: Procedure(s):  IRRIGATION AND DEBRIDEMENT, LEFT INDEX FINGER  Surgeon: Surgeon(s) and Role:     * Kyara Cleaning MD - Primary      * Baron To MD - Resident - Assistant  Anesthesia: Choice   Estimated Blood Loss: 5 mL from 12/14/2022  6:31 PM to 12/14/2022  7:45 PM      Drains: None  Specimens:   ID Type Source Tests Collected by Time Destination   A : Left Index Finger Abscess Finger, Left AFB CULTURE AND STAIN NON BLOOD, ANAEROBIC BACTERIAL CULTURE ROUTINE, FUNGAL OR YEAST CULTURE ROUTINE, AEROBIC BACTERIAL CULTURE ROUTINE Kyara Cleaning MD 12/14/2022  7:01 PM      Complications: None.  Implants: * No implants in log *          POST OPERATIVE PLAN    Assessment/Plan: Shashi Raza is a 62yo M with left index finger FDP repair in zone 1 complicated by surgical site infection and P3 osteomyelitis s/p I&D left index finger and nail removal with nailbed stenting  on 12/14/2022 with Dr. Cleaning.     Activity: Up with assist until independent  Weight bearing status: WBAT, avoid using left index finger  Antibiotics: Vancomycin and Zosyn, follow ID recs  Diet: Regular. Bowel regimen. Anti-emetics PRN.    DVT prophylaxis: Mechanical, ambulation  Wound Care: Wet to dry dressing BID starting POD1, wound care consult placed.  Drains: none, wound is packed with iodine packing gauze and loosely sutured with 2 stitches.   Pain management: Orals PRN, IV for breakthrough only  X-rays: None needed  Physical Therapy: Mobilization, ROM, ADL's  Occupational Therapy: ADL's  Consults: PT, OT, ID. Appreciate assistance in caring for this patient throughout the perioperative period  Disposition: Pending progress with therapies, pain control on orals, and medical stability, anticipate discharge to home on  POD1-2    Future Appointments   Date Time Provider Department Center   12/19/2022  9:00 AM Tray Carvalho PA-C [facility]   12/19/2022 10:10 AM PH MA/LPN [facility]         Baron To MD  Orthopaedic Surgery, PGY2

## 2022-12-15 NOTE — ANESTHESIA CARE TRANSFER NOTE
Patient: Shashi Raza    Procedure: Procedure(s):  IRRIGATION AND DEBRIDEMENT, LEFT INDEX FINGER       Diagnosis: Surgical site infection [T81.49XA]  Diagnosis Additional Information: No value filed.    Anesthesia Type:   MAC     Note:      Level of Consciousness: awake  Oxygen Supplementation: face mask    Independent Airway: airway patency satisfactory and stable  Dentition: dentition unchanged  Vital Signs Stable: post-procedure vital signs reviewed and stable  Report to RN Given: handoff report given  Patient transferred to: PACU    Handoff Report: Identifed the Patient, Identified the Reponsible Provider, Reviewed the pertinent medical history, Discussed the surgical course, Reviewed Intra-OP anesthesia mangement and issues during anesthesia, Set expectations for post-procedure period and Allowed opportunity for questions and acknowledgement of understanding      Vitals:  Vitals Value Taken Time   /75 12/14/22 1946   Temp     Pulse 73 12/14/22 1951   Resp 13 12/14/22 1951   SpO2 99 % 12/14/22 1951   Vitals shown include unvalidated device data.    Electronically Signed By: WEI Kimbrough CRNA  December 14, 2022  7:52 PM

## 2022-12-15 NOTE — PLAN OF CARE
A/Ox's 4. Pt rated pain as tolerable. Oxycodone and Tylenol given for pain control. Dressing CDI. CMS to baseline. Tolerated regular diet. Denied any nausea, CP, SOB, lightheadedness or dizziness. Voiding without pain or difficulty.  Up with SBA. ABX given per plan of care. Resting in bed at this time with call light in reach. Able to make needs known. Continue to monitor.

## 2022-12-15 NOTE — PROGRESS NOTES
Orthopaedic Surgery Progress Note 12/15/2022    S: No acute events overnight.  Pain well controlled on IV/Oral meds. Denies numbness or tingling in the affected extremity. chest pain (-), SOB(-), nausea/vomiting(-). Tolerating oral diet. BM(-) flatus(+). Voiding spontaneously.     O:  Temp: (!) (P) 96.4  F (35.8  C) Temp src: (P) Oral BP: (P) 122/66 Pulse: (P) 68   Resp: (P) 16 SpO2: 97 % O2 Device: None (Room air) Oxygen Delivery: 8 LPM    Exam:  Gen: No acute distress, resting comfortably in bed.  Resp: Non-labored breathing  MSK:  LUE:  - Dressings c/d/i with exchange at the bedside.  - Well-appearing surgical site, no purulence.  No drainage.  - SILT medial/radial/ulnar/axillary nerves  - Fires EPL, FPL, Intrinsics  - Radial pulse 2+, hand wwp              Recent Labs   Lab 12/13/22  0945   WBC 5.6   HGB 14.8      CRP 10.6*     Sed Rate   Date Value Ref Range Status   01/21/2021 3 0 - 20 mm/h Final       7-Day Micro Results     Collected Updated Procedure Result Status      12/14/2022 1901 12/14/2022 1947 Acid-Fast Bacilli Culture and Stain [[id number]]    Abscess from Finger, Left    In process Component Value   No component results            12/14/2022 1901 12/14/2022 1947 Anaerobic Bacterial Culture Routine [[id number]]   Abscess from Finger, Left    In process Component Value   No component results               12/14/2022 1901 12/14/2022 1947 Fungal or Yeast Culture Routine [[id number]]   Abscess from Finger, Left    In process Component Value   No component results               12/14/2022 1901 12/14/2022 1947 Abscess Aerobic Bacterial Culture Routine [[id number]]   Abscess from Finger, Left    In process Component Value   No component results               12/14/2022 1901 12/14/2022 1947 Acid-Fast Bacilli Culture and Stain [[id number]]   Abscess from Finger, Left    In process Component Value   No component results                    Assessment/Plan: Shashi Raza is a 62yo M with left  index finger FDP repair in zone 1 complicated by surgical site infection and P3 osteomyelitis s/p I&D left index finger and nail removal with nailbed stenting  on 12/14/2022 with Dr. Cleaning.     Today's plan  - ID consultation, appreciate antibiotic recommendations  - Twice daily wet-to-dry dressing changes with wound care team  - Transition to oral pain medications only  - Discharge planning     Activity: Up with assist until independent  Weight bearing status: WBAT, avoid using left index finger  Antibiotics: Vancomycin and Zosyn, follow ID recs  Diet: Regular. Bowel regimen. Anti-emetics PRN.    DVT prophylaxis: Mechanical, ambulation  Wound Care: Wet to dry dressing BID starting POD1, wound care consult placed.  Drains: none, wound is packed with iodine packing gauze and loosely sutured with 2 stitches.   Pain management: Orals PRN, IV for breakthrough only  X-rays: None needed  Physical Therapy: Mobilization, ROM, ADL's  Occupational Therapy: ADL's  Consults: PT, OT, ID. Appreciate assistance in caring for this patient throughout the perioperative period  Disposition: Pending progress with therapies, pain control on orals, and medical stability, anticipate discharge to home on POD1-2    Ramirez Hernandez MD

## 2022-12-15 NOTE — PLAN OF CARE
Occupational Therapy: Orders received. Chart reviewed and discussed with care team.? Occupational Therapy not indicated as patient is s/p I & D L index finger, bandaged up, with orders stating to avoid using it.?Pt reports no concerns with mobility or ADLs. No skilled inpatient OT needs. Defer discharge recommendations to medical team.? Once wound healed, may benefit from outpatient hand therapy per MD discretion. Will complete orders.
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
## 2022-12-15 NOTE — PLAN OF CARE
"  VS: /81   Pulse 56   Temp (!) (P) 96.5  F (35.8  C) (Oral)   Resp (P) 16   Ht 1.778 m (5' 10\")   Wt 91.7 kg (202 lb 2.6 oz)   SpO2 (P) 97%   BMI 29.01 kg/m     O2: Stable on room air   Output: Voids without difficulty in bathroom   Last BM: 12/14; active bowel sounds   Activity: SBA   Skin: L index finger surgical wound    Pain: C/o 3/10 pain in L index finger managed with rest and PRN medications   CMS: Baseline numbness or tingling in all extremities   Dressing: L index finger- CDI. Dressing changes BID- second change needs to be done   Diet: Regular diet, thin liquids, takes pills whole   LDA: R PIV infusing TKO   Equipment: IV pole, personal belongings   Plan: Continue plan of care   Additional Info: A&Ox4. Denies headache, chest pain, SOB. Felt light headed and has had intermittent nausea throughout the day. Educated to call before getting up. Uses call light appropriately and makes needs known.        "

## 2022-12-16 VITALS
SYSTOLIC BLOOD PRESSURE: 132 MMHG | RESPIRATION RATE: 16 BRPM | TEMPERATURE: 96.8 F | OXYGEN SATURATION: 93 % | DIASTOLIC BLOOD PRESSURE: 75 MMHG | HEART RATE: 86 BPM | BODY MASS INDEX: 28.94 KG/M2 | HEIGHT: 70 IN | WEIGHT: 202.16 LBS

## 2022-12-16 PROBLEM — L08.9 FINGER INFECTION: Status: ACTIVE | Noted: 2022-12-16

## 2022-12-16 LAB
CREAT SERPL-MCNC: 0.93 MG/DL (ref 0.66–1.25)
GFR SERPL CREATININE-BSD FRML MDRD: >90 ML/MIN/1.73M2
GLUCOSE BLDC GLUCOMTR-MCNC: 101 MG/DL (ref 70–99)
HOLD SPECIMEN: NORMAL
HOLD SPECIMEN: NORMAL
VANCOMYCIN SERPL-MCNC: 17.1 MG/L

## 2022-12-16 PROCEDURE — 999N000040 HC STATISTIC CONSULT NO CHARGE VASC ACCESS

## 2022-12-16 PROCEDURE — 258N000003 HC RX IP 258 OP 636: Performed by: STUDENT IN AN ORGANIZED HEALTH CARE EDUCATION/TRAINING PROGRAM

## 2022-12-16 PROCEDURE — 99233 SBSQ HOSP IP/OBS HIGH 50: CPT | Performed by: STUDENT IN AN ORGANIZED HEALTH CARE EDUCATION/TRAINING PROGRAM

## 2022-12-16 PROCEDURE — 80202 ASSAY OF VANCOMYCIN: CPT | Performed by: STUDENT IN AN ORGANIZED HEALTH CARE EDUCATION/TRAINING PROGRAM

## 2022-12-16 PROCEDURE — 82565 ASSAY OF CREATININE: CPT | Performed by: STUDENT IN AN ORGANIZED HEALTH CARE EDUCATION/TRAINING PROGRAM

## 2022-12-16 PROCEDURE — 36415 COLL VENOUS BLD VENIPUNCTURE: CPT | Performed by: STUDENT IN AN ORGANIZED HEALTH CARE EDUCATION/TRAINING PROGRAM

## 2022-12-16 PROCEDURE — 272N000469 HC KIT, CATH IV, 22 GA X 6CM, ENDURANCE EXT DWELL

## 2022-12-16 PROCEDURE — 250N000011 HC RX IP 250 OP 636: Performed by: STUDENT IN AN ORGANIZED HEALTH CARE EDUCATION/TRAINING PROGRAM

## 2022-12-16 PROCEDURE — 250N000013 HC RX MED GY IP 250 OP 250 PS 637

## 2022-12-16 PROCEDURE — 36569 INSJ PICC 5 YR+ W/O IMAGING: CPT

## 2022-12-16 RX ORDER — ALBUTEROL SULFATE 0.83 MG/ML
2.5 SOLUTION RESPIRATORY (INHALATION)
Status: CANCELLED | OUTPATIENT
Start: 2022-12-16

## 2022-12-16 RX ORDER — DIPHENHYDRAMINE HYDROCHLORIDE 50 MG/ML
50 INJECTION INTRAMUSCULAR; INTRAVENOUS
Status: CANCELLED
Start: 2022-12-16

## 2022-12-16 RX ORDER — METHYLPREDNISOLONE SODIUM SUCCINATE 125 MG/2ML
125 INJECTION, POWDER, LYOPHILIZED, FOR SOLUTION INTRAMUSCULAR; INTRAVENOUS
Status: CANCELLED
Start: 2022-12-16

## 2022-12-16 RX ORDER — VANCOMYCIN HYDROCHLORIDE 1 G/200ML
1000 INJECTION, SOLUTION INTRAVENOUS EVERY 12 HOURS
Status: DISCONTINUED | OUTPATIENT
Start: 2022-12-16 | End: 2022-12-16

## 2022-12-16 RX ORDER — CEFTRIAXONE 2 G/1
2 INJECTION, POWDER, FOR SOLUTION INTRAMUSCULAR; INTRAVENOUS ONCE
Status: CANCELLED
Start: 2022-12-16 | End: 2022-12-16

## 2022-12-16 RX ORDER — CEFTRIAXONE 2 G/1
2 INJECTION, POWDER, FOR SOLUTION INTRAMUSCULAR; INTRAVENOUS EVERY 24 HOURS
Qty: 140 ML | Refills: 0 | Status: SHIPPED | OUTPATIENT
Start: 2022-12-16 | End: 2023-03-13

## 2022-12-16 RX ORDER — EPINEPHRINE 1 MG/ML
0.3 INJECTION, SOLUTION, CONCENTRATE INTRAVENOUS EVERY 5 MIN PRN
Status: CANCELLED | OUTPATIENT
Start: 2022-12-16

## 2022-12-16 RX ORDER — HEPARIN SODIUM (PORCINE) LOCK FLUSH IV SOLN 100 UNIT/ML 100 UNIT/ML
5 SOLUTION INTRAVENOUS
Status: CANCELLED | OUTPATIENT
Start: 2022-12-16

## 2022-12-16 RX ORDER — LIDOCAINE 40 MG/G
CREAM TOPICAL
Status: DISCONTINUED | OUTPATIENT
Start: 2022-12-16 | End: 2022-12-16 | Stop reason: HOSPADM

## 2022-12-16 RX ORDER — ALBUTEROL SULFATE 90 UG/1
1-2 AEROSOL, METERED RESPIRATORY (INHALATION)
Status: CANCELLED
Start: 2022-12-16

## 2022-12-16 RX ORDER — MEPERIDINE HYDROCHLORIDE 25 MG/ML
25 INJECTION INTRAMUSCULAR; INTRAVENOUS; SUBCUTANEOUS EVERY 30 MIN PRN
Status: CANCELLED | OUTPATIENT
Start: 2022-12-16

## 2022-12-16 RX ORDER — CALCIUM CARBONATE 500 MG/1
1-2 TABLET, CHEWABLE ORAL DAILY PRN
Qty: 45 TABLET | Refills: 0 | Status: SHIPPED | OUTPATIENT
Start: 2022-12-16 | End: 2023-03-13

## 2022-12-16 RX ORDER — CEFTRIAXONE 1 G/1
1 INJECTION, POWDER, FOR SOLUTION INTRAMUSCULAR; INTRAVENOUS ONCE
Status: CANCELLED
Start: 2022-12-16 | End: 2022-12-16

## 2022-12-16 RX ORDER — CEFAZOLIN SODIUM 1 G/50ML
2000 SOLUTION INTRAVENOUS EVERY 24 HOURS
Status: DISCONTINUED | OUTPATIENT
Start: 2022-12-16 | End: 2022-12-16 | Stop reason: HOSPADM

## 2022-12-16 RX ORDER — CEFTRIAXONE 2 G/1
2 INJECTION, POWDER, FOR SOLUTION INTRAMUSCULAR; INTRAVENOUS EVERY 24 HOURS
Qty: 140 ML | Refills: 0 | Status: SHIPPED | OUTPATIENT
Start: 2022-12-16 | End: 2022-12-16

## 2022-12-16 RX ORDER — HEPARIN SODIUM,PORCINE 10 UNIT/ML
5 VIAL (ML) INTRAVENOUS
Status: CANCELLED | OUTPATIENT
Start: 2022-12-16

## 2022-12-16 RX ORDER — SULFAMETHOXAZOLE/TRIMETHOPRIM 800-160 MG
1 TABLET ORAL 2 TIMES DAILY
Qty: 70 TABLET | Refills: 0 | Status: SHIPPED | OUTPATIENT
Start: 2022-12-23 | End: 2023-01-11

## 2022-12-16 RX ADMIN — VANCOMYCIN HYDROCHLORIDE 1250 MG: 10 INJECTION, POWDER, LYOPHILIZED, FOR SOLUTION INTRAVENOUS at 00:07

## 2022-12-16 RX ADMIN — VANCOMYCIN HYDROCHLORIDE 2000 MG: 1 INJECTION, POWDER, LYOPHILIZED, FOR SOLUTION INTRAVENOUS at 12:10

## 2022-12-16 RX ADMIN — METHOCARBAMOL 750 MG: 750 TABLET ORAL at 09:13

## 2022-12-16 RX ADMIN — ACETAMINOPHEN 975 MG: 325 TABLET, FILM COATED ORAL at 04:27

## 2022-12-16 RX ADMIN — ACETAMINOPHEN 975 MG: 325 TABLET, FILM COATED ORAL at 12:09

## 2022-12-16 RX ADMIN — METHOCARBAMOL 750 MG: 750 TABLET ORAL at 15:58

## 2022-12-16 RX ADMIN — HYDROXYZINE HYDROCHLORIDE 25 MG: 25 TABLET, FILM COATED ORAL at 09:13

## 2022-12-16 RX ADMIN — HYDROXYZINE HYDROCHLORIDE 25 MG: 25 TABLET, FILM COATED ORAL at 15:58

## 2022-12-16 RX ADMIN — CEFTRIAXONE SODIUM 2 G: 2 INJECTION, POWDER, FOR SOLUTION INTRAMUSCULAR; INTRAVENOUS at 15:57

## 2022-12-16 ASSESSMENT — ACTIVITIES OF DAILY LIVING (ADL)
ADLS_ACUITY_SCORE: 20
ADLS_ACUITY_SCORE: 18
ADLS_ACUITY_SCORE: 18
ADLS_ACUITY_SCORE: 20
ADLS_ACUITY_SCORE: 18
ADLS_ACUITY_SCORE: 18
DEPENDENT_IADLS:: INDEPENDENT
ADLS_ACUITY_SCORE: 18
ADLS_ACUITY_SCORE: 18
ADLS_ACUITY_SCORE: 20

## 2022-12-16 NOTE — CONSULTS
"Care Management Discharge Note    Discharge Date: 12/16/2022     Discharge Disposition: Home    Discharge Services: Outpatient infusion for IV abx     Discharge DME: None    Discharge Transportation: car, drives self    Private pay costs discussed: Not applicable    Education Provided on the Discharge Plan: yes   Persons Notified of Discharge Plans: patient   Patient/Family in Agreement with the Plan: yes    Handoff Referral Completed: Yes    Additional Information:  Per notes, patient \"is a 62yo M with left index finger FDP repair in zone 1 complicated by surgical site infection and P3 osteomyelitis s/p I&D left index finger and nail removal with nailbed stenting  on 12/14/2022 with Dr. Cleaning\".     Per ID, patient will need ceftriaxone 2g q24h for suspected MSSA osteomyelitis and surgical site infection until 12/23/22. \"No need for monitoring labs or ID OPAT follow-up given short course of therapy\". Made a referral to [facility] ([facility]) for an insurance benefit check and they are out of network for his insurance. [facility] sent the referral to [facility]. Spoke to [facility] Care liaison, Kim ([phone]) and she will be watching for the referral. Per Kim, patient will need an appointment in a clinic to remove the line after IV abx are complete.     Met with patient to introduce self/role and complete initial assessment. Patient lives at home with his spouse and is independent at baseline. His spouse assists with cooking and cleaning when needed. Discussed home infusion and he prefers to go to an [facility] daily. Updated Kim at [facility] that referral can be cancelled. Spoke to Yuli at [facility] and she scheduled appointments for patient. He will go to their Emergency Department ([facility]) on Saturday and Sunday at 1400 (these appointments will not show up on his AVS- patient is aware of appointment details). Patient will need to get his abx dose today prior " to discharge- updated bedside and charge nurse. Discussed the Important Message from Medicare (IMM) form. Patient signed and declined a copy.     Ortho NP put in a therapy plan for the IV abx. Per Yuli, if they want the midline to be removed after abx are complete, it needs to be included in the order. Notified Ortho NP.      2:26 PM  Patient would like his appointment on 12/21/22 to be changed to 1600 due to having a follow up Ortho appointment at 13:15 at the [facility]. Called [facility] and they aren't able to do a 1600 appointment due to closing at 1630. Called the [facility] [place] (where his ortho appointment is). They were able to schedule him for a 1430 appointment. Informed patient about his appointment details.       [address] [address], [place] [address]   [phone]  Infusion- [phone]      [facility] (appointment on 12/21/22)   [facility], [address] [place] [address], [address], [place], [place], [address]  [phone]    DANYELLE Perez RNCC  RN Care Coordinator   Office: [phone]   Pager: [phone]

## 2022-12-16 NOTE — PROCEDURES
[facility]    Single Lumen Midline Placement    Date/Time: 12/16/2022 8:48 AM  Performed by: Argenis Weldon RN  Authorized by: Sharita Butler MD   Indications: vascular access      UNIVERSAL PROTOCOL   Site Marked: Yes  Prior Images Obtained and Reviewed:  NA  Required items: Required blood products, implants, devices and special equipment available    Patient identity confirmed:  Verbally with patient, arm band, provided demographic data and hospital-assigned identification number  NA - No sedation, light sedation, or local anesthesia  Confirmation Checklist:  Patient's identity using two indicators, relevant allergies, procedure was appropriate and matched the consent or emergent situation and correct equipment/implants were available  Time out: Immediately prior to the procedure a time out was called    Universal Protocol: the Joint Commission Universal Protocol was followed    Preparation: Patient was prepped and draped in usual sterile fashion    ESBL (mL):  0    SEDATION    Patient Sedated: No        Preparation: skin prepped with ChloraPrep  Skin prep agent: skin prep agent completely dried prior to procedure  Hand hygiene: hand hygiene performed prior to central venous catheter insertion  Type of line used: Midline  Catheter type: single lumen  Lumen type: non-valved  Catheter size: 22 g  Brand: Arrow  Lot number: [id number]  Placement method: venipuncture  Number of attempts: 1  Difficulty threading catheter: no  Successful placement: yes  Orientation: right    Location: cephalic vein  Tip Location: distal to axillary vein  Arm circumference: adults 15 cm  Extremity circumference: 32  Visible catheter length: 0  Total catheter length: 6  Dressing and securement: transparent securement dressing, alcohol impregnated caps, blood cleaned with CHG, chlorhexidine disc applied, dressing applied, line secured, occlusive dressing applied and site  cleansed  Post procedure assessment: blood return through all ports and free fluid flow  PROCEDURE   Patient Tolerance:  Patient tolerated the procedure well with no immediate complicationsDescribe Procedure: SL non valved midline placed and ready for immediate use
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
## 2022-12-16 NOTE — CONSULTS
Per ID note 12/15/22 patient will receive 7 days of antibiotics IV (on day 2) then switch to po.    Single lumen midline placed for continued nonvesicant antibiotic therapy.

## 2022-12-16 NOTE — PROGRESS NOTES
SPIRITUAL HEALTH SERVICES  SPIRITUAL ASSESSMENT Progress Note  [facility] ([facility]) 5 A ORTHO R 0551 12/16/22     REFERRAL SOURCE: Self Referral    Pt declined visit with Unit .  He stated he prayed last night with one of his nurses. So today he did not have any spiritual needs.    PLAN: No follow up necessary.    Yaya Umanzor MA, MPA  Associate   Pager: [phone]

## 2022-12-16 NOTE — PROGRESS NOTES
Pt received IV antibiotic, atarax and robaxin prior to discharge. Observed pt self perform dressing change to L index finger with guidance over phone from provider. Pt reports he is comfortable with dressing change. Provided dressing change supplies. Denies SOB, CP, N/T. VSS. Ready to discharge. Plans to drive self home. Up ad nehemiah.

## 2022-12-16 NOTE — PROGRESS NOTES
Orthopaedic Surgery Progress Note 12/16/2022    S: No orthopedic changes overnight.  Afebrile, hemodynamically stable.  Tolerating dressing changes.  Evaluated by the infectious disease team yesterday, PICC line will be placed.  Discharged with vancomycin and ceftriaxone.  MRSA swab negative.    O:  Temp: (!) 96.4  F (35.8  C) Temp src: Oral BP: 107/63 Pulse: 63   Resp: 17 SpO2: (P) 97 % O2 Device: (P) None (Room air)      Exam:  Gen: No acute distress, resting comfortably in bed.  Resp: Non-labored breathing  MSK:  LUE:  - Dressings c/d/i with exchange at the bedside.  - Well-appearing surgical site, no purulence.  No drainage.  - SILT medial/radial/ulnar/axillary nerves  - Fires EPL, FPL, Intrinsics  - Radial pulse 2+, hand wwp        Recent Labs   Lab 12/13/22  0945   WBC 5.6   HGB 14.8      CRP 10.6*     Sed Rate   Date Value Ref Range Status   01/21/2021 3 0 - 20 mm/h Final       7-Day Micro Results     Collected Updated Procedure Result Status      12/15/2022 1340 12/15/2022 1740 MRSA MSSA PCR, Nasal Swab [[id number]]    Swab from Nares, Bilateral    Final result Component Value   MRSA Target DNA Negative   SA Target DNA Negative            12/14/2022 1901 12/16/2022 0331 Anaerobic Bacterial Culture Routine [[id number]]   Abscess from Finger, Left    Preliminary result Component Value   Culture No anaerobic organisms isolated after 1 day  [P]                12/14/2022 1901 12/16/2022 0331 Fungal or Yeast Culture Routine [[id number]]   Abscess from Finger, Left    Preliminary result Component Value   Culture No growth after 1 day  [P]                12/14/2022 1901 12/15/2022 1350 Abscess Aerobic Bacterial Culture Routine [[id number]]   Abscess from Finger, Left    Preliminary result Component Value   Culture Culture in progress  [P]                        Assessment/Plan: Shashi Raza is a 60yo M with left index finger FDP repair in zone 1 complicated by surgical site infection and P3  osteomyelitis s/p I&D left index finger and nail removal with nailbed stenting  on 12/14/2022 with Dr. Cleaning.     Today's plan  - PICC line to be placed  - We will discharge on vancomycin and ceftriaxone; please see ID note from 12/15  - Continue twice daily wet-to-dry dressing changes     Activity: Up with assist until independent  Weight bearing status: WBAT, avoid using left index finger  Antibiotics: Vancomycin and ceftriaxone, follow ID recs  Diet: Regular. Bowel regimen. Anti-emetics PRN.    DVT prophylaxis: Mechanical, ambulation  Wound Care: Wet to dry dressing BID starting POD1  Drains: none, wound is packed with iodine packing gauze and loosely sutured with 2 stitches.   Pain management: Orals PRN, IV for breakthrough only  X-rays: None needed  Physical Therapy: Mobilization, ROM, ADL's  Occupational Therapy: ADL's  Consults: PT, OT, ID. Appreciate assistance in caring for this patient throughout the perioperative period  Disposition: Okay to discharge once PICC line has been placed, anticipate discharge 12/16    Ramirez Hernandez MD

## 2022-12-16 NOTE — PLAN OF CARE
Goal Outcome Evaluation:      Plan of Care Reviewed With: patient    Overall Patient Progress: improvingOverall Patient Progress: improving    Outcome Evaluation: Pt denies distress only took Tylenol that was due teaching on pain medication done and pt verbal understanding.    Patient vital signs are at baseline: Yes  Patient able to ambulate as they were prior to admission or with assist devices provided by therapies during their stay:  Yes  Patient MUST void prior to discharge:  Yes  Patient able to tolerate oral intake:  Yes  Pain has adequate pain control using Oral analgesics:  Yes  Does patient have an identified :  Yes  Has goal D/C date and time been discussed with patient:  Yes    VS: Temp: (!) 96.1  F (35.6  C) Temp src: Oral BP: 108/72 Pulse: 62   Resp: 16 SpO2: 96 % O2 Device: None (Room air)      O2:  stable on RA. LS clear and equal bilaterally. Denies chest pain and SOB.    Output: Voids spontaneously without difficulty to bathroom    Last BM: BM 12/15/22 denies abdominal discomfort. BS active / passing flatus.    Activity: Up stand by ad nehemiah   Skin: WDL except, left hand incision   Pain: Pain managed with Tylenol and PRN   CMS: Intact, AOx4. Denies numbness and tingling Comes on and off it is chronic   Dressing: Changed 2x a day C/D/I   Diet: Regular diet. Denies nausea/vomiting.      LDA: PIV SL / infusing   Equipment: IV pole, personal belongings,    Plan: FALL  precautions maintained / Continue with plan of care. Call light within reach, pt able to make needs known.    Additional Info: Pt walked on hallway standby

## 2022-12-16 NOTE — PROGRESS NOTES
DISCHARGE SUMMARY    Pt discharging to: home  Transportation: wheelchair to pt car in green ramp  AVS given and discussed: yes  Stoplight Tool given and discussed: yes  Medications given: yes, from pharmacy and security  Belongings returned: yes  Comments: tolerated well, questions answered.

## 2022-12-16 NOTE — PLAN OF CARE
"  VS: /72   Pulse 62   Temp (!) 96.1  F (35.6  C)   Resp 16   Ht 1.778 m (5' 10\")   Wt 91.7 kg (202 lb 2.6 oz)   SpO2 96%   BMI 29.01 kg/m      O2: Stable on Room air, Denies chest pain and SOB   Output: Voided without difficulties   Last BM: 12/16/2022   Activity: Walks Independently   Skin: WDL, except Left index surgical incision   Pain: Managed with PRN   CMS: Baseline Numbness and tingling comes and goes (chronic)   Dressing: Changed by Orth, Intact, clean   Diet: Regular Diet, Denies N/V   LDA: PICC line was placed, PIV SL was removed   Equipment: Call light within reach   Plan: Will discharge to home after 4:00PM IV ceftriaxone is transfused. Security belongings in patient chart. Discharge medication in medication room, does not want to go home with his oxycodone- pharmacy aware and will collect. Ortho cleared patient to drive home.    Additional Info: A&Ox4. MRSA swab is negative. Denies headache, chest pain, SOB. Uses call light appropriately and makes needs known.               "

## 2022-12-16 NOTE — PHARMACY-VANCOMYCIN DOSING SERVICE
Pharmacy Vancomycin Note  Date of Service 2022  Patient's  1960   61 year old, male    Indication: Osteomyelitis and Skin and Soft Tissue Infection  Day of Therapy: started 12/15/22  Current vancomycin regimen:  1250 mg IV q12h  Current vancomycin monitoring method: AUC  Current vancomycin therapeutic monitoring goal: 400-600 mg*h/L    Insight[employer] Prediction of Current Vancomycin Regimen    Regimen: 1250 mg IV every 12 hours.  Exposure target: AUC24 (range)400-600 mg/L.hr   AUC24,ss: 566 mg/L.hr  Probability of AUC24 > 400: 95 %  Ctrough,ss: 18.4 mg/L  Probability of Ctrough,ss > 20: 40 %  Probability of nephrotoxicity (Lodise PHOEBE ): 15 %      Current estimated CrCl = Estimated Creatinine Clearance: 92.6 mL/min (based on SCr of 0.93 mg/dL).    Creatinine for last 3 days  2022:  8:58 PM Creatinine 0.81 mg/dL  12/15/2022:  8:09 AM Creatinine 0.96 mg/dL  2022:  7:29 AM Creatinine 0.93 mg/dL    Recent Vancomycin Levels (past 3 days)  2022:  7:29 AM Vancomycin 17.1 mg/L    Vancomycin IV Administrations (past 72 hours)                   vancomycin (VANCOCIN) 1,250 mg in 0.9% NaCl 250 mL intermittent infusion (mg) 1,250 mg New Bag 22 0007     1,250 mg New Bag 12/15/22 1125    vancomycin (VANCOCIN) 2,000 mg in sodium chloride 0.9 % 500 mL intermittent infusion (mg) 2,000 mg New Bag 22 2237                Nephrotoxins and other renal medications (From now, onward)    Start     Dose/Rate Route Frequency Ordered Stop    12/15/22 1100  vancomycin (VANCOCIN) 1,250 mg in 0.9% NaCl 250 mL intermittent infusion         1,250 mg  over 90 Minutes Intravenous EVERY 12 HOURS 12/15/22 0212               Contrast Orders - past 72 hours (72h ago, onward)    None          Interpretation of levels and current regimen:  Vancomycin level is reflective of -600 approaching higher end of goal. Will decrease dose to remain in goal and decrease nephrotoxicity risk.     Has serum  creatinine changed greater than 50% in last 72 hours: No    Urine output:  unable to determine    Renal Function: Stable    InsightRX Prediction of Planned New Vancomycin Regimen    Regimen: 2000 mg IV every 24 hours.  Exposure target: AUC24 (range)400-600 mg/L.hr   AUC24,ss: 461 mg/L.hr  Probability of AUC24 > 400: 73 %  Ctrough,ss: 11.9 mg/L  Probability of Ctrough,ss > 20: 11 %  Probability of nephrotoxicity (Lodise PHOEBE 2009): 7 %      Plan:  1. Decrease total daily dose to vancomycin 2000 mg (21.8 mg/kg) IV q24h (plan and note update to q24h regimen d/t possible discharge on IV abx)   2. Vancomycin monitoring method: AUC  3. Vancomycin therapeutic monitoring goal: 400-600 mg*h/L  4. Pharmacy will check vancomycin levels as appropriate in 3-5 Days.  5. Serum creatinine levels will be ordered a minimum of twice weekly.    Kenneth Ruff RPH

## 2022-12-16 NOTE — PHARMACY
[facility]  Parenteral ANtibiotic Review at Departure from Acute Care [facility]     Antimicrobial Stewardship Program - A joint venture between [facility] and  Physicians to optimize antibiotic management.  NOT a formal consult - Restricted Antimicrobial Review     Patient: Shashi Raza  MRN: [id number]  Allergies: Codeine    Brief Summary: Shashi Raza is a 61 year old male with PMHx of HLD and chronic lower back pain secondary to spinal stenosis who suffered a distal L index finger laceration on 9/14/2022 while using a . He was seen at an OSH ED that same day, managed by bedside laceration repair and Augmentin x5 days. There was concern for flexor tendon laceration, so he was referred to orthopedic surgery. Orthopedic exam on 9/20/22 was consistent with complete transection of the tendon, and the decision was made to pursue surgical repair, which occurred on 9/23/22. Follow-up visits in October with orthopedics were uneventful, showing good healing of the surgical site and no pain or functional complaints document from the patient. At follow-up on 11/22/22, a single drop of purulence was noted to be expressed from under the nail of the affected digit and there was minimal tissue swelling around the surgical site, but no other swelling and no erythema were noted. He was prescribed Bactrim for possible infection. Patient reported initial improvement in symptoms and resolution of purulence, but by 12/12/22, the purulence had returned and was apparent at three sites on the finger. At follow-up on 12/13, it was determined he needed an I&D of the finger. A plain film obtained that day demonstrated a new ill-defined lucency in the second-finger distal phalanx shaft concerning for osteomyelitis. Patient was admitted on 12/14/22 and had I&D performed the same day. Intra-operative findings included purulent fluid in the volar aspect of the  left index finger (cultures sent) and the nail was not adherent to the nail bed. Intraoperative culture preliminary with Staphylococcus aureus. Plan in place for patient to complete 7-days of ceftriaxone, then transition to oral Bactrim to complete an extended course as an outpatient.     Antimicrobial Dose/Route/Frequency Duration/Indication Start Date End Date   Ceftriaxone 2 g/IV/every 24 hours 7 days/ (L index finger osteomyelitis) 12/16/2022 12/23/2022 per ID provider progress note   Trimethoprim + Sulfamethoxazole 2 DS tabs/PO/every 12 hours 5 weeks (6 weeks total of antibiotic therapy)/ (L index finger osteomyelitis) 12/24/2022 1/27/2023     Laboratory Tests:  (No laboratory monitoring warranted strictly for IV antibiotic therapy given short outpatient duration of </= 7 days)   Lab Monitoring Frequency:  (See above)   Therapeutic Drug Monitoring:  none   Therapeutic Drug Monitoring Frequency:  none   Miscellaneous Drug Monitoring:  none     Line Type: Midline    Reassess Line/Pull Line Date: Pull midline after last ceftriaxone dose on 12/23/2022    First Dose Received in Controlled Setting: Yes    Designated Provider: Dr. Kyara Cleaning (orthopedic surgeon)    Follow-up: Patient does  have outpatient follow-up. Appointment date/time: 12/21/2022 @ 1:15 PM in [facility].    Recommendations/Additional Information:   Please follow-up intraoperative culture finalization    Taryn Hinojosa, PharmD, BCIDP  Pager: [phone]    Vital Signs/Clinical Features:  Vitals         12/14 0700  12/15 0659 12/15 0700  12/16 0659 12/16 0700  12/16 1529   Most Recent      Temp ( F) 95.6 -  98.8    95.8 -  96.4      96.1     96.1 (35.6) 12/16 0743    Pulse 68 -  96    56 -  63      62     62 12/16 0743    Resp 12 -  20    16 -  17      16     16 12/16 0743    /75 -  137/83    107/63 -  130/81      108/72     108/72 12/16 0743    SpO2 (%) 96 -  100        96     96 12/16 0743            Labs  Estimated Creatinine  Clearance: 92.6 mL/min (based on SCr of 0.93 mg/dL).  Recent Labs   Lab Test 02/24/20  0959 03/01/21  0818 03/08/22  0811 12/14/22  2058 12/15/22  0809 12/16/22  0729   CR 1.05 0.96 0.99 0.81 0.96 0.93       Recent Labs   Lab Test 04/03/18  1309 01/21/21  0843 12/13/22  0945   WBC 7.7 5.9 5.6   ANEU 4.9 3.5  --    ALYM 1.8 1.6  --    GUSTAVO 0.8 0.6  --    AEOS 0.2  --   --    HGB 16.5 16.7 14.8   HCT 47.2 48.4 43.7   MCV 90 91 90    161 205       Recent Labs   Lab Test 01/12/17  0804 02/14/18  0744 02/15/19  0735 02/24/20  0959 03/01/21  0818 03/08/22  0811   BILITOTAL 0.6 0.8 0.5 0.5 0.6 0.6   ALKPHOS 62 53 80 55 59 69   ALBUMIN 4.1 4.1 3.9 3.9 3.9 3.8   AST 22 20 23 17 20 18   ALT 34 32 35 28 27 29       Recent Labs   Lab Test 04/03/18  1309 01/21/21  0843 12/13/22  0945   CRP  --   --  10.6*   SED 2 3  --        Recent Labs   Lab Test 12/16/22  0729   VANCOMYCIN 17.1       Culture Results:  7-Day Micro Results       Procedure Component Value Units Date/Time    MRSA MSSA PCR, Nasal Swab [[id number]] Collected: 12/15/22 1340    Order Status: Completed Lab Status: Final result Updated: 12/15/22 4400    Specimen: Swab from Nares, Bilateral      MRSA Target DNA Negative     SA Target DNA Negative    Narrative:      The [employer]  Xpert SA Nasal Complete assay performed in the XTRM  Dx System is a qualitative in vitro diagnostic test designed for rapid detection of Staphylococcus aureus (SA) and methicillin-resistant Staphylococcus aureus (MRSA) from nasal swabs in patients at risk for nasal colonization. The test utilizes automated real-time polymerase chain reaction (PCR) to detect MRSA/SA DNA. The Xpert SA Nasal Complete assay is intended to aid in the prevention and control of MRSA/SA infections in healthcare settings. The assay is not intended to diagnose, guide or monitor treatment for MRSA/SA infections, or provide results of susceptibility to methicillin. A negative result does not preclude MRSA/SA  nasal colonization.     Acid-Fast Bacilli Culture and Stain [[id number]] Collected: 12/14/22 1901    Order Status: Canceled Lab Status: No result     Specimen: Abscess from Finger, Left     Narrative:      The following orders were created for panel order Acid-Fast Bacilli Culture and Stain.  Procedure                               Abnormality         Status                     ---------                               -----------         ------                     Acid-Fast Bacilli Cultur...[[id number]]                                                   Please view results for these tests on the individual orders.    Anaerobic Bacterial Culture Routine [[id number]] Collected: 12/14/22 1901    Order Status: Completed Lab Status: Preliminary result Updated: 12/16/22 0331    Specimen: Abscess from Finger, Left      Culture No anaerobic organisms isolated after 1 day    Fungal or Yeast Culture Routine [[id number]] Collected: 12/14/22 1901    Order Status: Completed Lab Status: Preliminary result Updated: 12/16/22 0331    Specimen: Abscess from Finger, Left      Culture No growth after 1 day    Abscess Aerobic Bacterial Culture Routine [[id number]]  (Abnormal) Collected: 12/14/22 1901    Order Status: Completed Lab Status: Preliminary result Updated: 12/16/22 1006    Specimen: Abscess from Finger, Left      Culture Culture in progress      2+ Staphylococcus aureus    Acid-Fast Bacilli Culture and Stain [[id number]] Collected: 12/14/22 1901    Order Status: Canceled Lab Status: No result Updated: 12/14/22 1947    Specimen: Abscess from Finger, Left             Recent Labs   Lab Test 04/03/18  1309 01/21/21  0851   URINEPH 7.0 5.0   NITRITE Negative Negative   LEUKEST Negative Negative   WBCU 0 - 5 <1                         Imaging: XR Finger LT G/E 2 vw    Result Date: 12/13/2022  FINGER LEFT TWO OR MORE VIEWS December 13, 2022 9:56 AM INDICATION: Subacute left finger injury. Infection. COMPARISON: None.      IMPRESSION: 1.  There is new ill-defined lucency in the second finger distal phalanx shaft, suspicious for osteomyelitis. 2.  Right second finger soft tissue swelling, new. 3.  No fracture or joint malalignment. 4.  Mild second MCP degenerative arthrosis, stable. PB MORA MD   SYSTEM ID:  [id number]

## 2022-12-17 ENCOUNTER — HOSPITAL ENCOUNTER (EMERGENCY)
Facility: CLINIC | Age: 62
Discharge: HOME | End: 2022-12-17
Admitting: EMERGENCY MEDICINE
Payer: COMMERCIAL

## 2022-12-17 VITALS
DIASTOLIC BLOOD PRESSURE: 76 MMHG | HEART RATE: 75 BPM | RESPIRATION RATE: 16 BRPM | SYSTOLIC BLOOD PRESSURE: 135 MMHG | OXYGEN SATURATION: 97 %

## 2022-12-17 DIAGNOSIS — L08.9 FINGER INFECTION: ICD-10-CM

## 2022-12-17 PROCEDURE — 250N000011 HC RX IP 250 OP 636: Performed by: CLINICAL NURSE SPECIALIST

## 2022-12-17 PROCEDURE — 999N000104 HC STATISTIC NO CHARGE: Performed by: EMERGENCY MEDICINE

## 2022-12-17 PROCEDURE — 96365 THER/PROPH/DIAG IV INF INIT: CPT | Performed by: EMERGENCY MEDICINE

## 2022-12-17 RX ORDER — CEFTRIAXONE 2 G/1
2 INJECTION, POWDER, FOR SOLUTION INTRAMUSCULAR; INTRAVENOUS ONCE
Status: COMPLETED | OUTPATIENT
Start: 2022-12-17 | End: 2022-12-17

## 2022-12-17 RX ORDER — CEFTRIAXONE 2 G/1
2 INJECTION, POWDER, FOR SOLUTION INTRAMUSCULAR; INTRAVENOUS ONCE
Status: CANCELLED
Start: 2022-12-17 | End: 2022-12-17

## 2022-12-17 RX ORDER — HEPARIN SODIUM (PORCINE) LOCK FLUSH IV SOLN 100 UNIT/ML 100 UNIT/ML
5 SOLUTION INTRAVENOUS
Status: CANCELLED | OUTPATIENT
Start: 2022-12-17

## 2022-12-17 RX ORDER — MEPERIDINE HYDROCHLORIDE 25 MG/ML
25 INJECTION INTRAMUSCULAR; INTRAVENOUS; SUBCUTANEOUS EVERY 30 MIN PRN
Status: CANCELLED | OUTPATIENT
Start: 2022-12-17

## 2022-12-17 RX ORDER — EPINEPHRINE 1 MG/ML
0.3 INJECTION, SOLUTION INTRAMUSCULAR; SUBCUTANEOUS EVERY 5 MIN PRN
Status: CANCELLED | OUTPATIENT
Start: 2022-12-17

## 2022-12-17 RX ORDER — ALBUTEROL SULFATE 90 UG/1
1-2 AEROSOL, METERED RESPIRATORY (INHALATION)
Status: CANCELLED
Start: 2022-12-17

## 2022-12-17 RX ORDER — DIPHENHYDRAMINE HYDROCHLORIDE 50 MG/ML
50 INJECTION INTRAMUSCULAR; INTRAVENOUS
Status: CANCELLED
Start: 2022-12-17

## 2022-12-17 RX ORDER — METHYLPREDNISOLONE SODIUM SUCCINATE 125 MG/2ML
125 INJECTION, POWDER, LYOPHILIZED, FOR SOLUTION INTRAMUSCULAR; INTRAVENOUS
Status: CANCELLED
Start: 2022-12-17

## 2022-12-17 RX ORDER — HEPARIN SODIUM,PORCINE 10 UNIT/ML
5 VIAL (ML) INTRAVENOUS
Status: CANCELLED | OUTPATIENT
Start: 2022-12-17

## 2022-12-17 RX ORDER — ALBUTEROL SULFATE 0.83 MG/ML
2.5 SOLUTION RESPIRATORY (INHALATION)
Status: CANCELLED | OUTPATIENT
Start: 2022-12-17

## 2022-12-17 RX ADMIN — CEFTRIAXONE SODIUM 2 G: 2 INJECTION, POWDER, FOR SOLUTION INTRAMUSCULAR; INTRAVENOUS at 14:19

## 2022-12-17 ASSESSMENT — ACTIVITIES OF DAILY LIVING (ADL)
ADLS_ACUITY_SCORE: 35
ADLS_ACUITY_SCORE: 35

## 2022-12-17 NOTE — DISCHARGE SUMMARY
Physician Discharge Summary     Patient ID:  Shashi Raza  [id number]  61 year old  1960    Admit date: 12/14/2022    Discharge date and time: 12/16/2022  5:16 PM     Admitting Physician: Kyara Lo MD     Discharge Physician: KYARA LO MD    Admission Diagnoses: Surgical site infection [T81.49XA]  Stiffness of left hand joint [M25.642]    Discharge Diagnoses: same    Admission Condition: stable    Discharged Condition: stable    Indication for Admission: surgical site infection    Hospital Course: 61 year old male with surgical site infection left index finger after prior zone 1 FDP tendon repair. He was admitted for surgical debridement of the infection on 12/14/2022 with postoperative antibiotics. A PICC line was placed for home IV antibiotics as per infectious disease recommendations.    Consults: ID    Significant Diagnostic Studies: microbiology: wound culture: positive for 2+ staph aureus    Treatments: antibiotics: vancomycin (now discontinued), zosyn (now discontinued), and ceftriaxone and surgery: left index finger incision and drainage, bone debridement    Discharge Exam:  No acute distress  Left index finger without evidence of purulence, improving erythema    Disposition: home    Patient Instructions:   Discharge Medication List as of 12/16/2022  3:11 PM      START taking these medications    Details   acetaminophen (TYLENOL) 325 MG tablet Take 2 tablets (650 mg) by mouth every 4 hours as needed for other, Disp-60 tablet, R-0, E-Prescribe      calcium carbonate (TUMS) 500 MG chewable tablet Take 1-2 tablets (500-1,000 mg) by mouth daily as needed for heartburn, Disp-45 tablet, R-0, E-Prescribe      hydrOXYzine (ATARAX) 25 MG tablet Take 1 tablet (25 mg) by mouth every 6 hours as needed for other (adjuvant pain), Disp-40 tablet, R-0, E-Prescribe      oxyCODONE (ROXICODONE) 5 MG tablet Take 1 tablet (5 mg) by mouth every 6 hours as needed for pain, Disp-12 tablet, R-0, E-Prescribe       polyethylene glycol (MIRALAX) 17 g packet Take 17 g by mouth daily, Disp-10 packet, R-0, E-Prescribe      senna-docusate (SENOKOT-S/PERICOLACE) 8.6-50 MG tablet Take 1 tablet by mouth 2 times daily, Disp-30 tablet, R-0, E-Prescribe         CONTINUE these medications which have CHANGED    Details   cefTRIAXone (ROCEPHIN) 2 GM vial Inject 2 g into the vein every 24 hours, Disp-140 mL, R-0, E-Prescribe      methocarbamol (ROBAXIN) 750 MG tablet Take 1 tablet (750 mg) by mouth every 6 hours as needed for muscle spasms, Disp-30 tablet, R-0, E-Prescribe      sulfamethoxazole-trimethoprim (BACTRIM DS) 800-160 MG tablet Take 1 tablet by mouth 2 times daily for 35 days, Disp-70 tablet, R-0, E-Prescribe         CONTINUE these medications which have NOT CHANGED    Details   atorvastatin (LIPITOR) 20 MG tablet Take 1 tablet (20 mg) by mouth daily, Disp-90 tablet, R-3, E-Prescribe      gabapentin (NEURONTIN) 300 MG capsule TAKE ONE CAPSULE BY MOUTH THREE TIMES A DAY, Disp-90 capsule, R-1, E-Prescribe      predniSONE (DELTASONE) 20 MG tablet TAKE THREE TABLETS BY MOUTH ONCE DAILY FOR 3 DAYS, THEN TAKE TWO TABLETS BY MOUTH ONCE DAILY FOR 3 DAYS, THEN TAKE ONE TABLET BY MOUTH ONCE DAILY FOR 3 DAYS, Disp-18 tablet, R-1, E-Prescribe           Activity: ambulate in house  Diet: regular diet  Wound Care: dressing change daily    Follow-up with Dr. Lo in 1 week.    Signed:  BA LO MD  12/17/2022  11:55 AM

## 2022-12-18 ENCOUNTER — HOSPITAL ENCOUNTER (EMERGENCY)
Facility: CLINIC | Age: 62
Discharge: HOME | End: 2022-12-18
Admitting: EMERGENCY MEDICINE
Payer: COMMERCIAL

## 2022-12-18 VITALS
DIASTOLIC BLOOD PRESSURE: 74 MMHG | HEART RATE: 89 BPM | TEMPERATURE: 97.7 F | SYSTOLIC BLOOD PRESSURE: 127 MMHG | RESPIRATION RATE: 16 BRPM

## 2022-12-18 DIAGNOSIS — L08.9 FINGER INFECTION: ICD-10-CM

## 2022-12-18 LAB — BACTERIA ABSC ANAEROBE+AEROBE CULT: ABNORMAL

## 2022-12-18 PROCEDURE — 96365 THER/PROPH/DIAG IV INF INIT: CPT | Performed by: EMERGENCY MEDICINE

## 2022-12-18 PROCEDURE — 250N000011 HC RX IP 250 OP 636: Performed by: CLINICAL NURSE SPECIALIST

## 2022-12-18 PROCEDURE — 999N000104 HC STATISTIC NO CHARGE: Performed by: EMERGENCY MEDICINE

## 2022-12-18 RX ORDER — EPINEPHRINE 1 MG/ML
0.3 INJECTION, SOLUTION INTRAMUSCULAR; SUBCUTANEOUS EVERY 5 MIN PRN
Status: CANCELLED | OUTPATIENT
Start: 2022-12-18

## 2022-12-18 RX ORDER — CEFTRIAXONE 2 G/1
2 INJECTION, POWDER, FOR SOLUTION INTRAMUSCULAR; INTRAVENOUS ONCE
Status: CANCELLED
Start: 2022-12-18 | End: 2022-12-18

## 2022-12-18 RX ORDER — ALBUTEROL SULFATE 90 UG/1
1-2 AEROSOL, METERED RESPIRATORY (INHALATION)
Status: CANCELLED
Start: 2022-12-18

## 2022-12-18 RX ORDER — ALBUTEROL SULFATE 0.83 MG/ML
2.5 SOLUTION RESPIRATORY (INHALATION)
Status: CANCELLED | OUTPATIENT
Start: 2022-12-18

## 2022-12-18 RX ORDER — METHYLPREDNISOLONE SODIUM SUCCINATE 125 MG/2ML
125 INJECTION, POWDER, LYOPHILIZED, FOR SOLUTION INTRAMUSCULAR; INTRAVENOUS
Status: CANCELLED
Start: 2022-12-18

## 2022-12-18 RX ORDER — HEPARIN SODIUM,PORCINE 10 UNIT/ML
5 VIAL (ML) INTRAVENOUS
Status: CANCELLED | OUTPATIENT
Start: 2022-12-18

## 2022-12-18 RX ORDER — HEPARIN SODIUM (PORCINE) LOCK FLUSH IV SOLN 100 UNIT/ML 100 UNIT/ML
5 SOLUTION INTRAVENOUS
Status: CANCELLED | OUTPATIENT
Start: 2022-12-18

## 2022-12-18 RX ORDER — CEFTRIAXONE 2 G/1
2 INJECTION, POWDER, FOR SOLUTION INTRAMUSCULAR; INTRAVENOUS ONCE
Status: COMPLETED | OUTPATIENT
Start: 2022-12-18 | End: 2022-12-18

## 2022-12-18 RX ORDER — DIPHENHYDRAMINE HYDROCHLORIDE 50 MG/ML
50 INJECTION INTRAMUSCULAR; INTRAVENOUS
Status: CANCELLED
Start: 2022-12-18

## 2022-12-18 RX ORDER — MEPERIDINE HYDROCHLORIDE 25 MG/ML
25 INJECTION INTRAMUSCULAR; INTRAVENOUS; SUBCUTANEOUS EVERY 30 MIN PRN
Status: CANCELLED | OUTPATIENT
Start: 2022-12-18

## 2022-12-18 RX ADMIN — CEFTRIAXONE SODIUM 2 G: 2 INJECTION, POWDER, FOR SOLUTION INTRAMUSCULAR; INTRAVENOUS at 14:35

## 2022-12-18 ASSESSMENT — ACTIVITIES OF DAILY LIVING (ADL): ADLS_ACUITY_SCORE: 35

## 2022-12-19 ENCOUNTER — INFUSION THERAPY VISIT (OUTPATIENT)
Dept: INFUSION THERAPY | Facility: CLINIC | Age: 62
End: 2022-12-19
Attending: RADIOLOGY
Payer: COMMERCIAL

## 2022-12-19 VITALS
RESPIRATION RATE: 20 BRPM | SYSTOLIC BLOOD PRESSURE: 117 MMHG | OXYGEN SATURATION: 96 % | DIASTOLIC BLOOD PRESSURE: 78 MMHG | TEMPERATURE: 98.1 F | HEART RATE: 87 BPM

## 2022-12-19 DIAGNOSIS — L08.9 FINGER INFECTION: Primary | ICD-10-CM

## 2022-12-19 PROCEDURE — 96365 THER/PROPH/DIAG IV INF INIT: CPT

## 2022-12-19 PROCEDURE — 250N000011 HC RX IP 250 OP 636: Performed by: CLINICAL NURSE SPECIALIST

## 2022-12-19 RX ORDER — HEPARIN SODIUM (PORCINE) LOCK FLUSH IV SOLN 100 UNIT/ML 100 UNIT/ML
5 SOLUTION INTRAVENOUS
Status: CANCELLED | OUTPATIENT
Start: 2022-12-19

## 2022-12-19 RX ORDER — CEFTRIAXONE 2 G/1
2 INJECTION, POWDER, FOR SOLUTION INTRAMUSCULAR; INTRAVENOUS ONCE
Status: CANCELLED
Start: 2022-12-19 | End: 2022-12-19

## 2022-12-19 RX ORDER — CEFTRIAXONE 2 G/1
2 INJECTION, POWDER, FOR SOLUTION INTRAMUSCULAR; INTRAVENOUS ONCE
Status: COMPLETED | OUTPATIENT
Start: 2022-12-19 | End: 2022-12-19

## 2022-12-19 RX ORDER — ALBUTEROL SULFATE 0.83 MG/ML
2.5 SOLUTION RESPIRATORY (INHALATION)
Status: CANCELLED | OUTPATIENT
Start: 2022-12-19

## 2022-12-19 RX ORDER — DIPHENHYDRAMINE HYDROCHLORIDE 50 MG/ML
50 INJECTION INTRAMUSCULAR; INTRAVENOUS
Status: CANCELLED
Start: 2022-12-19

## 2022-12-19 RX ORDER — HEPARIN SODIUM,PORCINE 10 UNIT/ML
5 VIAL (ML) INTRAVENOUS
Status: CANCELLED | OUTPATIENT
Start: 2022-12-19

## 2022-12-19 RX ORDER — EPINEPHRINE 1 MG/ML
0.3 INJECTION, SOLUTION INTRAMUSCULAR; SUBCUTANEOUS EVERY 5 MIN PRN
Status: CANCELLED | OUTPATIENT
Start: 2022-12-19

## 2022-12-19 RX ORDER — METHYLPREDNISOLONE SODIUM SUCCINATE 125 MG/2ML
125 INJECTION, POWDER, LYOPHILIZED, FOR SOLUTION INTRAMUSCULAR; INTRAVENOUS
Status: CANCELLED
Start: 2022-12-19

## 2022-12-19 RX ORDER — ALBUTEROL SULFATE 90 UG/1
1-2 AEROSOL, METERED RESPIRATORY (INHALATION)
Status: CANCELLED
Start: 2022-12-19

## 2022-12-19 RX ORDER — MEPERIDINE HYDROCHLORIDE 25 MG/ML
25 INJECTION INTRAMUSCULAR; INTRAVENOUS; SUBCUTANEOUS EVERY 30 MIN PRN
Status: CANCELLED | OUTPATIENT
Start: 2022-12-19

## 2022-12-19 RX ADMIN — CEFTRIAXONE SODIUM 2 G: 2 INJECTION, POWDER, FOR SOLUTION INTRAMUSCULAR; INTRAVENOUS at 15:11

## 2022-12-19 NOTE — PROGRESS NOTES
Infusion Nursing Note:  Shashi SOTOMAYOR Raza presents today for IV Rocephin.    Patient seen by provider today: No   present during visit today: Not Applicable.    Note: Pt here today IV antibiotic infusion.  Pt's finger is wrapped today.  He has been doing the dressing changes.  States that he has been feeling some burning in the wound area.  No other concerns today. .    Intravenous Access:  Midline.    Treatment Conditions:  Not Applicable.    Post Infusion Assessment:  Patient tolerated infusion without incident.     Discharge Plan:   Patient discharged in stable condition accompanied by: self.  Departure Mode: Ambulatory.  Pt has appointment to return tomorrow.       Randi Larsen RN

## 2022-12-19 NOTE — PROGRESS NOTES
Date of Injury: 9/14/2022  Mechanism of Injury:  laceration  Diagnosis: left index finger FDP laceration; surgical site infection with osteomyelitis of P3 and abscess    Date of Surgery: 9/23/2022  Surgery: left index finger FDP repair in zone 1    Date of Surgery: 12/14/2022  Surgery: left index finger and distal phalanx I&D    Shashi Raza is a 61 year old male presenting for post-operative evaluation on a video visit.    Interval: presents for first postoperative visit after I&D. He is taking IV ceftriaxone until 12/23/2022, after which he will transition to Bactrim until 1/27/2023. He reports no fevers or chills. Only bloody drainage from the wound. Doing dressing changes 1-2x daily.    Physical Examination:  There were no vitals filed for this visit.  There is no height or weight on file to calculate BMI.    Left index finger  Wound appears non infected without erythema, bloody drainage  Able to flex and extend index finger tip  Sensation intact at the tip of the left index finger  Intact capillary refill      Imaging/Studies:   XR left index finger obtained 12/13/2022 with lucency in P3 consistent with osteomyelitis    7-Day Micro Results     Collected Updated Procedure Result Status      12/15/2022 1340 12/15/2022 1740 MRSA MSSA PCR, Nasal Swab [[id number]]    Swab from Nares, Bilateral    Final result Component Value   MRSA Target DNA Negative   SA Target DNA Negative            12/14/2022 1901 12/20/2022 0331 Anaerobic Bacterial Culture Routine [[id number]]   Abscess from Finger, Left    Preliminary result Component Value   Culture No anaerobic organisms isolated after 5 days  [P]                12/14/2022 1901 12/20/2022 0331 Fungal or Yeast Culture Routine [[id number]]   Abscess from Finger, Left    Preliminary result Component Value   Culture No growth after 5 days  [P]                12/14/2022 1901 12/18/2022 0003 Abscess Aerobic Bacterial Culture Routine [[id number]]    (Abnormal)    Abscess from Finger, Left    Final result Component Value   Culture 2+ Staphylococcus aureus        Susceptibility      Staphylococcus aureus      HERB      Clindamycin <=0.25 ug/mL Susceptible      Erythromycin <=0.25 ug/mL Susceptible      Gentamicin <=0.5 ug/mL Susceptible      Oxacillin <=0.25 ug/mL Susceptible  [1]       Tetracycline <=1 ug/mL Susceptible      Trimethoprim/Sulfamethoxazole <=0.5/9.5 ug/mL Susceptible      Vancomycin 1 ug/mL Susceptible                   [1]  Oxacillin susceptible isolates are susceptible to cephalosporins (example: cefazolin and cephalexin) and beta lactam combination agents. Oxacillin resistant isolates are resistant to these agents.                       Assessment: Shashi Raza is a 61 year old male s/p left index finger FDP repair in zone 1 complicated by surgical site infection and P3 osteomyelitis, now s/p I&D    Plan:   I had a discussion with the patient regarding my clinical findings, diagnosis, and treatment plan. We reviewed the post-operative plan, frequency of follow-up, rehabilitation, and expected outcomes.  All questions answered.      PWB (< 5 lbs) left upper extremity.    OK to wash hands/shower.  Do not submerge incision. Continue daily wound packing.    Complete ceftriaxone and transition to Bactrim as prescribed.    Next Visit:    Follow-up: 2 week(s).    Imaging: None.    Plan: wound check. Instructed patient to send photos or call if increasing redness, drainage, purulence, fevers, chills, or any other concerns.      BA LO MD

## 2022-12-20 ENCOUNTER — INFUSION THERAPY VISIT (OUTPATIENT)
Dept: INFUSION THERAPY | Facility: CLINIC | Age: 62
End: 2022-12-20
Attending: RADIOLOGY
Payer: COMMERCIAL

## 2022-12-20 VITALS
DIASTOLIC BLOOD PRESSURE: 79 MMHG | TEMPERATURE: 97.7 F | RESPIRATION RATE: 16 BRPM | OXYGEN SATURATION: 97 % | WEIGHT: 200 LBS | SYSTOLIC BLOOD PRESSURE: 128 MMHG | BODY MASS INDEX: 28.7 KG/M2 | HEART RATE: 79 BPM

## 2022-12-20 DIAGNOSIS — L08.9 FINGER INFECTION: Primary | ICD-10-CM

## 2022-12-20 PROCEDURE — 96365 THER/PROPH/DIAG IV INF INIT: CPT

## 2022-12-20 PROCEDURE — 250N000011 HC RX IP 250 OP 636: Performed by: CLINICAL NURSE SPECIALIST

## 2022-12-20 RX ORDER — HEPARIN SODIUM,PORCINE 10 UNIT/ML
5 VIAL (ML) INTRAVENOUS
Status: DISCONTINUED | OUTPATIENT
Start: 2022-12-20 | End: 2022-12-20 | Stop reason: HOSPADM

## 2022-12-20 RX ORDER — EPINEPHRINE 1 MG/ML
0.3 INJECTION, SOLUTION INTRAMUSCULAR; SUBCUTANEOUS EVERY 5 MIN PRN
Status: CANCELLED | OUTPATIENT
Start: 2022-12-20

## 2022-12-20 RX ORDER — DIPHENHYDRAMINE HYDROCHLORIDE 50 MG/ML
50 INJECTION INTRAMUSCULAR; INTRAVENOUS
Status: CANCELLED
Start: 2022-12-20

## 2022-12-20 RX ORDER — HEPARIN SODIUM (PORCINE) LOCK FLUSH IV SOLN 100 UNIT/ML 100 UNIT/ML
5 SOLUTION INTRAVENOUS
Status: CANCELLED | OUTPATIENT
Start: 2022-12-20

## 2022-12-20 RX ORDER — HEPARIN SODIUM,PORCINE 10 UNIT/ML
5 VIAL (ML) INTRAVENOUS
Status: CANCELLED | OUTPATIENT
Start: 2022-12-20

## 2022-12-20 RX ORDER — HEPARIN SODIUM (PORCINE) LOCK FLUSH IV SOLN 100 UNIT/ML 100 UNIT/ML
5 SOLUTION INTRAVENOUS
Status: DISCONTINUED | OUTPATIENT
Start: 2022-12-20 | End: 2022-12-20 | Stop reason: HOSPADM

## 2022-12-20 RX ORDER — ALBUTEROL SULFATE 0.83 MG/ML
2.5 SOLUTION RESPIRATORY (INHALATION)
Status: CANCELLED | OUTPATIENT
Start: 2022-12-20

## 2022-12-20 RX ORDER — CEFTRIAXONE 2 G/1
2 INJECTION, POWDER, FOR SOLUTION INTRAMUSCULAR; INTRAVENOUS ONCE
Status: CANCELLED
Start: 2022-12-20 | End: 2022-12-20

## 2022-12-20 RX ORDER — ALBUTEROL SULFATE 90 UG/1
1-2 AEROSOL, METERED RESPIRATORY (INHALATION)
Status: CANCELLED
Start: 2022-12-20

## 2022-12-20 RX ORDER — METHYLPREDNISOLONE SODIUM SUCCINATE 125 MG/2ML
125 INJECTION, POWDER, LYOPHILIZED, FOR SOLUTION INTRAMUSCULAR; INTRAVENOUS
Status: CANCELLED
Start: 2022-12-20

## 2022-12-20 RX ORDER — CEFTRIAXONE 2 G/1
2 INJECTION, POWDER, FOR SOLUTION INTRAMUSCULAR; INTRAVENOUS ONCE
Status: COMPLETED | OUTPATIENT
Start: 2022-12-20 | End: 2022-12-20

## 2022-12-20 RX ORDER — MEPERIDINE HYDROCHLORIDE 25 MG/ML
25 INJECTION INTRAMUSCULAR; INTRAVENOUS; SUBCUTANEOUS EVERY 30 MIN PRN
Status: CANCELLED | OUTPATIENT
Start: 2022-12-20

## 2022-12-20 RX ADMIN — CEFTRIAXONE SODIUM 2 G: 2 INJECTION, POWDER, FOR SOLUTION INTRAMUSCULAR; INTRAVENOUS at 15:13

## 2022-12-20 ASSESSMENT — PAIN SCALES - GENERAL: PAINLEVEL: MODERATE PAIN (4)

## 2022-12-20 NOTE — PROGRESS NOTES
Infusion Nursing Note:  Shashi Raza presents today for Rocephin infusion.    Patient seen by provider today: No   present during visit today: Not Applicable.    Note: N/A.    Intravenous Access:  Midline.    Treatment Conditions:  Not Applicable.    Post Infusion Assessment:  Patient tolerated infusion without incident.     Discharge Plan:   Patient discharged in stable condition accompanied by: self.  Departure Mode: Ambulatory.      Neelam Metz RN

## 2022-12-21 ENCOUNTER — INFUSION THERAPY VISIT (OUTPATIENT)
Dept: INFUSION THERAPY | Facility: CLINIC | Age: 62
End: 2022-12-21
Attending: CLINICAL NURSE SPECIALIST
Payer: COMMERCIAL

## 2022-12-21 ENCOUNTER — OFFICE VISIT (OUTPATIENT)
Dept: ORTHOPEDICS | Facility: CLINIC | Age: 62
End: 2022-12-21
Payer: COMMERCIAL

## 2022-12-21 VITALS
OXYGEN SATURATION: 96 % | HEART RATE: 86 BPM | DIASTOLIC BLOOD PRESSURE: 75 MMHG | TEMPERATURE: 98.4 F | RESPIRATION RATE: 16 BRPM | SYSTOLIC BLOOD PRESSURE: 119 MMHG

## 2022-12-21 DIAGNOSIS — L08.9 FINGER INFECTION: Primary | ICD-10-CM

## 2022-12-21 DIAGNOSIS — T81.49XA SURGICAL SITE INFECTION: Primary | ICD-10-CM

## 2022-12-21 DIAGNOSIS — S56.129A FLEXOR TENDON LACERATION OF FINGER WITH OPEN WOUND, INITIAL ENCOUNTER: ICD-10-CM

## 2022-12-21 DIAGNOSIS — S61.209A FLEXOR TENDON LACERATION OF FINGER WITH OPEN WOUND, INITIAL ENCOUNTER: ICD-10-CM

## 2022-12-21 PROBLEM — M79.645 PAIN OF FINGER OF LEFT HAND: Status: RESOLVED | Noted: 2022-10-05 | Resolved: 2022-12-21

## 2022-12-21 PROBLEM — M25.642 STIFFNESS OF LEFT HAND JOINT: Status: RESOLVED | Noted: 2022-10-05 | Resolved: 2022-12-21

## 2022-12-21 PROBLEM — Z47.89 AFTERCARE FOLLOWING SURGERY OF THE MUSCULOSKELETAL SYSTEM: Status: RESOLVED | Noted: 2022-10-05 | Resolved: 2022-12-21

## 2022-12-21 PROCEDURE — 99024 POSTOP FOLLOW-UP VISIT: CPT | Performed by: STUDENT IN AN ORGANIZED HEALTH CARE EDUCATION/TRAINING PROGRAM

## 2022-12-21 PROCEDURE — 96374 THER/PROPH/DIAG INJ IV PUSH: CPT

## 2022-12-21 PROCEDURE — 250N000011 HC RX IP 250 OP 636: Performed by: CLINICAL NURSE SPECIALIST

## 2022-12-21 PROCEDURE — 250N000009 HC RX 250: Performed by: CLINICAL NURSE SPECIALIST

## 2022-12-21 RX ORDER — CEFTRIAXONE SODIUM 2 G
2 VIAL (EA) INJECTION ONCE
Status: COMPLETED | OUTPATIENT
Start: 2022-12-21 | End: 2022-12-21

## 2022-12-21 RX ORDER — DIPHENHYDRAMINE HYDROCHLORIDE 50 MG/ML
50 INJECTION INTRAMUSCULAR; INTRAVENOUS
Status: CANCELLED
Start: 2022-12-21

## 2022-12-21 RX ORDER — MEPERIDINE HYDROCHLORIDE 25 MG/ML
25 INJECTION INTRAMUSCULAR; INTRAVENOUS; SUBCUTANEOUS EVERY 30 MIN PRN
Status: CANCELLED | OUTPATIENT
Start: 2022-12-21

## 2022-12-21 RX ORDER — HEPARIN SODIUM (PORCINE) LOCK FLUSH IV SOLN 100 UNIT/ML 100 UNIT/ML
5 SOLUTION INTRAVENOUS
Status: CANCELLED | OUTPATIENT
Start: 2022-12-21

## 2022-12-21 RX ORDER — METHYLPREDNISOLONE SODIUM SUCCINATE 125 MG/2ML
125 INJECTION, POWDER, LYOPHILIZED, FOR SOLUTION INTRAMUSCULAR; INTRAVENOUS
Status: CANCELLED
Start: 2022-12-21

## 2022-12-21 RX ORDER — ALBUTEROL SULFATE 90 UG/1
1-2 AEROSOL, METERED RESPIRATORY (INHALATION)
Status: CANCELLED
Start: 2022-12-21

## 2022-12-21 RX ORDER — CEFTRIAXONE SODIUM 2 G
2 VIAL (EA) INJECTION ONCE
Status: CANCELLED
Start: 2022-12-21 | End: 2022-12-21

## 2022-12-21 RX ORDER — EPINEPHRINE 1 MG/ML
0.3 INJECTION, SOLUTION INTRAMUSCULAR; SUBCUTANEOUS EVERY 5 MIN PRN
Status: CANCELLED | OUTPATIENT
Start: 2022-12-21

## 2022-12-21 RX ORDER — HEPARIN SODIUM,PORCINE 10 UNIT/ML
5 VIAL (ML) INTRAVENOUS
Status: DISCONTINUED | OUTPATIENT
Start: 2022-12-21 | End: 2022-12-21 | Stop reason: HOSPADM

## 2022-12-21 RX ORDER — ALBUTEROL SULFATE 0.83 MG/ML
2.5 SOLUTION RESPIRATORY (INHALATION)
Status: CANCELLED | OUTPATIENT
Start: 2022-12-21

## 2022-12-21 RX ORDER — HEPARIN SODIUM,PORCINE 10 UNIT/ML
5 VIAL (ML) INTRAVENOUS
Status: CANCELLED | OUTPATIENT
Start: 2022-12-21

## 2022-12-21 RX ADMIN — CEFTRIAXONE SODIUM 2 G: 2 INJECTION, POWDER, FOR SOLUTION INTRAMUSCULAR; INTRAVENOUS at 14:25

## 2022-12-21 RX ADMIN — Medication 5 ML: at 14:29

## 2022-12-21 NOTE — LETTER
12/21/2022         RE: Shashi Rzaa  [address]  [facility] [address]        Dear Colleague,    Thank you for referring your patient, Shashi Raza, to the [facility]. Please see a copy of my visit note below.    Infusion Nursing Note:  Shashi Raza presents today for   Chief Complaint   Patient presents with     Infusion     Rocephin     Patient seen by provider today: No   present during visit today: Not Applicable.    Note: IVP Rocephin given over ~3minutes.    Intravenous Access:  Midline.    Treatment Conditions:  Not Applicable.    Post Infusion Assessment:  Patient tolerated infusion without incident.  Blood return noted pre and post infusion.  Site patent and intact, free from redness, edema or discomfort.  No evidence of extravasations.  Access discontinued per protocol.     Discharge Plan:   Discharge instructions reviewed with: Patient.  Patient and/or family verbalized understanding of discharge instructions and all questions answered.  AVS to patient via [facility].  Patient will return 12/22 at other infusion center for next appointment.   Patient discharged in stable condition accompanied by: self.  Departure Mode: Ambulatory.    Administrations This Visit     cefTRIAXone (ROCEPHIN) 2 g in 20 mL SWFI for IVP     Admin Date  12/21/2022 Action  Given Dose  2 g Route  Intravenous Administered By  Kae Shah RN          heparin lock flush 10 UNIT/ML injection 5 mL     Admin Date  12/21/2022 Action  Given Dose  5 mL Route  Intracatheter Administered By  Kae Shah RN                /75   Pulse 86   Temp 98.4  F (36.9  C) (Oral)   Resp 16   SpO2 96%     KAE SHAH RN                        Again, thank you for allowing me to participate in the care of your patient.        Sincerely,        Specialty Infusion Nurse

## 2022-12-21 NOTE — NURSING NOTE
Reason For Visit:   Chief Complaint   Patient presents with     Surgical Followup     1 wk POP left index finger I & D DOS: 12/14/22       Primary MD: Yoni Kong  Ref. MD: GUERLINE    Age: 61 year old    ?  No      There were no vitals taken for this visit.      Pain Assessment  Patient Currently in Pain: No    Hand Dominance Evaluation  Hand Dominance: Right          QuickDASH Assessment  QuickDASH Main 11/22/2022   1. Open a tight or new jar. No difficulty   2. Do heavy household chores (e.g., wash walls, floors) Moderate difficulty   3. Carry a shopping bag or briefcase. Moderate difficulty   4. Wash your back. Mild difficulty   5. Use a knife to cut food. No difficulty   6. Recreational activities in which you take some force or impact through your arm, shoulder or hand (e.g., golf, hammering, tennis, etc.). Moderate difficulty   7. During the past week, to what extent has your arm, shoulder or hand problem interfered with your normal social activities with family, friends, neighbours or groups? Slightly   8. During the past week, were you limited in your work or other regular daily activities as a result of your arm, shoulder or hand problem? Very limited   9. Arm, shoulder or hand pain. Mild   10.Tingling (pins and needles) in your arm,shoulder or hand. Severe   11. During the past week, how much difficulty have you had sleeping because of the pain in your arm, shoulder or hand? Moderate difficulty   Quickdash Ability Score 38.64          Current Outpatient Medications   Medication Sig Dispense Refill     acetaminophen (TYLENOL) 325 MG tablet Take 2 tablets (650 mg) by mouth every 4 hours as needed for other 60 tablet 0     atorvastatin (LIPITOR) 20 MG tablet Take 1 tablet (20 mg) by mouth daily (Patient taking differently: Take 20 mg by mouth At Bedtime) 90 tablet 3     calcium carbonate (TUMS) 500 MG chewable tablet Take 1-2 tablets (500-1,000 mg) by mouth daily as needed for heartburn (Patient  not taking: Reported on 12/19/2022) 45 tablet 0     cefTRIAXone (ROCEPHIN) 2 GM vial Inject 2 g into the vein every 24 hours 140 mL 0     gabapentin (NEURONTIN) 300 MG capsule TAKE ONE CAPSULE BY MOUTH THREE TIMES A DAY (Patient not taking: Reported on 12/19/2022) 90 capsule 1     hydrOXYzine (ATARAX) 25 MG tablet Take 1 tablet (25 mg) by mouth every 6 hours as needed for other (adjuvant pain) (Patient not taking: Reported on 12/19/2022) 40 tablet 0     methocarbamol (ROBAXIN) 750 MG tablet Take 1 tablet (750 mg) by mouth every 6 hours as needed for muscle spasms 30 tablet 0     polyethylene glycol (MIRALAX) 17 g packet Take 17 g by mouth daily (Patient not taking: Reported on 12/19/2022) 10 packet 0     predniSONE (DELTASONE) 20 MG tablet TAKE THREE TABLETS BY MOUTH ONCE DAILY FOR 3 DAYS, THEN TAKE TWO TABLETS BY MOUTH ONCE DAILY FOR 3 DAYS, THEN TAKE ONE TABLET BY MOUTH ONCE DAILY FOR 3 DAYS (Patient not taking: Reported on 12/19/2022) 18 tablet 1     senna-docusate (SENOKOT-S/PERICOLACE) 8.6-50 MG tablet Take 1 tablet by mouth 2 times daily (Patient not taking: Reported on 12/19/2022) 30 tablet 0     [START ON 12/23/2022] sulfamethoxazole-trimethoprim (BACTRIM DS) 800-160 MG tablet Take 1 tablet by mouth 2 times daily for 35 days 70 tablet 0       Allergies   Allergen Reactions     Codeine      Doesn't recall       JAVY Oseguera

## 2022-12-21 NOTE — LETTER
Date:December 22, 2022      Provider requested that no letter be sent. Do not send.       [facility]

## 2022-12-21 NOTE — PATIENT INSTRUCTIONS
Dear Shashi,    Thank you for choosing [facility] ([facility]) for your infusion.  The following information is a summary of our appointment as well as important reminders.      We look forward in seeing you on your next appointment here at [facility] ([facility]).  Please don t hesitate to call us at [phone] to reschedule any of your appointments or to speak with one of the [facility] registered nurses.  It was a pleasure taking care of you today.    Sincerely,    [facility]  [facility]  [address] [place] [address]  [address]  Phone:  [phone]

## 2022-12-21 NOTE — PROGRESS NOTES
Infusion Nursing Note:  Shashi СВЕТЛАНА Raza presents today for   Chief Complaint   Patient presents with     Infusion     Rocephin     Patient seen by provider today: No   present during visit today: Not Applicable.    Note: IVP Rocephin given over ~3minutes.    Intravenous Access:  Midline.    Treatment Conditions:  Not Applicable.    Post Infusion Assessment:  Patient tolerated infusion without incident.  Blood return noted pre and post infusion.  Site patent and intact, free from redness, edema or discomfort.  No evidence of extravasations.  Access discontinued per protocol.     Discharge Plan:   Discharge instructions reviewed with: Patient.  Patient and/or family verbalized understanding of discharge instructions and all questions answered.  AVS to patient via RigelT.  Patient will return 12/22 at other infusion center for next appointment.   Patient discharged in stable condition accompanied by: self.  Departure Mode: Ambulatory.    Administrations This Visit     cefTRIAXone (ROCEPHIN) 2 g in 20 mL SWFI for IVP     Admin Date  12/21/2022 Action  Given Dose  2 g Route  Intravenous Administered By  Kae Shah RN          heparin lock flush 10 UNIT/ML injection 5 mL     Admin Date  12/21/2022 Action  Given Dose  5 mL Route  Intracatheter Administered By  Kae Shah RN                /75   Pulse 86   Temp 98.4  F (36.9  C) (Oral)   Resp 16   SpO2 96%     KAE SHAH RN

## 2022-12-21 NOTE — LETTER
12/21/2022         RE: Shashi Raza  [address]  [facility] [address]        Dear Colleague,    Thank you for referring your patient, Shashi Raza, to the [facility] [place]. Please see a copy of my visit note below.    Date of Injury: 9/14/2022  Mechanism of Injury:  laceration  Diagnosis: left index finger FDP laceration; surgical site infection with osteomyelitis of P3 and abscess    Date of Surgery: 9/23/2022  Surgery: left index finger FDP repair in zone 1    Date of Surgery: 12/14/2022  Surgery: left index finger and distal phalanx I&D    Shashi Raza is a 61 year old male presenting for post-operative evaluation on a video visit.    Interval: presents for first postoperative visit after I&D. He is taking IV ceftriaxone until 12/23/2022, after which he will transition to Bactrim until 1/27/2023. He reports no fevers or chills. Only bloody drainage from the wound. Doing dressing changes 1-2x daily.    Physical Examination:  There were no vitals filed for this visit.  There is no height or weight on file to calculate BMI.    Left index finger  Wound appears non infected without erythema, bloody drainage  Able to flex and extend index finger tip  Sensation intact at the tip of the left index finger  Intact capillary refill      Imaging/Studies:   XR left index finger obtained 12/13/2022 with lucency in P3 consistent with osteomyelitis    7-Day Micro Results     Collected Updated Procedure Result Status      12/15/2022 1340 12/15/2022 1740 MRSA MSSA PCR, Nasal Swab [[id number]]    Swab from Nares, Bilateral    Final result Component Value   MRSA Target DNA Negative   SA Target DNA Negative            12/14/2022 1901 12/20/2022 0331 Anaerobic Bacterial Culture Routine [[id number]]   Abscess from Finger, Left    Preliminary result Component Value   Culture No anaerobic organisms isolated after 5 days  [P]                12/14/2022 1901 12/20/2022 0331 Fungal  or Yeast Culture Routine [[id number]]   Abscess from Finger, Left    Preliminary result Component Value   Culture No growth after 5 days  [P]                12/14/2022 1901 12/18/2022 0003 Abscess Aerobic Bacterial Culture Routine [[id number]]    (Abnormal)   Abscess from Finger, Left    Final result Component Value   Culture 2+ Staphylococcus aureus        Susceptibility      Staphylococcus aureus      HERB      Clindamycin <=0.25 ug/mL Susceptible      Erythromycin <=0.25 ug/mL Susceptible      Gentamicin <=0.5 ug/mL Susceptible      Oxacillin <=0.25 ug/mL Susceptible  [1]       Tetracycline <=1 ug/mL Susceptible      Trimethoprim/Sulfamethoxazole <=0.5/9.5 ug/mL Susceptible      Vancomycin 1 ug/mL Susceptible                   [1]  Oxacillin susceptible isolates are susceptible to cephalosporins (example: cefazolin and cephalexin) and beta lactam combination agents. Oxacillin resistant isolates are resistant to these agents.                       Assessment: Shashi Raza is a 61 year old male s/p left index finger FDP repair in zone 1 complicated by surgical site infection and P3 osteomyelitis, now s/p I&D    Plan:   I had a discussion with the patient regarding my clinical findings, diagnosis, and treatment plan. We reviewed the post-operative plan, frequency of follow-up, rehabilitation, and expected outcomes.  All questions answered.      PWB (< 5 lbs) left upper extremity.    OK to wash hands/shower.  Do not submerge incision. Continue daily wound packing.    Complete ceftriaxone and transition to Bactrim as prescribed.    Next Visit:    Follow-up: 2 week(s).    Imaging: None.    Plan: wound check. Instructed patient to send photos or call if increasing redness, drainage, purulence, fevers, chills, or any other concerns.      BA LO MD

## 2022-12-22 ENCOUNTER — INFUSION THERAPY VISIT (OUTPATIENT)
Dept: INFUSION THERAPY | Facility: CLINIC | Age: 62
End: 2022-12-22
Attending: RADIOLOGY
Payer: COMMERCIAL

## 2022-12-22 VITALS
OXYGEN SATURATION: 98 % | HEART RATE: 97 BPM | BODY MASS INDEX: 28.7 KG/M2 | TEMPERATURE: 97.9 F | SYSTOLIC BLOOD PRESSURE: 127 MMHG | WEIGHT: 200 LBS | DIASTOLIC BLOOD PRESSURE: 80 MMHG | RESPIRATION RATE: 16 BRPM

## 2022-12-22 DIAGNOSIS — L08.9 FINGER INFECTION: Primary | ICD-10-CM

## 2022-12-22 LAB — BACTERIA ABSC ANAEROBE+AEROBE CULT: NORMAL

## 2022-12-22 PROCEDURE — 250N000011 HC RX IP 250 OP 636: Performed by: CLINICAL NURSE SPECIALIST

## 2022-12-22 PROCEDURE — 96365 THER/PROPH/DIAG IV INF INIT: CPT

## 2022-12-22 RX ORDER — HEPARIN SODIUM (PORCINE) LOCK FLUSH IV SOLN 100 UNIT/ML 100 UNIT/ML
5 SOLUTION INTRAVENOUS
Status: CANCELLED | OUTPATIENT
Start: 2022-12-22

## 2022-12-22 RX ORDER — CEFTRIAXONE 2 G/1
2 INJECTION, POWDER, FOR SOLUTION INTRAMUSCULAR; INTRAVENOUS ONCE
Status: CANCELLED
Start: 2022-12-22 | End: 2022-12-22

## 2022-12-22 RX ORDER — ALBUTEROL SULFATE 90 UG/1
1-2 AEROSOL, METERED RESPIRATORY (INHALATION)
Status: CANCELLED
Start: 2022-12-22

## 2022-12-22 RX ORDER — DIPHENHYDRAMINE HYDROCHLORIDE 50 MG/ML
50 INJECTION INTRAMUSCULAR; INTRAVENOUS
Status: CANCELLED
Start: 2022-12-22

## 2022-12-22 RX ORDER — ALBUTEROL SULFATE 0.83 MG/ML
2.5 SOLUTION RESPIRATORY (INHALATION)
Status: CANCELLED | OUTPATIENT
Start: 2022-12-22

## 2022-12-22 RX ORDER — CEFTRIAXONE 2 G/1
2 INJECTION, POWDER, FOR SOLUTION INTRAMUSCULAR; INTRAVENOUS ONCE
Status: COMPLETED | OUTPATIENT
Start: 2022-12-22 | End: 2022-12-22

## 2022-12-22 RX ORDER — MEPERIDINE HYDROCHLORIDE 25 MG/ML
25 INJECTION INTRAMUSCULAR; INTRAVENOUS; SUBCUTANEOUS EVERY 30 MIN PRN
Status: CANCELLED | OUTPATIENT
Start: 2022-12-22

## 2022-12-22 RX ORDER — EPINEPHRINE 1 MG/ML
0.3 INJECTION, SOLUTION INTRAMUSCULAR; SUBCUTANEOUS EVERY 5 MIN PRN
Status: CANCELLED | OUTPATIENT
Start: 2022-12-22

## 2022-12-22 RX ORDER — HEPARIN SODIUM (PORCINE) LOCK FLUSH IV SOLN 100 UNIT/ML 100 UNIT/ML
5 SOLUTION INTRAVENOUS
Status: DISCONTINUED | OUTPATIENT
Start: 2022-12-22 | End: 2022-12-22 | Stop reason: HOSPADM

## 2022-12-22 RX ORDER — METHYLPREDNISOLONE SODIUM SUCCINATE 125 MG/2ML
125 INJECTION, POWDER, LYOPHILIZED, FOR SOLUTION INTRAMUSCULAR; INTRAVENOUS
Status: CANCELLED
Start: 2022-12-22

## 2022-12-22 RX ORDER — HEPARIN SODIUM,PORCINE 10 UNIT/ML
5 VIAL (ML) INTRAVENOUS
Status: CANCELLED | OUTPATIENT
Start: 2022-12-22

## 2022-12-22 RX ADMIN — Medication 5 ML: at 15:19

## 2022-12-22 RX ADMIN — CEFTRIAXONE SODIUM 2 G: 2 INJECTION, POWDER, FOR SOLUTION INTRAMUSCULAR; INTRAVENOUS at 14:38

## 2022-12-22 ASSESSMENT — PAIN SCALES - GENERAL: PAINLEVEL: NO PAIN (0)

## 2022-12-23 ENCOUNTER — INFUSION THERAPY VISIT (OUTPATIENT)
Dept: INFUSION THERAPY | Facility: CLINIC | Age: 62
End: 2022-12-23
Attending: RADIOLOGY
Payer: COMMERCIAL

## 2022-12-23 VITALS
SYSTOLIC BLOOD PRESSURE: 128 MMHG | TEMPERATURE: 97.7 F | DIASTOLIC BLOOD PRESSURE: 71 MMHG | HEART RATE: 81 BPM | OXYGEN SATURATION: 96 % | RESPIRATION RATE: 16 BRPM

## 2022-12-23 DIAGNOSIS — L08.9 FINGER INFECTION: Primary | ICD-10-CM

## 2022-12-23 PROCEDURE — 96365 THER/PROPH/DIAG IV INF INIT: CPT

## 2022-12-23 PROCEDURE — 250N000011 HC RX IP 250 OP 636: Performed by: CLINICAL NURSE SPECIALIST

## 2022-12-23 RX ORDER — ALBUTEROL SULFATE 0.83 MG/ML
2.5 SOLUTION RESPIRATORY (INHALATION)
Status: CANCELLED | OUTPATIENT
Start: 2022-12-23

## 2022-12-23 RX ORDER — MEPERIDINE HYDROCHLORIDE 25 MG/ML
25 INJECTION INTRAMUSCULAR; INTRAVENOUS; SUBCUTANEOUS EVERY 30 MIN PRN
Status: CANCELLED | OUTPATIENT
Start: 2022-12-23

## 2022-12-23 RX ORDER — HEPARIN SODIUM,PORCINE 10 UNIT/ML
5 VIAL (ML) INTRAVENOUS
Status: CANCELLED | OUTPATIENT
Start: 2022-12-23

## 2022-12-23 RX ORDER — DIPHENHYDRAMINE HYDROCHLORIDE 50 MG/ML
50 INJECTION INTRAMUSCULAR; INTRAVENOUS
Status: CANCELLED
Start: 2022-12-23

## 2022-12-23 RX ORDER — HEPARIN SODIUM (PORCINE) LOCK FLUSH IV SOLN 100 UNIT/ML 100 UNIT/ML
5 SOLUTION INTRAVENOUS
Status: CANCELLED | OUTPATIENT
Start: 2022-12-23

## 2022-12-23 RX ORDER — CEFTRIAXONE 2 G/1
2 INJECTION, POWDER, FOR SOLUTION INTRAMUSCULAR; INTRAVENOUS ONCE
Status: COMPLETED | OUTPATIENT
Start: 2022-12-23 | End: 2022-12-23

## 2022-12-23 RX ORDER — METHYLPREDNISOLONE SODIUM SUCCINATE 125 MG/2ML
125 INJECTION, POWDER, LYOPHILIZED, FOR SOLUTION INTRAMUSCULAR; INTRAVENOUS
Status: CANCELLED
Start: 2022-12-23

## 2022-12-23 RX ORDER — ALBUTEROL SULFATE 90 UG/1
1-2 AEROSOL, METERED RESPIRATORY (INHALATION)
Status: CANCELLED
Start: 2022-12-23

## 2022-12-23 RX ORDER — EPINEPHRINE 1 MG/ML
0.3 INJECTION, SOLUTION INTRAMUSCULAR; SUBCUTANEOUS EVERY 5 MIN PRN
Status: CANCELLED | OUTPATIENT
Start: 2022-12-23

## 2022-12-23 RX ORDER — CEFTRIAXONE 2 G/1
2 INJECTION, POWDER, FOR SOLUTION INTRAMUSCULAR; INTRAVENOUS ONCE
Status: CANCELLED
Start: 2022-12-23 | End: 2022-12-23

## 2022-12-23 RX ADMIN — CEFTRIAXONE SODIUM 2 G: 2 INJECTION, POWDER, FOR SOLUTION INTRAMUSCULAR; INTRAVENOUS at 13:55

## 2022-12-23 ASSESSMENT — PAIN SCALES - GENERAL: PAINLEVEL: MODERATE PAIN (4)

## 2022-12-23 NOTE — PROGRESS NOTES
Infusion Nursing Note:  Shashi Raza presents today for last dose Rocephin and discontinue midline.    Patient seen by provider today: No   present during visit today: Not Applicable.    Note: midline discontinued post infusion without incident.  Vaseline gauze and occlusive drsg applied. Pressure applied x 5 mins.      Intravenous Access:  Midline discontinued.     Treatment Conditions:  Not Applicable.    Post Infusion Assessment:  Patient tolerated infusion without incident.  Patient observed for 15 minutes post infusion.  Site patent and intact, free from redness, edema or discomfort.  No evidence of extravasations.  Access discontinued per protocol.     Discharge Plan:   Discharge instructions reviewed with: Patient.  Patient discharged in stable condition accompanied by: self.  Departure Mode: Ambulatory.      Deanna Fried RN

## 2022-12-26 NOTE — TELEPHONE ENCOUNTER
DIAGNOSIS:   Surgical site infection   Flexor tendon laceration of finger with open wound, initial encounter      DATE RECEIVED: 01.11.2023    NOTES (Gather within 2 years) STATUS DETAILS   OFFICE NOTE from referring provider   Internal 12.21.2022 Kyara Cleaning MD   OFFICE NOTE from other specialist     DISCHARGE SUMMARY from hospital     DISCHARGE REPORT from the ER     LABS (any labs) Internal    MEDICATION LIST Internal    IMAGING  (NEED IMAGES AND REPORTS)     Osteomyelitis: Foot imaging      Liver Abscess: Abdominal imaging     Other (anything related to diagnoses

## 2022-12-27 ENCOUNTER — TELEPHONE (OUTPATIENT)
Dept: ORTHOPEDICS | Facility: CLINIC | Age: 62
End: 2022-12-27

## 2022-12-27 DIAGNOSIS — M25.642 STIFFNESS OF LEFT HAND JOINT: ICD-10-CM

## 2022-12-27 RX ORDER — METHOCARBAMOL 750 MG/1
750 TABLET, FILM COATED ORAL EVERY 6 HOURS PRN
Qty: 30 TABLET | Refills: 0 | Status: SHIPPED | OUTPATIENT
Start: 2022-12-27 | End: 2023-03-13

## 2022-12-27 NOTE — TELEPHONE ENCOUNTER
[facility] Call Center    Phone Message    May a detailed message be left on voicemail: yes     Reason for Call: Other: Shashi called he is almost out of Methocarbamol he has 1 and 1/2 days left and he needs a refill. Please call into [facility]. Please call and let him know when that has been done.     Action Taken: Other: [facility]    Travel Screening: Not Applicable

## 2022-12-27 NOTE — TELEPHONE ENCOUNTER
Last fill 12-15-22 for #30, will refill x 1 under Dr Cleaning's post operative standing orders. Refill sent. Patient informed. Lauren Anaya RN

## 2022-12-28 ENCOUNTER — IMMUNIZATION (OUTPATIENT)
Dept: FAMILY MEDICINE | Facility: CLINIC | Age: 62
End: 2022-12-28
Payer: COMMERCIAL

## 2022-12-28 PROCEDURE — 0134A COVID-19 VACCINE BIVALENT BOOSTER 18+ (MODERNA): CPT

## 2022-12-28 PROCEDURE — 91313 COVID-19 VACCINE BIVALENT BOOSTER 18+ (MODERNA): CPT

## 2023-01-02 ENCOUNTER — MYC MEDICAL ADVICE (OUTPATIENT)
Dept: ORTHOPEDICS | Facility: CLINIC | Age: 63
End: 2023-01-02

## 2023-01-10 NOTE — PROGRESS NOTES
Date of Injury: 9/14/2022  Mechanism of Injury:  laceration  Diagnosis: left index finger FDP laceration; surgical site infection with osteomyelitis of P3 and abscess    Date of Surgery: 9/23/2022  Surgery: left index finger FDP repair in zone 1    Date of Surgery: 12/14/2022  Surgery: left index finger and distal phalanx I&D    Shashi Raza is a 62 year old male presenting for post-operative evaluation.    Interval 12/21/2022: presents for first postoperative visit after I&D. He is taking IV ceftriaxone until 12/23/2022, after which he will transition to Bactrim until 1/27/2023. He reports no fevers or chills. Only bloody drainage from the wound. Doing dressing changes 1-2x daily.    Interval 1/11/2023: reports his finger feels well, no fevers or chills, no drainage. He is taking his antibiotics as prescribed by ID. No further dressing changes.    Physical Examination:  There were no vitals filed for this visit.  There is no height or weight on file to calculate BMI.    Left index finger  Wound appears non infected without erythema, no drainage  Able to flex and extend index finger tip  Sensation intact at the tip of the left index finger  Intact capillary refill  No tenderness to palpation    Imaging/Studies:   XR left index finger obtained 12/13/2022 with lucency in P3 consistent with osteomyelitis    Assessment: Shashi Raza is a 62 year old male s/p left index finger FDP repair in zone 1 complicated by surgical site infection and P3 osteomyelitis, now s/p I&D    Plan:   I had a discussion with the patient regarding my clinical findings, diagnosis, and treatment plan. We reviewed the post-operative plan, frequency of follow-up, rehabilitation, and expected outcomes.  All questions answered.      WBAT left upper extremity.    Complete Bactrim as prescribed.    Next Visit:    Follow-up: 1 month.    Imaging: None.    Plan: wound check. Instructed patient to send photos of the finger via [employer]It in 2 weeks  or earlier if the finger has any drainage, redness, or other concerns.      BA LO MD
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
## 2023-01-11 ENCOUNTER — OFFICE VISIT (OUTPATIENT)
Dept: INFECTIOUS DISEASES | Facility: CLINIC | Age: 63
End: 2023-01-11
Attending: STUDENT IN AN ORGANIZED HEALTH CARE EDUCATION/TRAINING PROGRAM
Payer: COMMERCIAL

## 2023-01-11 ENCOUNTER — PRE VISIT (OUTPATIENT)
Dept: INFECTIOUS DISEASES | Facility: CLINIC | Age: 63
End: 2023-01-11

## 2023-01-11 ENCOUNTER — OFFICE VISIT (OUTPATIENT)
Dept: ORTHOPEDICS | Facility: CLINIC | Age: 63
End: 2023-01-11
Payer: COMMERCIAL

## 2023-01-11 VITALS
OXYGEN SATURATION: 93 % | TEMPERATURE: 98.3 F | HEART RATE: 91 BPM | DIASTOLIC BLOOD PRESSURE: 79 MMHG | SYSTOLIC BLOOD PRESSURE: 121 MMHG

## 2023-01-11 DIAGNOSIS — S56.129A FLEXOR TENDON LACERATION OF FINGER WITH OPEN WOUND, INITIAL ENCOUNTER: ICD-10-CM

## 2023-01-11 DIAGNOSIS — S61.209A FLEXOR TENDON LACERATION OF FINGER WITH OPEN WOUND, INITIAL ENCOUNTER: ICD-10-CM

## 2023-01-11 DIAGNOSIS — T81.49XA SURGICAL SITE INFECTION: ICD-10-CM

## 2023-01-11 DIAGNOSIS — S61.209A FLEXOR TENDON LACERATION OF FINGER WITH OPEN WOUND, INITIAL ENCOUNTER: Primary | ICD-10-CM

## 2023-01-11 DIAGNOSIS — M86.9: Primary | ICD-10-CM

## 2023-01-11 DIAGNOSIS — M25.642 STIFFNESS OF LEFT HAND JOINT: ICD-10-CM

## 2023-01-11 DIAGNOSIS — S56.129A FLEXOR TENDON LACERATION OF FINGER WITH OPEN WOUND, INITIAL ENCOUNTER: Primary | ICD-10-CM

## 2023-01-11 PROCEDURE — 99024 POSTOP FOLLOW-UP VISIT: CPT | Performed by: STUDENT IN AN ORGANIZED HEALTH CARE EDUCATION/TRAINING PROGRAM

## 2023-01-11 PROCEDURE — G0463 HOSPITAL OUTPT CLINIC VISIT: HCPCS | Performed by: STUDENT IN AN ORGANIZED HEALTH CARE EDUCATION/TRAINING PROGRAM

## 2023-01-11 PROCEDURE — 99204 OFFICE O/P NEW MOD 45 MIN: CPT | Mod: GC | Performed by: STUDENT IN AN ORGANIZED HEALTH CARE EDUCATION/TRAINING PROGRAM

## 2023-01-11 RX ORDER — SULFAMETHOXAZOLE/TRIMETHOPRIM 800-160 MG
1 TABLET ORAL 2 TIMES DAILY
Qty: 12 TABLET | Refills: 0 | Status: SHIPPED | OUTPATIENT
Start: 2023-01-11 | End: 2023-01-30

## 2023-01-11 RX ORDER — METHOCARBAMOL 500 MG/1
500 TABLET, FILM COATED ORAL 4 TIMES DAILY PRN
Qty: 60 TABLET | Refills: 3 | Status: SHIPPED | OUTPATIENT
Start: 2023-01-11 | End: 2023-03-13

## 2023-01-11 NOTE — LETTER
1/11/2023         RE: Shashi Raza  [address]  [facility] [address]        Dear Colleague,    Thank you for referring your patient, Shashi Raza, to the [facility] [place]. Please see a copy of my visit note below.    Date of Injury: 9/14/2022  Mechanism of Injury:  laceration  Diagnosis: left index finger FDP laceration; surgical site infection with osteomyelitis of P3 and abscess    Date of Surgery: 9/23/2022  Surgery: left index finger FDP repair in zone 1    Date of Surgery: 12/14/2022  Surgery: left index finger and distal phalanx I&D    Shashi Raza is a 62 year old male presenting for post-operative evaluation.    Interval 12/21/2022: presents for first postoperative visit after I&D. He is taking IV ceftriaxone until 12/23/2022, after which he will transition to Bactrim until 1/27/2023. He reports no fevers or chills. Only bloody drainage from the wound. Doing dressing changes 1-2x daily.    Interval 1/11/2023: reports his finger feels well, no fevers or chills, no drainage. He is taking his antibiotics as prescribed by ID. No further dressing changes.    Physical Examination:  There were no vitals filed for this visit.  There is no height or weight on file to calculate BMI.    Left index finger  Wound appears non infected without erythema, no drainage  Able to flex and extend index finger tip  Sensation intact at the tip of the left index finger  Intact capillary refill  No tenderness to palpation    Imaging/Studies:   XR left index finger obtained 12/13/2022 with lucency in P3 consistent with osteomyelitis    Assessment: Shashi Raza is a 62 year old male s/p left index finger FDP repair in zone 1 complicated by surgical site infection and P3 osteomyelitis, now s/p I&D    Plan:   I had a discussion with the patient regarding my clinical findings, diagnosis, and treatment plan. We reviewed the post-operative plan, frequency of follow-up, rehabilitation,  and expected outcomes.  All questions answered.      WBAT left upper extremity.    Complete Bactrim as prescribed.    Next Visit:    Follow-up: 1 month.    Imaging: None.    Plan: wound check. Instructed patient to send photos of the finger via MyChart in 2 weeks or earlier if the finger has any drainage, redness, or other concerns.      BA LO MD

## 2023-01-11 NOTE — PATIENT INSTRUCTIONS
We will give you a few more days of antibiotics to get you through to the 27th. After that we do not think that you need further antibiotics. If you have worsening swelling, redness, or notice any pus please call me.

## 2023-01-11 NOTE — NURSING NOTE
Reason For Visit:   Chief Complaint   Patient presents with     Surgical Followup     4 wk POP left index finger I & D DOS: 12/15/22       Primary MD: Yoni Kong  Ref. MD: GUERLINE    Age: 62 year old    ?  No      There were no vitals taken for this visit.      Pain Assessment  Patient Currently in Pain: Yes  0-10 Pain Scale: 4  Primary Pain Location: Finger (Comment which one) (Left indx finger)    Hand Dominance Evaluation  Hand Dominance: Right          QuickDASH Assessment  QuickDASH Main 11/22/2022   1. Open a tight or new jar. No difficulty   2. Do heavy household chores (e.g., wash walls, floors) Moderate difficulty   3. Carry a shopping bag or briefcase. Moderate difficulty   4. Wash your back. Mild difficulty   5. Use a knife to cut food. No difficulty   6. Recreational activities in which you take some force or impact through your arm, shoulder or hand (e.g., golf, hammering, tennis, etc.). Moderate difficulty   7. During the past week, to what extent has your arm, shoulder or hand problem interfered with your normal social activities with family, friends, neighbours or groups? Slightly   8. During the past week, were you limited in your work or other regular daily activities as a result of your arm, shoulder or hand problem? Very limited   9. Arm, shoulder or hand pain. Mild   10.Tingling (pins and needles) in your arm,shoulder or hand. Severe   11. During the past week, how much difficulty have you had sleeping because of the pain in your arm, shoulder or hand? Moderate difficulty   Quickdash Ability Score 38.64          Current Outpatient Medications   Medication Sig Dispense Refill     acetaminophen (TYLENOL) 325 MG tablet Take 2 tablets (650 mg) by mouth every 4 hours as needed for other (Patient not taking: Reported on 12/22/2022) 60 tablet 0     atorvastatin (LIPITOR) 20 MG tablet Take 1 tablet (20 mg) by mouth daily (Patient taking differently: Take 20 mg by mouth At Bedtime) 90  tablet 3     bisacodyl (DULCOLAX) 5 MG EC tablet Take 2 tablets at 3 pm the day before your procedure. If your procedure is before 11 am, take 2 additional tablets at 11 pm. If your procedure is after 11 am, take 2 additional tablets at 6 am. For additional instructions refer to your colonoscopy prep instructions. 4 tablet 0     calcium carbonate (TUMS) 500 MG chewable tablet Take 1-2 tablets (500-1,000 mg) by mouth daily as needed for heartburn (Patient not taking: Reported on 12/19/2022) 45 tablet 0     cefTRIAXone (ROCEPHIN) 2 GM vial Inject 2 g into the vein every 24 hours 140 mL 0     gabapentin (NEURONTIN) 300 MG capsule TAKE ONE CAPSULE BY MOUTH THREE TIMES A DAY (Patient not taking: Reported on 12/19/2022) 90 capsule 1     hydrOXYzine (ATARAX) 25 MG tablet Take 1 tablet (25 mg) by mouth every 6 hours as needed for other (adjuvant pain) (Patient not taking: Reported on 12/19/2022) 40 tablet 0     methocarbamol (ROBAXIN) 750 MG tablet Take 1 tablet (750 mg) by mouth every 6 hours as needed for muscle spasms 30 tablet 0     polyethylene glycol (GOLYTELY) 236 g suspension The night before the exam at 6 pm drink an 8-ounce glass every 15 minutes until the jug is half empty. If you arrive before 11 AM: Drink the other half of the Golytely jug at 11 PM night before procedure. If you arrive after 11 AM: Drink the other half of the Golytely jug at 6 AM day of procedure. For additional instructions refer to your colonoscopy prep instructions. 4000 mL 0     polyethylene glycol (MIRALAX) 17 g packet Take 17 g by mouth daily (Patient not taking: Reported on 12/19/2022) 10 packet 0     predniSONE (DELTASONE) 20 MG tablet TAKE THREE TABLETS BY MOUTH ONCE DAILY FOR 3 DAYS, THEN TAKE TWO TABLETS BY MOUTH ONCE DAILY FOR 3 DAYS, THEN TAKE ONE TABLET BY MOUTH ONCE DAILY FOR 3 DAYS (Patient not taking: Reported on 12/19/2022) 18 tablet 1     senna-docusate (SENOKOT-S/PERICOLACE) 8.6-50 MG tablet Take 1 tablet by mouth 2 times  daily (Patient not taking: Reported on 12/19/2022) 30 tablet 0     sulfamethoxazole-trimethoprim (BACTRIM DS) 800-160 MG tablet Take 1 tablet by mouth 2 times daily for 35 days 70 tablet 0       Allergies   Allergen Reactions     Codeine      Doesn't recall       Quan Mobley, AEMT
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
## 2023-01-12 LAB — BACTERIA ABSC ANAEROBE+AEROBE CULT: NO GROWTH

## 2023-01-16 NOTE — PROGRESS NOTES
Infectious Disease Clinic Staff Note: Mr. Raza was seen, examined, and the case was discussed with Dr. Pepe, ID Fellow -- I agree with her consultative history and examination, assessment and plan in this outpatient ID Consult note. This note reflects my observations and opinions and the plan outlined fully reflects my approach. I have reviewed the available history, radiology, laboratory results, and reports with the Fellow.
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
## 2023-01-24 ENCOUNTER — ANESTHESIA EVENT (OUTPATIENT)
Dept: GASTROENTEROLOGY | Facility: CLINIC | Age: 63
End: 2023-01-24
Payer: COMMERCIAL

## 2023-01-24 ENCOUNTER — HOSPITAL ENCOUNTER (OUTPATIENT)
Facility: CLINIC | Age: 63
Discharge: HOME | End: 2023-01-24
Attending: SPECIALIST | Admitting: SPECIALIST
Payer: COMMERCIAL

## 2023-01-24 ENCOUNTER — ANESTHESIA (OUTPATIENT)
Dept: GASTROENTEROLOGY | Facility: CLINIC | Age: 63
End: 2023-01-24
Payer: COMMERCIAL

## 2023-01-24 VITALS
BODY MASS INDEX: 28.7 KG/M2 | TEMPERATURE: 97.8 F | HEART RATE: 204 BPM | RESPIRATION RATE: 16 BRPM | DIASTOLIC BLOOD PRESSURE: 84 MMHG | OXYGEN SATURATION: 97 % | SYSTOLIC BLOOD PRESSURE: 105 MMHG | WEIGHT: 200 LBS

## 2023-01-24 LAB — COLONOSCOPY: NORMAL

## 2023-01-24 PROCEDURE — 88305 TISSUE EXAM BY PATHOLOGIST: CPT | Mod: TC | Performed by: SPECIALIST

## 2023-01-24 PROCEDURE — 45385 COLONOSCOPY W/LESION REMOVAL: CPT | Mod: PT | Performed by: SPECIALIST

## 2023-01-24 PROCEDURE — 370N000017 HC ANESTHESIA TECHNICAL FEE, PER MIN: Performed by: SPECIALIST

## 2023-01-24 PROCEDURE — 250N000009 HC RX 250: Performed by: NURSE ANESTHETIST, CERTIFIED REGISTERED

## 2023-01-24 PROCEDURE — 250N000009 HC RX 250: Performed by: SPECIALIST

## 2023-01-24 PROCEDURE — 250N000011 HC RX IP 250 OP 636: Performed by: NURSE ANESTHETIST, CERTIFIED REGISTERED

## 2023-01-24 PROCEDURE — 258N000003 HC RX IP 258 OP 636: Performed by: NURSE ANESTHETIST, CERTIFIED REGISTERED

## 2023-01-24 RX ORDER — LIDOCAINE 40 MG/G
CREAM TOPICAL
Status: DISCONTINUED | OUTPATIENT
Start: 2023-01-24 | End: 2023-01-24 | Stop reason: HOSPADM

## 2023-01-24 RX ORDER — PROPOFOL 10 MG/ML
INJECTION, EMULSION INTRAVENOUS CONTINUOUS PRN
Status: DISCONTINUED | OUTPATIENT
Start: 2023-01-24 | End: 2023-01-24

## 2023-01-24 RX ORDER — PROPOFOL 10 MG/ML
INJECTION, EMULSION INTRAVENOUS PRN
Status: DISCONTINUED | OUTPATIENT
Start: 2023-01-24 | End: 2023-01-24

## 2023-01-24 RX ORDER — SODIUM CHLORIDE, SODIUM LACTATE, POTASSIUM CHLORIDE, CALCIUM CHLORIDE 600; 310; 30; 20 MG/100ML; MG/100ML; MG/100ML; MG/100ML
INJECTION, SOLUTION INTRAVENOUS CONTINUOUS
Status: DISCONTINUED | OUTPATIENT
Start: 2023-01-24 | End: 2023-01-24 | Stop reason: HOSPADM

## 2023-01-24 RX ORDER — LIDOCAINE HYDROCHLORIDE 20 MG/ML
INJECTION, SOLUTION INFILTRATION; PERINEURAL PRN
Status: DISCONTINUED | OUTPATIENT
Start: 2023-01-24 | End: 2023-01-24

## 2023-01-24 RX ADMIN — PROPOFOL 50 MG: 10 INJECTION, EMULSION INTRAVENOUS at 12:22

## 2023-01-24 RX ADMIN — SODIUM CHLORIDE, POTASSIUM CHLORIDE, SODIUM LACTATE AND CALCIUM CHLORIDE: 600; 310; 30; 20 INJECTION, SOLUTION INTRAVENOUS at 11:59

## 2023-01-24 RX ADMIN — PROPOFOL 150 MCG/KG/MIN: 10 INJECTION, EMULSION INTRAVENOUS at 12:22

## 2023-01-24 RX ADMIN — LIDOCAINE HYDROCHLORIDE 1 ML: 10 INJECTION, SOLUTION EPIDURAL; INFILTRATION; INTRACAUDAL; PERINEURAL at 11:59

## 2023-01-24 RX ADMIN — LIDOCAINE HYDROCHLORIDE 40 MG: 20 INJECTION, SOLUTION INFILTRATION; PERINEURAL at 12:22

## 2023-01-24 ASSESSMENT — ACTIVITIES OF DAILY LIVING (ADL): ADLS_ACUITY_SCORE: 35

## 2023-01-24 NOTE — ANESTHESIA POSTPROCEDURE EVALUATION
Patient: Shashi Raza    Procedure: Procedure(s):  COLONOSCOPY, FLEXIBLE, WITH LESION REMOVAL USING SNARE       Anesthesia Type:  MAC    Note:  Disposition: Outpatient   Postop Pain Control: Uneventful            Sign Out: Well controlled pain   PONV: No   Neuro/Psych: Uneventful            Sign Out: Acceptable/Baseline neuro status   Airway/Respiratory: Uneventful            Sign Out: Acceptable/Baseline resp. status   CV/Hemodynamics: Uneventful            Sign Out: Acceptable CV status   Other NRE: NONE   DID A NON-ROUTINE EVENT OCCUR? No    Event details/Postop Comments:  Pt was happy with anesthesia care.  No complications.  I will follow up with the pt if needed.           Last vitals:  Vitals Value Taken Time   /68 01/24/23 1315   Temp     Pulse 80 01/24/23 1315   Resp 16 01/24/23 1300   SpO2 97 % 01/24/23 1300   Vitals shown include unvalidated device data.    Electronically Signed By: WEI Rojas CRNA  January 24, 2023  1:26 PM

## 2023-01-24 NOTE — DISCHARGE INSTRUCTIONS
[facility]    Home Care Following Endoscopy          Activity:  You have just undergone an endoscopic procedure usually performed with conscious sedation.  Do not work or operate machinery (including a car) for at least 12 hours.    I encourage you to walk and attempt to pass this air as soon as possible.    Diet:  Return to the diet you were on before your procedure but eat lightly for the first 12-24 hours.  Drink plenty of water.  Resume any regular medications unless otherwise advised by your physician.  Please begin any new medication prescribed as a result of your procedure as directed by your physician.   If you had any biopsy or polyp removed please refrain from aspirin or aspirin products for 2 days.  If on Coumadin please restart as instructed by your physician.   Pain:  You may take Tylenol as needed for pain.  Expected Recovery:  You can expect some mild abdominal fullness and/or discomfort due to the air used to inflate your intestinal tract. It is also normal to have a mild sore throat after upper endoscopy.    Call Your Physician if You Have:  After Colonoscopy:  Worsening persisting abdominal pain which is worse with activity.  Fevers (>101 degrees F), chills or shakes.  Passage of continued blood with bowel movements.   Any questions or concerns about your recovery, please call [phone] or after hours [phone] Nurse Advice Line.    Follow-up Care:  You did have polyps/biopsy tissue sample(s) removed.  The polyps/biopsy tissue sample(s) will be sent to pathology.  You should receive letter in your My Chart with your results within 1-2 weeks. If you do not participate in My Chart a physical letter will come in the mail in 2-3 weeks.  Please call if you have not received a notification of your results.  If asked to return to clinic please make an appointment 1 week after your procedure.  Call [phone].

## 2023-01-24 NOTE — ANESTHESIA CARE TRANSFER NOTE
Patient: Shashi Raza    Procedure: Procedure(s):  COLONOSCOPY, FLEXIBLE, WITH LESION REMOVAL USING SNARE       Diagnosis: Colon cancer screening [Z12.11]  Diagnosis Additional Information: No value filed.    Anesthesia Type:   MAC     Note:    Oropharynx: oropharynx clear of all foreign objects and spontaneously breathing  Level of Consciousness: drowsy  Oxygen Supplementation: room air    Independent Airway: airway patency satisfactory and stable  Dentition: dentition unchanged  Vital Signs Stable: post-procedure vital signs reviewed and stable  Report to RN Given: handoff report given  Patient transferred to: Phase II    Handoff Report: Identifed the Patient, Identified the Reponsible Provider, Reviewed the pertinent medical history, Discussed the surgical course, Reviewed Intra-OP anesthesia mangement and issues during anesthesia, Set expectations for post-procedure period and Allowed opportunity for questions and acknowledgement of understanding      Vitals:  Vitals Value Taken Time   /68 01/24/23 1315   Temp     Pulse 80 01/24/23 1315   Resp 16 01/24/23 1300   SpO2 97 % 01/24/23 1300   Vitals shown include unvalidated device data.    Electronically Signed By: WEI Rojas CRNA  January 24, 2023  1:25 PM
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
## 2023-01-26 LAB
PATH REPORT.COMMENTS IMP SPEC: NORMAL
PATH REPORT.COMMENTS IMP SPEC: NORMAL
PATH REPORT.FINAL DX SPEC: NORMAL
PATH REPORT.GROSS SPEC: NORMAL
PATH REPORT.MICROSCOPIC SPEC OTHER STN: NORMAL
PATH REPORT.RELEVANT HX SPEC: NORMAL
PHOTO IMAGE: NORMAL

## 2023-01-26 PROCEDURE — 88305 TISSUE EXAM BY PATHOLOGIST: CPT | Mod: 26 | Performed by: PATHOLOGY

## 2023-01-30 ENCOUNTER — OFFICE VISIT (OUTPATIENT)
Dept: NEUROSURGERY | Facility: CLINIC | Age: 63
End: 2023-01-30
Attending: FAMILY MEDICINE
Payer: COMMERCIAL

## 2023-01-30 VITALS
SYSTOLIC BLOOD PRESSURE: 128 MMHG | HEIGHT: 71 IN | BODY MASS INDEX: 28 KG/M2 | DIASTOLIC BLOOD PRESSURE: 68 MMHG | WEIGHT: 200 LBS

## 2023-01-30 DIAGNOSIS — M54.2 CERVICALGIA: ICD-10-CM

## 2023-01-30 DIAGNOSIS — G44.219 EPISODIC TENSION-TYPE HEADACHE, NOT INTRACTABLE: ICD-10-CM

## 2023-01-30 PROCEDURE — 99214 OFFICE O/P EST MOD 30 MIN: CPT | Performed by: PHYSICIAN ASSISTANT

## 2023-01-30 ASSESSMENT — PAIN SCALES - GENERAL: PAINLEVEL: MODERATE PAIN (4)

## 2023-01-30 NOTE — PROGRESS NOTES
Neurosurgery Clinic  Neurosurgery followup:    HPI: continues with chronic neck pain, as well as headaches.  Symptoms are exacerbated when he is lying down or holding any objects for more than a few minutes.  He also has difficulty lying flat for any extended period, as his arms and hands get more and more numb.    Exam:  Constitutional:  Alert, well nourished, NAD.  HEENT: Normocephalic, atraumatic.   Pulm:  Without shortness of breath   CV:  No pitting edema of BLE.     Neurological:  Awake  Alert  Oriented x 3  Motor exam:     Shoulder Abduction:  Right:  5/5    Left:  5/5  Biceps:                      Right:  5/5    Left:  5/5  Triceps:                     Right:  5/5    Left:  5/5  Wrist Extensors:       Right:  5/5    Left:  5/5  Wrist Flexors:           Right:  5/5    Left:  5/5  Intrinsics:                  Right:  5/5    Left:  5/5     Able to spontaneously move U/E bilaterally  Sensation intact throughout all U/E dermatomes    Imaging: last cervical MRI in 2016      A/P:   Cervicalgia  Cervical DDD  Headaches-cervicogenic    I did have a discussion with the patient regarding his symptoms.  He has ongoing neck pain, as well as daily headaches.  He also has a lot of muscular pain, in the right paraspinous musculature in the cervical spine.  He gets a lot of numbness in his hands bilaterally, especially after lying down or lifting any objects for a few minutes.  He has a history of a spinal cord injury and surgery back in about 2001.  He has had intermittent numbness in his hand since then, but this does seem to be getting worse with time.  At this point, we will go ahead and obtain a cervical spine MRI for further evaluation.  We also discussed trying some trigger point shots in his cervical paraspinous musculature, but he would like to hold off until the MRI is completed.  He voiced agreement and understanding.        Tray Carvalho PA-C  [facility]  [facility]  [address]  [address] [address]    Tel [phone]  Pager [phone]      The use of Dragon/Mover dictation services may have been used to construct the content in this note; any grammatical or spelling errors are non-intentional. Please contact the author of this note directly if you are in need of any clarification.

## 2023-02-03 ENCOUNTER — HOSPITAL ENCOUNTER (OUTPATIENT)
Dept: MRI IMAGING | Facility: CLINIC | Age: 63
Discharge: HOME | End: 2023-02-03
Attending: PHYSICIAN ASSISTANT | Admitting: PHYSICIAN ASSISTANT
Payer: COMMERCIAL

## 2023-02-03 DIAGNOSIS — M54.2 CERVICALGIA: ICD-10-CM

## 2023-02-03 PROCEDURE — 72141 MRI NECK SPINE W/O DYE: CPT

## 2023-02-14 ENCOUNTER — TELEPHONE (OUTPATIENT)
Dept: NEUROSURGERY | Facility: CLINIC | Age: 63
End: 2023-02-14
Payer: COMMERCIAL

## 2023-02-14 NOTE — TELEPHONE ENCOUNTER
Reason for Call:  Other call back    Detailed comments: pt would like a call back explaining his results from his MRI. Also would like to know if he needs to schedule a follow up. Please call pt back to discuss. Thank you    Phone Number Patient can be reached at: Home number on file [phone] (home)    Best Time: any    Can we leave a detailed message on this number? YES    Call taken on 2/14/2023 at 9:54 AM by Anayeli Mckeon

## 2023-03-12 ASSESSMENT — ENCOUNTER SYMPTOMS
FREQUENCY: 0
WEAKNESS: 0
FEVER: 0
CHILLS: 0
PARESTHESIAS: 0
JOINT SWELLING: 0
CONSTIPATION: 0
HEARTBURN: 0
DIARRHEA: 0
SHORTNESS OF BREATH: 0
SORE THROAT: 0
EYE PAIN: 0
MYALGIAS: 1
HEMATOCHEZIA: 0
COUGH: 0
NAUSEA: 0
HEMATURIA: 0
DIZZINESS: 0
ABDOMINAL PAIN: 0
HEADACHES: 1
NERVOUS/ANXIOUS: 0
PALPITATIONS: 0
ARTHRALGIAS: 1
DYSURIA: 0

## 2023-03-12 ASSESSMENT — ACTIVITIES OF DAILY LIVING (ADL): CURRENT_FUNCTION: NO ASSISTANCE NEEDED

## 2023-03-13 ENCOUNTER — TELEPHONE (OUTPATIENT)
Dept: FAMILY MEDICINE | Facility: CLINIC | Age: 63
End: 2023-03-13

## 2023-03-13 ENCOUNTER — OFFICE VISIT (OUTPATIENT)
Dept: FAMILY MEDICINE | Facility: CLINIC | Age: 63
End: 2023-03-13
Payer: COMMERCIAL

## 2023-03-13 VITALS
HEIGHT: 70 IN | HEART RATE: 77 BPM | OXYGEN SATURATION: 95 % | BODY MASS INDEX: 28.7 KG/M2 | SYSTOLIC BLOOD PRESSURE: 110 MMHG | DIASTOLIC BLOOD PRESSURE: 78 MMHG | TEMPERATURE: 97.6 F

## 2023-03-13 DIAGNOSIS — M54.12 CERVICAL RADICULOPATHY: ICD-10-CM

## 2023-03-13 DIAGNOSIS — Z00.00 ENCOUNTER FOR MEDICARE ANNUAL WELLNESS EXAM: Primary | ICD-10-CM

## 2023-03-13 DIAGNOSIS — G89.29 CHRONIC RIGHT-SIDED LOW BACK PAIN WITH BILATERAL SCIATICA: ICD-10-CM

## 2023-03-13 DIAGNOSIS — M54.41 CHRONIC RIGHT-SIDED LOW BACK PAIN WITH BILATERAL SCIATICA: ICD-10-CM

## 2023-03-13 DIAGNOSIS — M48.02 SPINAL STENOSIS IN CERVICAL REGION: ICD-10-CM

## 2023-03-13 DIAGNOSIS — E78.5 HYPERLIPIDEMIA LDL GOAL <130: Primary | ICD-10-CM

## 2023-03-13 DIAGNOSIS — M54.42 CHRONIC RIGHT-SIDED LOW BACK PAIN WITH BILATERAL SCIATICA: ICD-10-CM

## 2023-03-13 DIAGNOSIS — E78.5 HYPERLIPIDEMIA LDL GOAL <130: ICD-10-CM

## 2023-03-13 DIAGNOSIS — G63 POLYNEUROPATHY IN OTHER DISEASES CLASSIFIED ELSEWHERE (H): ICD-10-CM

## 2023-03-13 PROBLEM — M99.04 SEGMENTAL DYSFUNCTION OF SACRAL REGION: Status: RESOLVED | Noted: 2017-02-01 | Resolved: 2023-03-13

## 2023-03-13 PROBLEM — M99.03 SEGMENTAL DYSFUNCTION OF LUMBAR REGION: Status: RESOLVED | Noted: 2017-02-01 | Resolved: 2023-03-13

## 2023-03-13 PROBLEM — S56.129A FLEXOR TENDON LACERATION OF FINGER WITH OPEN WOUND, INITIAL ENCOUNTER: Status: RESOLVED | Noted: 2022-09-20 | Resolved: 2023-03-13

## 2023-03-13 PROBLEM — L08.9 FINGER INFECTION: Status: RESOLVED | Noted: 2022-12-16 | Resolved: 2023-03-13

## 2023-03-13 PROBLEM — S61.209A FLEXOR TENDON LACERATION OF FINGER WITH OPEN WOUND, INITIAL ENCOUNTER: Status: RESOLVED | Noted: 2022-09-20 | Resolved: 2023-03-13

## 2023-03-13 LAB
CHOLEST SERPL-MCNC: 142 MG/DL
HDLC SERPL-MCNC: 46 MG/DL
LDLC SERPL CALC-MCNC: 70 MG/DL
NONHDLC SERPL-MCNC: 96 MG/DL
TRIGL SERPL-MCNC: 128 MG/DL

## 2023-03-13 PROCEDURE — G0439 PPPS, SUBSEQ VISIT: HCPCS | Performed by: FAMILY MEDICINE

## 2023-03-13 PROCEDURE — 36415 COLL VENOUS BLD VENIPUNCTURE: CPT | Performed by: FAMILY MEDICINE

## 2023-03-13 PROCEDURE — 80061 LIPID PANEL: CPT | Performed by: FAMILY MEDICINE

## 2023-03-13 PROCEDURE — 99214 OFFICE O/P EST MOD 30 MIN: CPT | Mod: 25 | Performed by: FAMILY MEDICINE

## 2023-03-13 RX ORDER — PREDNISONE 20 MG/1
60 TABLET ORAL DAILY
Qty: 15 TABLET | Refills: 2 | Status: SHIPPED | OUTPATIENT
Start: 2023-03-13 | End: 2023-03-18

## 2023-03-13 RX ORDER — GABAPENTIN 300 MG/1
300 CAPSULE ORAL 3 TIMES DAILY
Qty: 90 CAPSULE | Refills: 2 | Status: SHIPPED | OUTPATIENT
Start: 2023-03-13 | End: 2023-06-09

## 2023-03-13 RX ORDER — METHOCARBAMOL 500 MG/1
500 TABLET, FILM COATED ORAL 4 TIMES DAILY PRN
Qty: 60 TABLET | Refills: 3 | Status: SHIPPED | OUTPATIENT
Start: 2023-03-13 | End: 2023-11-29

## 2023-03-13 RX ORDER — ATORVASTATIN CALCIUM 20 MG/1
20 TABLET, FILM COATED ORAL DAILY
Qty: 90 TABLET | Refills: 4 | Status: SHIPPED | OUTPATIENT
Start: 2023-03-13 | End: 2023-06-09

## 2023-03-13 ASSESSMENT — ENCOUNTER SYMPTOMS
NERVOUS/ANXIOUS: 0
DYSURIA: 0
WEAKNESS: 0
HEMATURIA: 0
MYALGIAS: 1
CHILLS: 0
ABDOMINAL PAIN: 0
EYE PAIN: 0
FREQUENCY: 0
NAUSEA: 0
JOINT SWELLING: 0
DIARRHEA: 0
HEADACHES: 1
SHORTNESS OF BREATH: 0
HEARTBURN: 0
HEMATOCHEZIA: 0
SORE THROAT: 0
DIZZINESS: 0
ARTHRALGIAS: 1
COUGH: 0
PARESTHESIAS: 0
PALPITATIONS: 0
CONSTIPATION: 0
FEVER: 0

## 2023-03-13 ASSESSMENT — ACTIVITIES OF DAILY LIVING (ADL): CURRENT_FUNCTION: NO ASSISTANCE NEEDED

## 2023-03-13 NOTE — PATIENT INSTRUCTIONS
Gabapentin:  1 pill daily for 2-3 days  Then 1 pill twice daily for 2-3 days.  Then as directed on bottle.     Patient Education   Personalized Prevention Plan  You are due for the preventive services outlined below.  Your care team is available to assist you in scheduling these services.  If you have already completed any of these items, please share that information with your care team to update in your medical record.  Health Maintenance Due   Topic Date Due    Zoster (Shingles) Vaccine (1 of 2) Never done    Cholesterol Lab  03/08/2023       Exercise for a Healthier Heart  You may wonder how you can improve the health of your heart. If you re thinking about exercise, you re on the right track. You don t need to become an athlete. But you do need a certain amount of brisk exercise to help strengthen your heart. If you have been diagnosed with a heart condition, your healthcare provider may advise exercise to help stabilize your condition. To help make exercise a habit, choose safe, fun activities.      Exercise with a friend. When activity is fun, you're more likely to stick with it.   Before you start  Check with your healthcare provider before starting an exercise program. This is especially important if you have not been active for a while. It's also important if you have a long-term (chronic) health problem such as heart disease, diabetes, or obesity. Or if you are at high risk for having these problems.   Why exercise?  Exercising regularly offers many healthy rewards. It can help you do all of the following:   Improve your blood cholesterol level to help prevent further heart trouble  Lower your blood pressure to help prevent a stroke or heart attack  Control diabetes, or reduce your risk of getting this disease  Improve your heart and lung function  Reach and stay at a healthy weight  Make your muscles stronger so you can stay active  Prevent falls and fractures by slowing the loss of bone mass  (osteoporosis)  Manage stress better  Reduce your blood pressure  Improve your sense of self and your body image  Exercise tips    Ease into your routine. Set small goals. Then build on them. If you are not sure what your activity level should be, talk with your healthcare provider first before starting an exercise routine.  Exercise on most days. Aim for a total of 150 minutes (2 hours and 30 minutes) or more of moderate-intensity aerobic activity each week. Or 75 minutes (1 hour and 15 minutes) or more of vigorous-intensity aerobic activity each week. Or try for a combination of both. Moderate activity means that you breathe heavier and your heart rate increases but you can still talk. Think about doing 40 minutes of moderate exercise, 3 to 4 times a week. For best results, activity should last for about 40 minutes to lower blood pressure and cholesterol. It's OK to work up to the 40-minute period over time. Examples of moderate-intensity activity are walking 1 mile in 15 minutes. Or doing 30 to 45 minutes of yard work.  Step up your daily activity level.  Along with your exercise program, try being more active the whole day. Walk instead of drive. Or park further away so that you take more steps each day. Do more household tasks or yard work. You may not be able to meet the advised mount of physical activity. But doing some moderate- or vigorous-intensity aerobic activity can help reduce your risk for heart disease. Your healthcare provider can help you figure out what is best for you.  Choose 1 or more activities you enjoy.  Walking is one of the easiest things you can do. You can also try swimming, riding a bike, dancing, or taking an exercise class.    When to call your healthcare provider  Call your healthcare provider if you have any of these:   Chest pain or feel dizzy or lightheaded  Burning, tightness, pressure, or heaviness in your chest, neck, shoulders, back, or arms  Abnormal shortness of breath  More  joint or muscle pain  A very fast or irregular heartbeat (palpitations)  [employer] last reviewed this educational content on 7/1/2019 2000-2021 [employer]. All rights reserved. This information is not intended as a substitute for professional medical care. Always follow your healthcare professional's instructions.          Understanding USDA MyPlate  The USDA has guidelines to help you make healthy food choices. These are called MyPlate. MyPlate shows the food groups that make up healthy meals using the image of a place setting. Before you eat, think about the healthiest choices for what to put on your plate or in your cup or bowl. To learn more about building a healthy plate, visit www.choosemyplate.gov.    The food groups  Fruits. Any fruit or 100% fruit juice counts as part of the Fruit Group. Fruits may be fresh, canned, frozen, or dried, and may be whole, cut-up, or pureed. Make 1/2 of your plate fruits and vegetables.  Vegetables. Any vegetable or 100% vegetable juice counts as a member of the Vegetable Group. Vegetables may be fresh, frozen, canned, or dried. They can be served raw or cooked and may be whole, cut-up, or mashed. Make 1/2 of your plate fruits and vegetables.  Grains. All foods made from grains are part of the Grains Group. These include wheat, rice, oats, cornmeal, and barley. Grains are often used to make foods such as bread, pasta, oatmeal, cereal, tortillas, and grits. Grains should be no more than 1/4 of your plate. At least half of your grains should be whole grains.  Protein. This group includes meat, poultry, seafood, beans and peas, eggs, processed soy products (such as tofu), nuts (including nut butters), and seeds. Make protein choices no more than 1/4 of your plate. Meat and poultry choices should be lean or low fat.  Dairy. The Dairy Group includes all fluid milk products and foods made from milk that contain calcium, such as yogurt and cheese. (Foods that have  little calcium, such as cream, butter, and cream cheese, are not part of this group.) Most dairy choices should be low-fat or fat-free.  Oils. Oils aren't a food group, but they do contain essential nutrients. However it's important to watch your intake of oils. These are fats that are liquid at room temperature. They include canola, corn, olive, soybean, vegetable, and sunflower oil. Foods that are mainly oil include mayonnaise, certain salad dressings, and soft margarines. You likely already get your daily oil allowance from the foods you eat.  Things to limit  Eating healthy also means limiting these things in your diet:     Salt (sodium). Many processed foods have a lot of sodium. To keep sodium intake down, eat fresh vegetables, meats, poultry, and seafood when possible. Purchase low-sodium, reduced-sodium, or no-salt-added food products at the store. And don't add salt to your meals at home. Instead, season them with herbs and spices such as dill, oregano, cumin, and paprika. Or try adding flavor with lemon or lime zest and juice.  Saturated fat. Saturated fats are most often found in animal products such as beef, pork, and chicken. They are often solid at room temperature, such as butter. To reduce your saturated fat intake, choose leaner cuts of meat and poultry. And try healthier cooking methods such as grilling, broiling, roasting, or baking. For a simple lower-fat swap, use plain nonfat yogurt instead of mayonnaise when making potato salad or macaroni salad.  Added sugars. These are sugars added to foods. They are in foods such as ice cream, candy, soda, fruit drinks, sports drinks, energy drinks, cookies, pastries, jams, and syrups. Cut down on added sugars by sharing sweet treats with a family member or friend. You can also choose fruit for dessert, and drink water or other unsweetened beverages.     [employer] last reviewed this educational content on 6/1/2020 2000-2021 [employer]. All  rights reserved. This information is not intended as a substitute for professional medical care. Always follow your healthcare professional's instructions.          Signs of Hearing Loss      Hearing much better with one ear can be a sign of hearing loss.   Hearing loss is a problem shared by many people. In fact, it is one of the most common health problems, particularly as people age. Most people age 65 and older have some hearing loss. By age 80, almost everyone does. Hearing loss often occurs slowly over the years. So you may not realize your hearing has gotten worse.  Have your hearing checked  Call your healthcare provider if you:  Have to strain to hear normal conversation  Have to watch other people s faces very carefully to follow what they re saying  Need to ask people to repeat what they ve said  Often misunderstand what people are saying  Turn the volume of the television or radio up so high that others complain  Feel that people are mumbling when they re talking to you  Find that the effort to hear leaves you feeling tired and irritated  Notice, when using the phone, that you hear better with one ear than the other  [employer] last reviewed this educational content on 1/1/2020 2000-2021 [employer]. All rights reserved. This information is not intended as a substitute for professional medical care. Always follow your healthcare professional's instructions.

## 2023-03-13 NOTE — PROGRESS NOTES
"SUBJECTIVE:   Shashi is a 62 year old who presents for Preventive Visit.    Patient has been advised of split billing requirements and indicates understanding: Yes  Are you in the first 12 months of your Medicare coverage?  No    Healthy Habits:     In general, how would you rate your overall health?  Good    Frequency of exercise:  None    Do you usually eat at least 4 servings of fruit and vegetables a day, include whole grains    & fiber and avoid regularly eating high fat or \"junk\" foods?  No    Taking medications regularly:  Yes    Medication side effects:  None    Ability to successfully perform activities of daily living:  No assistance needed    Home Safety:  No safety concerns identified    Hearing Impairment:  Feel that people are mumbling or not speaking clearly, need to ask people to speak up or repeat themselves and difficulty understanding soft or whispered speech    In the past 6 months, have you been bothered by leaking of urine?  No    In general, how would you rate your overall mental or emotional health?  Good      PHQ-2 Total Score: 0    Additional concerns today:  Yes    New patient to me today. History of cervical spinal stenosios, radiculopathy and chronic right sided low back pain with bilateral sciatic and occasional flares.  Takes gabapentin, robaxin and prednisone as needed.  Tapers prendnisone after 3 days or less of use for flares.  Has never tried gabapentin scheduled.      wating on MRI results from neurosurgery as he has had worsening headaches and wsa told by previous primary care provider to see them for further evaluation.    Taking Lipitor (atorvastatin) every other day as cholesterol is very good.  Family medical history of diabetes and heart disease.  He is only family member that has not smoked.    Have you ever done Advance Care Planning? (For example, a Health Directive, POLST, or a discussion with a medical provider or your loved ones about your wishes): No, advance care " planning information given to patient to review.  Patient declined advance care planning discussion at this time.       Fall risk  no falls     Cognitive Screening   1) Repeat 3 items (Leader, Season, Table)    2) Clock draw: NORMAL  3) 3 item recall: Recalls 2 objects   Results: NORMAL clock, 1-2 items recalled: COGNITIVE IMPAIRMENT LESS LIKELY    Mini-CogTM Copyright AMBER Parrish. Licensed by the author for use in [facility]; reprinted with permission (soob@[facility]). All rights reserved.      Do you have sleep apnea, excessive snoring or daytime drowsiness?: no    Reviewed and updated as needed this visit by clinical staff   Tobacco  Allergies  Meds              Reviewed and updated as needed this visit by Provider                 Social History     Tobacco Use     Smoking status: Never     Passive exposure: Never     Smokeless tobacco: Never   Substance Use Topics     Alcohol use: Yes     Alcohol/week: 0.0 standard drinks     Comment: occasional         Alcohol Use 3/12/2023   Prescreen: >3 drinks/day or >7 drinks/week? No             Current providers sharing in care for this patient include:   Patient Care Team:  Ajay Licea MD as PCP - General (Family Medicine)  Yoni Kong MD as Assigned PCP  Kyara Cleaning MD as Assigned Musculoskeletal Provider    The following health maintenance items are reviewed in Epic and correct as of today:  Health Maintenance   Topic Date Due     ZOSTER IMMUNIZATION (1 of 2) Never done     LIPID  03/08/2023     MEDICARE ANNUAL WELLNESS VISIT  03/10/2023     ANNUAL REVIEW OF HM ORDERS  09/21/2023     ADVANCE CARE PLANNING  03/10/2027     COLORECTAL CANCER SCREENING  01/24/2028     DTAP/TDAP/TD IMMUNIZATION (3 - Td or Tdap) 03/28/2032     HEPATITIS C SCREENING  Completed     HIV SCREENING  Completed     PHQ-2 (once per calendar year)  Completed     INFLUENZA VACCINE  Completed     COVID-19 Vaccine  Completed     Pneumococcal Vaccine: Pediatrics (0 to 5  "Years) and At-Risk Patients (6 to 64 Years)  Aged Out     IPV IMMUNIZATION  Aged Out     MENINGITIS IMMUNIZATION  Aged Out               Review of Systems   Constitutional: Negative for chills and fever.   HENT: Positive for ear pain and hearing loss. Negative for congestion and sore throat.    Eyes: Negative for pain and visual disturbance.   Respiratory: Negative for cough and shortness of breath.    Cardiovascular: Negative for chest pain, palpitations and peripheral edema.   Gastrointestinal: Negative for abdominal pain, constipation, diarrhea, heartburn, hematochezia and nausea.   Genitourinary: Negative for dysuria, frequency, genital sores, hematuria, impotence, penile discharge and urgency.   Musculoskeletal: Positive for arthralgias and myalgias. Negative for joint swelling.   Skin: Negative for rash.   Neurological: Positive for headaches. Negative for dizziness, weakness and paresthesias.   Psychiatric/Behavioral: Negative for mood changes. The patient is not nervous/anxious.          OBJECTIVE:   /78   Pulse 77   Temp 97.6  F (36.4  C) (Temporal)   Ht 1.778 m (5' 10\")   SpO2 95%   BMI 28.70 kg/m   Estimated body mass index is 28.7 kg/m  as calculated from the following:    Height as of this encounter: 1.778 m (5' 10\").    Weight as of 1/30/23: 90.7 kg (200 lb).  Physical Exam  Constitutional:       General: He is not in acute distress.     Appearance: He is well-developed.   HENT:      Head: Normocephalic and atraumatic.      Right Ear: Hearing, ear canal and external ear normal. Tympanic membrane is scarred (abnormal landmarks and completely opaque).      Left Ear: Hearing, tympanic membrane, ear canal and external ear normal.      Nose: Nose normal.      Mouth/Throat:      Mouth: No oral lesions.      Pharynx: Uvula midline. No oropharyngeal exudate.   Eyes:      General: Lids are normal. No scleral icterus.        Right eye: No discharge.         Left eye: No discharge.      Extraocular " Movements: Extraocular movements intact.      Conjunctiva/sclera: Conjunctivae normal.      Pupils: Pupils are equal, round, and reactive to light.   Neck:      Thyroid: No thyroid mass or thyromegaly.      Trachea: No tracheal deviation.   Cardiovascular:      Rate and Rhythm: Normal rate and regular rhythm.      Pulses: Normal pulses.      Heart sounds: Normal heart sounds, S1 normal and S2 normal. No murmur heard.    No S3 or S4 sounds.   Pulmonary:      Effort: Pulmonary effort is normal. No respiratory distress.      Breath sounds: Normal breath sounds. No wheezing or rales.   Abdominal:      General: Bowel sounds are normal. There is no distension.      Palpations: Abdomen is soft. There is no mass.      Tenderness: There is no abdominal tenderness. There is no guarding.   Musculoskeletal:         General: No deformity. Normal range of motion.      Cervical back: Normal range of motion and neck supple.   Lymphadenopathy:      Cervical: No cervical adenopathy.      Upper Body:      Right upper body: No supraclavicular adenopathy.      Left upper body: No supraclavicular adenopathy.   Skin:     General: Skin is warm and dry.      Findings: No lesion or rash.   Neurological:      Mental Status: He is alert and oriented to person, place, and time.      Motor: No abnormal muscle tone.      Deep Tendon Reflexes: Reflexes are normal and symmetric.   Psychiatric:         Speech: Speech normal.         Thought Content: Thought content normal.         Judgment: Judgment normal.               ASSESSMENT / PLAN:     ASSESSMENT/ORDERS:    ICD-10-CM    1. Encounter for Medicare annual wellness exam  Z00.00       2. Cervical radiculopathy  M54.12       3. Spinal stenosis in cervical region  M48.02 gabapentin (NEURONTIN) 300 MG capsule     predniSONE (DELTASONE) 20 MG tablet      4. Hyperlipidemia LDL goal <130  E78.5 Lipid panel reflex to direct LDL Fasting     atorvastatin (LIPITOR) 20 MG tablet     Lipid panel reflex to  "direct LDL Fasting      5. Polyneuropathy in other diseases classified elsewhere (H)  G63       6. Chronic right-sided low back pain with bilateral sciatica  M54.41 methocarbamol (ROBAXIN) 500 MG tablet    M54.42     G89.29         PLAN:  1.  Trial of scheduled gabapentin.  Follow-up in 2 months with virtual visit to discuss pain management.  2.   Lipids today.  If doing very well, will trial off medication for 2 months and repeat lipid to see if he needs continued statin use.  3.  Message sent to neurosurgery to help get results of MRI to patient.  4.  Changed predniosne to up to 5 days of treatment with no taper.  2 refills, if needs more than should be better managed to prevent flares.         Patient has been advised of split billing requirements and indicates understanding: Yes      COUNSELING:  Reviewed preventive health counseling, as reflected in patient instructions      BMI:   Estimated body mass index is 28.7 kg/m  as calculated from the following:    Height as of this encounter: 1.778 m (5' 10\").    Weight as of 1/30/23: 90.7 kg (200 lb).         He reports that he has never smoked. He has never been exposed to tobacco smoke. He has never used smokeless tobacco.      Appropriate preventive services were discussed with this patient, including applicable screening as appropriate for cardiovascular disease, diabetes, osteopenia/osteoporosis, and glaucoma.  As appropriate for age/gender, discussed screening for colorectal cancer, prostate cancer, breast cancer, and cervical cancer. Checklist reviewing preventive services available has been given to the patient.    Reviewed patients plan of care and provided an AVS. The Basic Care Plan (routine screening as documented in Health Maintenance) for Shashi meets the Care Plan requirement. This Care Plan has been established and reviewed with the Patient.          Ajay Licea MD  [facility]    Identified Health Risks:  "

## 2023-03-13 NOTE — TELEPHONE ENCOUNTER
This was not discussed at his appointment and I am not sure why he needs it.  Will have staff call patient and get more information behind request.    Ajay Licea MD

## 2023-03-13 NOTE — TELEPHONE ENCOUNTER
General Call      Reason for Call:  Patient had Lab work done. He wants to make sure a complete tox screen is done....    What are your questions or concerns:  See above.    Date of last appointment with provider: Lab 03/13/2023    Could we send this information to you in [place] or would you prefer to receive a phone call?:   No preference   Okay to leave a detailed message?: Yes at Home number on file [phone] (home)

## 2023-03-14 NOTE — RESULT ENCOUNTER NOTE
Shashi,  Your results show really good cholesterol results.  You can trial off the cholesterol medication for 2 months and see how your results compare to now.  The order is in, you just have to schedule a lab appointment in 2 months.  Please let me know if you have any questions.    Sincerely,  Dr. Licea

## 2023-03-17 ENCOUNTER — TELEPHONE (OUTPATIENT)
Dept: NEUROSURGERY | Facility: CLINIC | Age: 63
End: 2023-03-17
Payer: COMMERCIAL

## 2023-03-17 NOTE — TELEPHONE ENCOUNTER
Spoke with patient regarding MRI results. He is currently going to wait and see how his medication adjustment affects him over the next several weeks, and will call if he decides he would like to try an injection. He is also welcome to schedule follow-up with Dr. Panda to discuss potential surgical options.   He continues with intermittent n/t in his arms/hands, as well as right arm pain with some exertion.     Tray Carvalho PA-C

## 2023-05-22 NOTE — LETTER
1/30/2023         RE: Shashi Raza  [address]  [facility] [address]        Dear Colleague,    Thank you for referring your patient, Shashi Raza, to the [facility] [place]. Please see a copy of my visit note below.    Neurosurgery Clinic  Neurosurgery followup:    HPI: continues with chronic neck pain, as well as headaches.  Symptoms are exacerbated when he is lying down or holding any objects for more than a few minutes.  He also has difficulty lying flat for any extended period, as his arms and hands get more and more numb.    Exam:  Constitutional:  Alert, well nourished, NAD.  HEENT: Normocephalic, atraumatic.   Pulm:  Without shortness of breath   CV:  No pitting edema of BLE.     Neurological:  Awake  Alert  Oriented x 3  Motor exam:     Shoulder Abduction:  Right:  5/5    Left:  5/5  Biceps:                      Right:  5/5    Left:  5/5  Triceps:                     Right:  5/5    Left:  5/5  Wrist Extensors:       Right:  5/5    Left:  5/5  Wrist Flexors:           Right:  5/5    Left:  5/5  Intrinsics:                  Right:  5/5    Left:  5/5     Able to spontaneously move U/E bilaterally  Sensation intact throughout all U/E dermatomes    Imaging: last cervical MRI in 2016      A/P:   Cervicalgia  Cervical DDD  Headaches-cervicogenic    I did have a discussion with the patient regarding his symptoms.  He has ongoing neck pain, as well as daily headaches.  He also has a lot of muscular pain, in the right paraspinous musculature in the cervical spine.  He gets a lot of numbness in his hands bilaterally, especially after lying down or lifting any objects for a few minutes.  He has a history of a spinal cord injury and surgery back in about 2001.  He has had intermittent numbness in his hand since then, but this does seem to be getting worse with time.  At this point, we will go ahead and obtain a cervical spine MRI for further evaluation.  We also discussed  trying some trigger point shots in his cervical paraspinous musculature, but he would like to hold off until the MRI is completed.  He voiced agreement and understanding.        Tray Carvalho PA-C  [facility]  [address] [address]    Tel [phone]  Pager [phone]      The use of Dragon/IO.comation services may have been used to construct the content in this note; any grammatical or spelling errors are non-intentional. Please contact the author of this note directly if you are in need of any clarification.        Again, thank you for allowing me to participate in the care of your patient.        Sincerely,        Tray Carvalho PA-C     No

## 2023-06-09 ENCOUNTER — OFFICE VISIT (OUTPATIENT)
Dept: FAMILY MEDICINE | Facility: CLINIC | Age: 63
End: 2023-06-09
Payer: COMMERCIAL

## 2023-06-09 VITALS
HEART RATE: 91 BPM | WEIGHT: 203.38 LBS | DIASTOLIC BLOOD PRESSURE: 70 MMHG | RESPIRATION RATE: 20 BRPM | BODY MASS INDEX: 29.12 KG/M2 | OXYGEN SATURATION: 96 % | HEIGHT: 70 IN | SYSTOLIC BLOOD PRESSURE: 110 MMHG | TEMPERATURE: 98.1 F

## 2023-06-09 DIAGNOSIS — Z23 NEED FOR SHINGLES VACCINE: ICD-10-CM

## 2023-06-09 DIAGNOSIS — M48.02 SPINAL STENOSIS IN CERVICAL REGION: ICD-10-CM

## 2023-06-09 DIAGNOSIS — E78.5 HYPERLIPIDEMIA LDL GOAL <130: ICD-10-CM

## 2023-06-09 DIAGNOSIS — Z82.49 FAMILY HISTORY OF ABDOMINAL AORTIC ANEURYSM (AAA) REPAIR: Primary | ICD-10-CM

## 2023-06-09 PROCEDURE — 90471 IMMUNIZATION ADMIN: CPT | Performed by: FAMILY MEDICINE

## 2023-06-09 PROCEDURE — 90750 HZV VACC RECOMBINANT IM: CPT | Performed by: FAMILY MEDICINE

## 2023-06-09 PROCEDURE — 99214 OFFICE O/P EST MOD 30 MIN: CPT | Mod: 25 | Performed by: FAMILY MEDICINE

## 2023-06-09 RX ORDER — GABAPENTIN 300 MG/1
300 CAPSULE ORAL 3 TIMES DAILY
Qty: 90 CAPSULE | Refills: 2 | Status: CANCELLED | OUTPATIENT
Start: 2023-06-09

## 2023-06-09 RX ORDER — GABAPENTIN 300 MG/1
600 CAPSULE ORAL 3 TIMES DAILY
Qty: 180 CAPSULE | Refills: 2 | Status: SHIPPED | OUTPATIENT
Start: 2023-06-09 | End: 2023-08-15

## 2023-06-09 RX ORDER — PREDNISONE 20 MG/1
60 TABLET ORAL DAILY
Qty: 15 TABLET | Refills: 2
Start: 2023-06-09 | End: 2023-11-29

## 2023-06-09 ASSESSMENT — PAIN SCALES - GENERAL: PAINLEVEL: MODERATE PAIN (4)

## 2023-06-09 NOTE — PROGRESS NOTES
Prior to immunization administration, verified patients identity using patient s name and date of birth. Please see Immunization Activity for additional information.     Screening Questionnaire for Adult Immunization    Are you sick today?   No   Do you have allergies to medications, food, a vaccine component or latex?   No   Have you ever had a serious reaction after receiving a vaccination?   No   Do you have a long-term health problem with heart, lung, kidney, or metabolic disease (e.g., diabetes), asthma, a blood disorder, no spleen, complement component deficiency, a cochlear implant, or a spinal fluid leak?  Are you on long-term aspirin therapy?   No   Do you have cancer, leukemia, HIV/AIDS, or any other immune system problem?   No   Do you have a parent, brother, or sister with an immune system problem?   No   In the past 3 months, have you taken medications that affect  your immune system, such as prednisone, other steroids, or anticancer drugs; drugs for the treatment of rheumatoid arthritis, Crohn s disease, or psoriasis; or have you had radiation treatments?   No   Have you had a seizure, or a brain or other nervous system problem?   No   During the past year, have you received a transfusion of blood or blood    products, or been given immune (gamma) globulin or antiviral drug?   No   For women: Are you pregnant or is there a chance you could become       pregnant during the next month?   No   Have you received any vaccinations in the past 4 weeks?   No     Immunization questionnaire answers were all negative.      Injection of shinrix given by Tessie Persaud MA. Patient instructed to remain in clinic for 15 minutes afterwards, and to report any adverse reactions.     Screening performed by Tessie Persaud MA on 6/9/2023 at 1:51 PM.        Answers for HPI/ROS submitted by the patient on 6/9/2023  Are you regularly taking any medication or supplement to lower your cholesterol?: Yes  Are you  having muscle aches or other side effects that you think could be caused by your cholesterol lowering medication?: No  How many servings of fruits and vegetables do you eat daily?: 0-1  On average, how many sweetened beverages do you drink each day (Examples: soda, juice, sweet tea, etc.  Do NOT count diet or artificially sweetened beverages)?: 0  How many minutes a day do you exercise enough to make your heart beat faster?: 9 or less  How many days a week do you exercise enough to make your heart beat faster?: 3 or less  How many days per week do you miss taking your medication?: 0

## 2023-06-09 NOTE — PROGRESS NOTES
Assessment & Plan     ASSESSMENT/ORDERS:    ICD-10-CM    1. Family history of abdominal aortic aneurysm (AAA) repair  Z82.49 US Abdominal Aorta Imaging      2. Spinal stenosis in cervical region  M48.02 predniSONE (DELTASONE) 20 MG tablet     gabapentin (NEURONTIN) 300 MG capsule      3. Need for shingles vaccine  Z23       4. Hyperlipidemia LDL goal <130  E78.5         PLAN:  1.  Discussed family history in mother of abdominal aortic aneurysm requiring emergency repair with initial onset in late 60's. Patient has no personal history of smoking or hypertension but discussed that he will still need screening due to his family history. Reviewed current guidelines and that screening is recommended at age 65. Order was placed for abdominal ultrasound and patient will complete at the beginning of next year, sometime after he turns 63.   2. Reviewed ongoing spinal stenosis. Will increase gabapentin to 600 mg TID. Also provided refills for prednisone for flares at his last appointment 3 months ago. Discussed with patient that if he is needing prednisone more frequently, he should follow up to discuss other pain management options.   3. Reviewed that patient's last lipid panel was normal. Will trial patient off of atorvastatin and recheck lipids in 2 months.                Follow-up Visit   Expected date:  Aug 09, 2023 (Approximate)      Follow Up Appointment Details:     Follow-up with whom?: Me    Follow-Up for what?: Other (Office Visit)    Additional Details: recheck neck pain and shingrix #2    How?: In Person                  Patient was seen and examined by myself and Ajay Licea MD. The note was then scribed by me.     Parisa Flowers, MS4  [employer]    June 9, 2023    This patient was seen and examined by myself as well as the medical student.  The medical student scribed the note and I have reviewed it, edited it appropriately, and agree with the final documentation.     Ajay  "CATE Licea MD   [facility]    Yeni Danielle is a 62 year old, presenting for the following health issues:  Recheck Medication and Go over family history        6/9/2023    12:44 PM   Additional Questions   Roomed by Sharita KRAFT     History of Present Illness       Hyperlipidemia:  He presents for follow up of hyperlipidemia.  He is taking medication to lower cholesterol. He is not having myalgia or other side effects to statin medications.    He eats 0-1 servings of fruits and vegetables daily.He consumes 0 sweetened beverage(s) daily.He exercises with enough effort to increase his heart rate 9 or less minutes per day.  He exercises with enough effort to increase his heart rate 3 or less days per week.   He is taking medications regularly.       Mother with history of ruptured aortic aneurysm requiring emergency surgery repair. She has had 3-4 aneurysms in her abdomen. First aneurysm occurred when she was age 65-70. Mother with history of smoking and hypertension. Patient was told by surgeon that he should be screening for an aortic aneurysm given his family history. No personal smoking history. No history of hypertension.     Also with ongoing spinal stenosis. Some worsening of numbness and tingling into his hands and would be interested in increasing his gabapentin. He does use prednisone for flares on occasion. Discussed that if need for prednisone is increasing, then he should follow up to discuss alternative therapies. Does get tension headaches that he uses muscle relaxants, ibuprofen and tylenol for. Using Nsaids daily for about a week at a time.     Patient requesting shingles vaccine today. He has checked with his insurance and will not be billed for completing it in a clinic setting.     Review of Systems         Objective    /70   Pulse 91   Temp 98.1  F (36.7  C) (Temporal)   Resp 20   Ht 1.768 m (5' 9.6\")   Wt 92.3 kg (203 lb 6 oz)   SpO2 96%   BMI 29.52 kg/m  "   Body mass index is 29.52 kg/m .  Physical Exam  Constitutional:       Appearance: He is well-developed.   Cardiovascular:      Rate and Rhythm: Normal rate and regular rhythm.      Heart sounds: Normal heart sounds, S1 normal and S2 normal. No murmur heard.  Pulmonary:      Effort: Pulmonary effort is normal. No respiratory distress.      Breath sounds: Normal breath sounds. No wheezing, rhonchi or rales.   Neurological:      Mental Status: He is alert.

## 2023-07-31 ENCOUNTER — TELEPHONE (OUTPATIENT)
Dept: OTOLARYNGOLOGY | Facility: CLINIC | Age: 63
End: 2023-07-31
Payer: COMMERCIAL

## 2023-07-31 NOTE — TELEPHONE ENCOUNTER
Reason for Call:  Same Day Appointment, Requested Provider:  Norm[place]    PCP: Ajay Licea    Reason for visit: right ear infection    Duration of symptoms: 5 days    Have you been treated for this in the past? No    Additional comments: Shashi came to the  requesting to be seen as soon as possible to treat an ear infection. Patient states he does not need audiology. Next available appointment is in December.    Can we leave a detailed message on this number? YES    Phone number patient can be reached at: Home number on file [phone] (home)    Best Time: anytime    Call taken on 7/31/2023 at 2:25 PM by Leighann Box

## 2023-07-31 NOTE — TELEPHONE ENCOUNTER
Called and spoke with patient, he states that over the past 5 days or so he has been having a lot of drainage from his right ear. He states that he recently got new hearing aides and is unsure if this has contributed to his current symptoms. He states that he is unable to wear his hearing aide die to the amount of drainage coming from his ear. Patient states that drops generally do not work for him. Denies fever/chills and pain. Patient was advised that he should be seen by his pcp or at an urgent care. He requested that a message be sent to Dr. Austin to see if he can be worked in as he states Dr. Austin is the only one who has been able to treat his ear infections effectively. Please review and advise.     Radha Hodgson RN   [facility]

## 2023-07-31 NOTE — TELEPHONE ENCOUNTER
If he can somehow be brought into my [place] clinic that is the only space I can potentially see him in   Akash Austin MD

## 2023-08-01 NOTE — TELEPHONE ENCOUNTER
Sent patient a ascentifyt message regarding appointment in [place].   If patient approves location will schedule with next available.  Sofia Crouch RN on 8/1/2023 at 9:09 AM

## 2023-08-09 NOTE — ED AVS SNAPSHOT
Patient comes to clinic for follow up anticoagulation visit.  Last INR on 8/2/23 was 4.7.  Dose decreased.   Today's INR is 3.2 and is above goal range.    Current warfarin total weekly dose of 15 mg verified.  Informed the INR result is above therapeutic range and instructed to increase current dose. Discussed dose and return date of 8/16/23 for next INR. See Anticoagulation flowsheet.    INR today is 3.2, above range and down from previous INR of 4.4 and 4.7 upon lab recheck, in one week on dose of 15mg/last seven days with two held doses. Bruising noted on left lower arm. Denies other s/s bleeding/clotting. 7/17 increased Lisinopril to 40mg daily, no DDI.  8/2 PCP increase Metoprolol to 100 mg BID, no DDI. Elevated potassium noted and pt needs to eat low potassium diet. Pt notes not having usual green vegetables every other day like he usually does for the past week or two, FDI noted. Will resume usual diet. Denies further changes from last INR. Notes continued 3-4 NA beer per week. Denies eating cranberries. Due to large INR decrease and resuming vitamin K foods, advised pt to adjust TWD to 17.5mg/wk and recheck INR in one week. Pt agreeable to plan. AVS given.     Dr. Chavez is in the office today supervising the treatment.  FÉLIX Paniagua referring provider.     Call your physician or seek medical care immediately if you notice any of the following symptoms of a bleed:   · Red, dark, coffee or cola colored urine  · Red or tar like stools  · Excessive bleeding from gums or nose  · Vomiting coffee colored or bright red material  · Coughing up red tinged sputum  · Severe or unprovoked pain (ex: severe Headache or Abdominal pain)  · Sudden, spontaneous bruising for no reason  · For female patients:  Excessive menstrual bleeding  · A cut that will not stop bleeding within 10-15 mins  · Symptoms associated with abnormal bleeding/high INR reviewed.    Encouraged to avoid activities that may result in a serious  [facility] Emergency Department  [address] [place] [address]  Phone:  [phone]  Fax:  [phone]                                    Shashi Raza   MRN: [id number]    Department:  [facility] Emergency Department   Date of Visit:  6/22/2020           After Visit Summary Signature Page    I have received my discharge instructions, and my questions have been answered. I have discussed any challenges I see with this plan with the nurse or doctor.    ..........................................................................................................................................  Patient/Patient Representative Signature      ..........................................................................................................................................  Patient Representative Print Name and Relationship to Patient    ..................................................               ................................................  Date                                   Time    ..........................................................................................................................................  Reviewed by Signature/Title    ...................................................              ..............................................  Date                                               Time          22EPIC Rev 08/18        fall or injury and verbalizes understanding.    Instructed to contact the clinic with any unusual bleeding or bruising, any changes in medications, diet, health status, lifestyle, or any other changes, questions or concerns. Verbalized understanding of all discussed.

## 2023-08-10 ENCOUNTER — OFFICE VISIT (OUTPATIENT)
Dept: OTOLARYNGOLOGY | Facility: CLINIC | Age: 63
End: 2023-08-10
Payer: COMMERCIAL

## 2023-08-10 VITALS
HEART RATE: 65 BPM | SYSTOLIC BLOOD PRESSURE: 123 MMHG | BODY MASS INDEX: 30.19 KG/M2 | DIASTOLIC BLOOD PRESSURE: 69 MMHG | WEIGHT: 208 LBS

## 2023-08-10 DIAGNOSIS — H66.001 ACUTE SUPPURATIVE OTITIS MEDIA OF RIGHT EAR WITHOUT SPONTANEOUS RUPTURE OF TYMPANIC MEMBRANE, RECURRENCE NOT SPECIFIED: ICD-10-CM

## 2023-08-10 DIAGNOSIS — H70.11 CHRONIC MASTOIDITIS OF RIGHT SIDE: Primary | ICD-10-CM

## 2023-08-10 PROCEDURE — 99203 OFFICE O/P NEW LOW 30 MIN: CPT | Mod: 25 | Performed by: OTOLARYNGOLOGY

## 2023-08-10 PROCEDURE — 92504 EAR MICROSCOPY EXAMINATION: CPT | Performed by: OTOLARYNGOLOGY

## 2023-08-10 RX ORDER — CIPROFLOXACIN AND DEXAMETHASONE 3; 1 MG/ML; MG/ML
4 SUSPENSION/ DROPS AURICULAR (OTIC) 2 TIMES DAILY
Qty: 7.5 ML | Refills: 0 | Status: SHIPPED | OUTPATIENT
Start: 2023-08-10 | End: 2023-11-27
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
## 2023-08-15 ENCOUNTER — LAB (OUTPATIENT)
Dept: LAB | Facility: CLINIC | Age: 63
End: 2023-08-15
Payer: COMMERCIAL

## 2023-08-15 ENCOUNTER — OFFICE VISIT (OUTPATIENT)
Dept: FAMILY MEDICINE | Facility: CLINIC | Age: 63
End: 2023-08-15
Attending: FAMILY MEDICINE
Payer: COMMERCIAL

## 2023-08-15 VITALS
TEMPERATURE: 97.2 F | SYSTOLIC BLOOD PRESSURE: 118 MMHG | RESPIRATION RATE: 18 BRPM | DIASTOLIC BLOOD PRESSURE: 70 MMHG | BODY MASS INDEX: 29.78 KG/M2 | OXYGEN SATURATION: 98 % | WEIGHT: 208 LBS | HEART RATE: 68 BPM | HEIGHT: 70 IN

## 2023-08-15 DIAGNOSIS — E78.2 MIXED HYPERLIPIDEMIA: ICD-10-CM

## 2023-08-15 DIAGNOSIS — Z23 NEED FOR SHINGLES VACCINE: ICD-10-CM

## 2023-08-15 DIAGNOSIS — M48.02 SPINAL STENOSIS IN CERVICAL REGION: Primary | ICD-10-CM

## 2023-08-15 DIAGNOSIS — E78.5 HYPERLIPIDEMIA LDL GOAL <130: ICD-10-CM

## 2023-08-15 LAB
CHOLEST SERPL-MCNC: 239 MG/DL
HDLC SERPL-MCNC: 49 MG/DL
LDLC SERPL CALC-MCNC: 149 MG/DL
NONHDLC SERPL-MCNC: 190 MG/DL
TRIGL SERPL-MCNC: 204 MG/DL

## 2023-08-15 PROCEDURE — 90471 IMMUNIZATION ADMIN: CPT | Performed by: FAMILY MEDICINE

## 2023-08-15 PROCEDURE — 99214 OFFICE O/P EST MOD 30 MIN: CPT | Mod: 25 | Performed by: FAMILY MEDICINE

## 2023-08-15 PROCEDURE — 90750 HZV VACC RECOMBINANT IM: CPT | Performed by: FAMILY MEDICINE

## 2023-08-15 PROCEDURE — 36415 COLL VENOUS BLD VENIPUNCTURE: CPT

## 2023-08-15 PROCEDURE — 80061 LIPID PANEL: CPT

## 2023-08-15 RX ORDER — GABAPENTIN 300 MG/1
600 CAPSULE ORAL 3 TIMES DAILY
Qty: 540 CAPSULE | Refills: 2 | Status: SHIPPED | OUTPATIENT
Start: 2023-08-15 | End: 2023-11-29

## 2023-08-15 NOTE — PROGRESS NOTES
Prior to immunization administration, verified patients identity using patient s name and date of birth. Please see Immunization Activity for additional information.     Screening Questionnaire for Adult Immunization    Are you sick today?   No   Do you have allergies to medications, food, a vaccine component or latex?   No   Have you ever had a serious reaction after receiving a vaccination?   No   Do you have a long-term health problem with heart, lung, kidney, or metabolic disease (e.g., diabetes), asthma, a blood disorder, no spleen, complement component deficiency, a cochlear implant, or a spinal fluid leak?  Are you on long-term aspirin therapy?   No   Do you have cancer, leukemia, HIV/AIDS, or any other immune system problem?   No   Do you have a parent, brother, or sister with an immune system problem?   No   In the past 3 months, have you taken medications that affect  your immune system, such as prednisone, other steroids, or anticancer drugs; drugs for the treatment of rheumatoid arthritis, Crohn s disease, or psoriasis; or have you had radiation treatments?   No   Have you had a seizure, or a brain or other nervous system problem?   No   During the past year, have you received a transfusion of blood or blood    products, or been given immune (gamma) globulin or antiviral drug?   No   For women: Are you pregnant or is there a chance you could become       pregnant during the next month?   No   Have you received any vaccinations in the past 4 weeks?   No     Immunization questionnaire answers were all negative.      Patient instructed to remain in clinic for 15 minutes afterwards, and to report any adverse reactions.     Screening performed by Tessie Persaud MA on 8/15/2023 at 11:13 AM.

## 2023-08-15 NOTE — PROGRESS NOTES
Assessment & Plan     ASSESSMENT/ORDERS:    ICD-10-CM    1. Spinal stenosis in cervical region  M48.02 gabapentin (NEURONTIN) 300 MG capsule      2. Need for shingles vaccine  Z23       3. Mixed hyperlipidemia  E78.2         PLAN:  1.  Continue gabapentin at current dose.  Follow-up in 7 months for wellness visit and recheck on medication.   2.  He did ask about right occiput/neck muscle tightness and headaches he will follow-up with me if he wishes to address this in more detail.  3.  Reviewed lipid results.  10 year ASCVD risk using the 2018 Pooled Cohort Equations is about 6.2%  shared mutual decision making that statin is no  longer continued to be recommend for him as risk is not high enough to justify continuation.  He is comfortable with this option.       Ajay Licea MD  [facility]    Yeni Danielle is a 62 year old, presenting for the following health issues:  Recheck Medication        8/15/2023    10:04 AM   Additional Questions   Roomed by Tessie LEE     Medication Followup of  gabapentin  Taking Medication as prescribed: yes  Side Effects:  None  Medication Helping Symptoms:  yes    600 mg three times daily gabapentin dose working better for pain management with neck.  No additional concerns today.    Lab Results   Component Value Date    CHOL 239 08/15/2023    CHOL 170 03/01/2021     Lab Results   Component Value Date    HDL 49 08/15/2023    HDL 58 03/01/2021     Lab Results   Component Value Date     08/15/2023    LDL 76 03/01/2021     Lab Results   Component Value Date    TRIG 204 08/15/2023    TRIG 180 03/01/2021     Lab Results   Component Value Date    CHOLHDLRATIO 3.0 01/09/2015      Has been off medication for about 2 months for lipids.  Wants to know if he should continue medication.  He would prefer not to be on medication if possible.    Review of Systems         Objective    /70   Pulse 68   Temp 97.2  F (36.2  C) (Temporal)  "  Resp 18   Ht 1.768 m (5' 9.6\")   Wt 94.3 kg (208 lb)   SpO2 98%   BMI 30.19 kg/m    Body mass index is 30.19 kg/m .  Physical Exam  Constitutional:       Appearance: He is well-developed.   Cardiovascular:      Rate and Rhythm: Normal rate and regular rhythm.      Heart sounds: Normal heart sounds, S1 normal and S2 normal. No murmur heard.  Pulmonary:      Effort: Pulmonary effort is normal. No respiratory distress.      Breath sounds: Normal breath sounds. No wheezing, rhonchi or rales.   Musculoskeletal:      Comments: Obvious muscle stiffness with neck and inability to fully turn head to left without shoulder rotation.   Neurological:      Mental Status: He is alert.                              "
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
## 2023-09-07 ENCOUNTER — OFFICE VISIT (OUTPATIENT)
Dept: OTOLARYNGOLOGY | Facility: CLINIC | Age: 63
End: 2023-09-07
Payer: COMMERCIAL

## 2023-09-07 VITALS
DIASTOLIC BLOOD PRESSURE: 80 MMHG | BODY MASS INDEX: 30.21 KG/M2 | HEIGHT: 70 IN | WEIGHT: 211 LBS | SYSTOLIC BLOOD PRESSURE: 122 MMHG

## 2023-09-07 DIAGNOSIS — H70.11 CHRONIC MASTOIDITIS, RIGHT: Primary | ICD-10-CM

## 2023-09-07 PROCEDURE — 99213 OFFICE O/P EST LOW 20 MIN: CPT | Performed by: OTOLARYNGOLOGY

## 2023-11-22 DIAGNOSIS — H70.11 CHRONIC MASTOIDITIS OF RIGHT SIDE: ICD-10-CM

## 2023-11-27 RX ORDER — CIPROFLOXACIN AND DEXAMETHASONE 3; 1 MG/ML; MG/ML
4 SUSPENSION/ DROPS AURICULAR (OTIC) 2 TIMES DAILY
Qty: 7.5 ML | Refills: 0 | Status: SHIPPED | OUTPATIENT
Start: 2023-11-27 | End: 2023-12-11

## 2023-11-29 ENCOUNTER — MYC REFILL (OUTPATIENT)
Dept: FAMILY MEDICINE | Facility: CLINIC | Age: 63
End: 2023-11-29
Payer: COMMERCIAL

## 2023-11-29 DIAGNOSIS — G89.29 CHRONIC RIGHT-SIDED LOW BACK PAIN WITH BILATERAL SCIATICA: ICD-10-CM

## 2023-11-29 DIAGNOSIS — M54.41 CHRONIC RIGHT-SIDED LOW BACK PAIN WITH BILATERAL SCIATICA: ICD-10-CM

## 2023-11-29 DIAGNOSIS — M54.42 CHRONIC RIGHT-SIDED LOW BACK PAIN WITH BILATERAL SCIATICA: ICD-10-CM

## 2023-11-29 DIAGNOSIS — M48.02 SPINAL STENOSIS IN CERVICAL REGION: ICD-10-CM

## 2023-12-01 RX ORDER — GABAPENTIN 300 MG/1
600 CAPSULE ORAL 3 TIMES DAILY
Qty: 540 CAPSULE | Refills: 1 | Status: SHIPPED | OUTPATIENT
Start: 2023-12-01 | End: 2024-03-18

## 2023-12-01 RX ORDER — PREDNISONE 20 MG/1
60 TABLET ORAL DAILY
Qty: 15 TABLET | Refills: 1 | Status: SHIPPED | OUTPATIENT
Start: 2023-12-01 | End: 2024-03-18

## 2023-12-01 RX ORDER — METHOCARBAMOL 500 MG/1
500 TABLET, FILM COATED ORAL 4 TIMES DAILY PRN
Qty: 60 TABLET | Refills: 1 | Status: SHIPPED | OUTPATIENT
Start: 2023-12-01 | End: 2024-03-18

## 2023-12-18 ENCOUNTER — OFFICE VISIT (OUTPATIENT)
Dept: OTOLARYNGOLOGY | Facility: CLINIC | Age: 63
End: 2023-12-18
Payer: COMMERCIAL

## 2023-12-18 ENCOUNTER — TELEPHONE (OUTPATIENT)
Dept: ADMISSION | Facility: CLINIC | Age: 63
End: 2023-12-18

## 2023-12-18 VITALS
WEIGHT: 204.6 LBS | HEIGHT: 70 IN | SYSTOLIC BLOOD PRESSURE: 136 MMHG | TEMPERATURE: 98 F | BODY MASS INDEX: 29.29 KG/M2 | DIASTOLIC BLOOD PRESSURE: 80 MMHG

## 2023-12-18 DIAGNOSIS — B36.9 FUNGAL OTITIS EXTERNA: Primary | ICD-10-CM

## 2023-12-18 DIAGNOSIS — H62.40 FUNGAL OTITIS EXTERNA: Primary | ICD-10-CM

## 2023-12-18 DIAGNOSIS — H70.10 CHRONIC INFLAMMATION OF MASTOID CAVITY: ICD-10-CM

## 2023-12-18 DIAGNOSIS — H95.191 ENCOUNTER FOR MASTOIDECTOMY CAVITY DEBRIDEMENT, RIGHT: ICD-10-CM

## 2023-12-18 PROCEDURE — 99214 OFFICE O/P EST MOD 30 MIN: CPT | Mod: 25 | Performed by: OTOLARYNGOLOGY

## 2023-12-18 PROCEDURE — 69220 CLEAN OUT MASTOID CAVITY: CPT | Mod: RT | Performed by: OTOLARYNGOLOGY

## 2023-12-18 RX ORDER — CLOTRIMAZOLE 1 G/ML
SOLUTION TOPICAL 2 TIMES DAILY
Qty: 10 ML | Refills: 0 | Status: SHIPPED | OUTPATIENT
Start: 2023-12-18 | End: 2023-12-27

## 2023-12-18 NOTE — TELEPHONE ENCOUNTER
Reason for Call:  Other call back    Detailed comments: Patient came into clinic he stated that he went to the [facility] about 2 and a half weeks ago for a really bad right ear infection, he was prescribed medicine for 10 days to treat it, but he stated the medicine did not treat it and that his ear is now bleeding internally and he would like to be seen. Patient stated that he is willing to go to any location that  can see him or any other ENT provider.  Please call him [phone]  Phone Number Patient can be reached at: Cell number on file:    Telephone Information:   Mobile [phone]       Best Time: any    Can we leave a detailed message on this number? YES    Call taken on 12/18/2023 at 10:42 AM by Tayla Albarran
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
## 2023-12-20 ENCOUNTER — TELEPHONE (OUTPATIENT)
Dept: FAMILY MEDICINE | Facility: CLINIC | Age: 63
End: 2023-12-20
Payer: COMMERCIAL

## 2023-12-20 DIAGNOSIS — Z12.5 SCREENING FOR PROSTATE CANCER: Primary | ICD-10-CM

## 2023-12-20 NOTE — TELEPHONE ENCOUNTER
Order/Referral Request    Who is requesting: the patient    Orders being requested: a full lab work up, including a PSA, thyroid, liver etc.    Reason service is needed/diagnosis: preventative    When are orders needed by: annual appointment is 3/18/24, would like a lab appointment a couple days prior      Does patient have a preference on a Group/Provider/Facility? [facility]    Does patient have an appointment scheduled?: Yes:     Where to send orders: Place orders within Epic    Could we send this information to you in [facility] or would you prefer to receive a phone call?:   Patient would prefer a phone call   Okay to leave a detailed message?: Yes at Home number on file [phone] (home)

## 2023-12-20 NOTE — TELEPHONE ENCOUNTER
Patient is requesting a full lab work up including PSA, thyroid, liver, ect. Would like to have these labs done a few days prior to his annual that is scheduled in march.

## 2023-12-20 NOTE — TELEPHONE ENCOUNTER
Forms/Letter Request    Type of form/letter:  Life Waiver    Have you been seen for this request: Yes     Do we have the form/letter: Yes:     Who is the form from? Patient    Where did/will the form come from? Patient or family brought in       When is form/letter needed by: NA    How would you like the form/letter returned: Fax : [phone]    Patient Notified form requests are processed in 3-5 business days:Yes    Could we send this information to you in [employer] or would you prefer to receive a phone call?:   No preference   Okay to leave a detailed message?: Yes at Home number on file [phone] (home)

## 2023-12-20 NOTE — TELEPHONE ENCOUNTER
Summary: Life Waiver      Forms/Letter Request     Type of form/letter:  Life Waiver     Have you been seen for this request: Yes      Do we have the form/letter: Yes:      Who is the form from? Patient     Where did/will the form come from? Patient or family brought in        When is form/letter needed by: NA     How would you like the form/letter returned: Fax : [phone]     Patient Notified form requests are processed in 3-5 business days:Yes     Could we send this information to you in [employer] or would you prefer to receive a phone call?:   No preference   Okay to leave a detailed message?: Yes at Home number on file [phone] (home)

## 2023-12-21 NOTE — TELEPHONE ENCOUNTER
Patient dropped off disability forms that need to be completed. Patients last appt with you was 8/15/23. Would you like them to schedule an appt for this to be completed?

## 2023-12-26 NOTE — TELEPHONE ENCOUNTER
I would need to see the forms to decide.  Please notify patient I am out of office until 1/3 and will look at them upon my return.    Ajay Licea MD

## 2023-12-27 ENCOUNTER — OFFICE VISIT (OUTPATIENT)
Dept: OTOLARYNGOLOGY | Facility: OTHER | Age: 63
End: 2023-12-27
Payer: COMMERCIAL

## 2023-12-27 VITALS
BODY MASS INDEX: 29.11 KG/M2 | DIASTOLIC BLOOD PRESSURE: 60 MMHG | WEIGHT: 203.3 LBS | HEIGHT: 70 IN | SYSTOLIC BLOOD PRESSURE: 116 MMHG | TEMPERATURE: 97 F

## 2023-12-27 DIAGNOSIS — H62.40 FUNGAL OTITIS EXTERNA: ICD-10-CM

## 2023-12-27 DIAGNOSIS — B36.9 FUNGAL OTITIS EXTERNA: ICD-10-CM

## 2023-12-27 PROCEDURE — 99213 OFFICE O/P EST LOW 20 MIN: CPT | Performed by: OTOLARYNGOLOGY

## 2023-12-27 RX ORDER — CLOTRIMAZOLE 1 G/ML
SOLUTION TOPICAL 2 TIMES DAILY
Qty: 10 ML | Refills: 0 | Status: SHIPPED | OUTPATIENT
Start: 2023-12-27 | End: 2024-03-18
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
## 2023-12-28 NOTE — TELEPHONE ENCOUNTER
Per medicare rules, all of these labs except for the PSA he is requesting require reasons or medical diagnoses to order and do.  We will need to discuss this when he sees me and then we can order the tests as he has no obvious reason to conduct them.    Will have staff notify patient that PSA is ordered, all other tests must wait for is appointment with me.    Ajay Licea MD

## 2024-01-02 ENCOUNTER — MYC MEDICAL ADVICE (OUTPATIENT)
Dept: FAMILY MEDICINE | Facility: CLINIC | Age: 64
End: 2024-01-02
Payer: COMMERCIAL

## 2024-01-03 NOTE — TELEPHONE ENCOUNTER
Patient returned phone call.  Information in note given to patient.  Patient was not pleased with this information.      Phone went silent, not sure if patient disconnected or if phone call failed.      Disconnected call.    Venecia Powers XRO/

## 2024-01-05 NOTE — TELEPHONE ENCOUNTER
Form has been copied and sent to scanned. Faxed and mailed out as requested by patient. Closing encounter.

## 2024-01-05 NOTE — TELEPHONE ENCOUNTER
Forms completed and placed in basket.  Please copy and send to scanning along with returning original to patient.     Ajay Licea MD

## 2024-01-08 ENCOUNTER — HOSPITAL ENCOUNTER (OUTPATIENT)
Dept: CT IMAGING | Facility: CLINIC | Age: 64
Discharge: HOME | End: 2024-01-08
Attending: OTOLARYNGOLOGY | Admitting: OTOLARYNGOLOGY
Payer: COMMERCIAL

## 2024-01-08 ENCOUNTER — OFFICE VISIT (OUTPATIENT)
Dept: OTOLARYNGOLOGY | Facility: CLINIC | Age: 64
End: 2024-01-08
Payer: COMMERCIAL

## 2024-01-08 ENCOUNTER — TELEPHONE (OUTPATIENT)
Dept: ADMISSION | Facility: CLINIC | Age: 64
End: 2024-01-08

## 2024-01-08 VITALS
BODY MASS INDEX: 28.81 KG/M2 | SYSTOLIC BLOOD PRESSURE: 118 MMHG | HEIGHT: 70 IN | TEMPERATURE: 96.8 F | DIASTOLIC BLOOD PRESSURE: 78 MMHG | WEIGHT: 201.25 LBS

## 2024-01-08 DIAGNOSIS — H70.11 CHRONIC MASTOIDITIS, RIGHT EAR: Primary | ICD-10-CM

## 2024-01-08 DIAGNOSIS — H71.91 CHOLESTEATOMA, RIGHT: ICD-10-CM

## 2024-01-08 DIAGNOSIS — H70.11 CHRONIC MASTOIDITIS, RIGHT EAR: ICD-10-CM

## 2024-01-08 PROCEDURE — 99213 OFFICE O/P EST LOW 20 MIN: CPT | Performed by: OTOLARYNGOLOGY

## 2024-01-08 PROCEDURE — 70480 CT ORBIT/EAR/FOSSA W/O DYE: CPT | Mod: 26 | Performed by: RADIOLOGY

## 2024-01-08 PROCEDURE — 70480 CT ORBIT/EAR/FOSSA W/O DYE: CPT

## 2024-01-08 ASSESSMENT — PAIN SCALES - GENERAL: PAINLEVEL: MODERATE PAIN (5)
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
## 2024-01-10 ENCOUNTER — TELEPHONE (OUTPATIENT)
Dept: OTOLARYNGOLOGY | Facility: CLINIC | Age: 64
End: 2024-01-10
Payer: COMMERCIAL

## 2024-01-10 NOTE — TELEPHONE ENCOUNTER
[facility]    Phone Message    May a detailed message be left on voicemail: yes     Reason for Call: Other: The pt is asking if both appts, 1.11.24 and 1.15.24, are necessary. Can the appointments be combined to the 1.15.24 in-person appointment, and not have the 1.11.24 appt? Please call the pt to discuss as soon as you can. Thanks.     Action Taken: Message routed to:  Other: [facility]    Travel Screening: Not Applicable

## 2024-01-15 ENCOUNTER — OFFICE VISIT (OUTPATIENT)
Dept: OTOLARYNGOLOGY | Facility: CLINIC | Age: 64
End: 2024-01-15
Payer: COMMERCIAL

## 2024-01-15 VITALS
BODY MASS INDEX: 28.83 KG/M2 | TEMPERATURE: 97.4 F | SYSTOLIC BLOOD PRESSURE: 112 MMHG | DIASTOLIC BLOOD PRESSURE: 66 MMHG | HEIGHT: 70 IN | WEIGHT: 201.4 LBS

## 2024-01-15 DIAGNOSIS — H71.91 CHOLESTEATOMA, RIGHT: Primary | ICD-10-CM

## 2024-01-15 PROCEDURE — 99213 OFFICE O/P EST LOW 20 MIN: CPT | Performed by: OTOLARYNGOLOGY
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
## 2024-01-16 ENCOUNTER — TELEPHONE (OUTPATIENT)
Dept: OTOLARYNGOLOGY | Facility: CLINIC | Age: 64
End: 2024-01-16
Payer: COMMERCIAL

## 2024-01-16 NOTE — TELEPHONE ENCOUNTER
I can try and see if I can get patient into [facility] to see Dr. Gray.  I will talk to her on Friday and will get in touch with the patient on Monday.  Akash Austin MD

## 2024-01-16 NOTE — TELEPHONE ENCOUNTER
This encounter is being sent to inform the clinic that this patient has a referral from [facility] for the diagnoses of Cholesteatoma  and has requested that this patient be seen within 1-2 weeks.  Based on the availability of our provider(s), we are unable to accommodate this request.    Were all sites offered this patient?  Offer all available sites    Does scheduling algorithm request to schedule next available?  Patient has been scheduled for the first available opening with Nanda Umanzor on April 9th.  We have informed the patient that the clinic will review their referral and reach out if a sooner appointment is medically necessary.     Sending to [facility] for review per protocols

## 2024-01-16 NOTE — TELEPHONE ENCOUNTER
FUTURE VISIT INFORMATION      FUTURE VISIT INFORMATION:  Date: 4/9/24  Time: 8:40 AM  [place] [facility] [place]  REFERRAL INFORMATION:  Referring provider:  Akash Austin MD  Referring providers clinic:  [facility]  Reason for visit/diagnosis:  Per Pt, dx cholesteatoma referral from nikia Rios in [facility]     RECORDS REQUESTED FROM      Clinic name Comments Records Status Imaging Status   [facility] 8/2023 - 1/15/24 referral/ OV with Akash Austin MD [facility] CT temporal 1/8/24 Epic PACS           history of canal wall down tympanomastoidectomy several decades ago

## 2024-01-16 NOTE — TELEPHONE ENCOUNTER
Telephone encounter was sent by central scheduling to the ENT team about possible sooner work in and they will let patient know if he can be worked in sooner. Please advise what patient should do in the meantime. Laura Shanks CMA

## 2024-01-16 NOTE — TELEPHONE ENCOUNTER
Chart and imaging reviewed with Dr. Umanzor. Pt will need to continue care with Dr. Austin for mastoid cavity care/treating infection. Once infection is under control, can schedule with Basia or Judith for routine cleanings and management.

## 2024-01-19 NOTE — TELEPHONE ENCOUNTER
Called patient to update him. He reports that he was called by [facility] last week but wasn't offered an apt until April. He will call our office back if he doesn't hear anything.    I will monitor this.    Slime Wagner RN on 1/19/2024 at 1:39 PM

## 2024-01-19 NOTE — TELEPHONE ENCOUNTER
Dr. Gray's  [facility] will reach out to the patient next week to get him in  Akash Austin MD

## 2024-01-22 NOTE — TELEPHONE ENCOUNTER
Patient called back today. He has an apt with Dr. Gray 1/25/24. That office wanted a hearing test prior but patient refuses. Patient requests his CT images placed on a disc with report for pick-up.     Writer called radiology who will prepare for patient to  Thursday morning.    Slime Wagner RN on 1/22/2024 at 1:34 PM

## 2024-01-25 ENCOUNTER — TRANSFERRED RECORDS (OUTPATIENT)
Dept: HEALTH INFORMATION MANAGEMENT | Facility: CLINIC | Age: 64
End: 2024-01-25

## 2024-01-25 ENCOUNTER — HOSPITAL ENCOUNTER (OUTPATIENT)
Dept: ULTRASOUND IMAGING | Facility: CLINIC | Age: 64
Discharge: HOME | End: 2024-01-25
Attending: FAMILY MEDICINE | Admitting: FAMILY MEDICINE
Payer: COMMERCIAL

## 2024-01-25 DIAGNOSIS — Z82.49 FAMILY HISTORY OF ABDOMINAL AORTIC ANEURYSM (AAA) REPAIR: ICD-10-CM

## 2024-01-25 PROCEDURE — 76775 US EXAM ABDO BACK WALL LIM: CPT

## 2024-02-08 ENCOUNTER — TRANSFERRED RECORDS (OUTPATIENT)
Dept: HEALTH INFORMATION MANAGEMENT | Facility: CLINIC | Age: 64
End: 2024-02-08
Payer: COMMERCIAL

## 2024-03-18 ENCOUNTER — OFFICE VISIT (OUTPATIENT)
Dept: FAMILY MEDICINE | Facility: CLINIC | Age: 64
End: 2024-03-18
Payer: COMMERCIAL

## 2024-03-18 VITALS
OXYGEN SATURATION: 97 % | WEIGHT: 192 LBS | HEART RATE: 86 BPM | SYSTOLIC BLOOD PRESSURE: 112 MMHG | DIASTOLIC BLOOD PRESSURE: 88 MMHG | RESPIRATION RATE: 12 BRPM | TEMPERATURE: 97.4 F | HEIGHT: 70 IN | BODY MASS INDEX: 27.49 KG/M2

## 2024-03-18 DIAGNOSIS — M54.41 CHRONIC RIGHT-SIDED LOW BACK PAIN WITH BILATERAL SCIATICA: ICD-10-CM

## 2024-03-18 DIAGNOSIS — Z12.5 SCREENING FOR PROSTATE CANCER: ICD-10-CM

## 2024-03-18 DIAGNOSIS — Z80.42 FAMILY HX OF PROSTATE CANCER: ICD-10-CM

## 2024-03-18 DIAGNOSIS — G63 POLYNEUROPATHY IN OTHER DISEASES CLASSIFIED ELSEWHERE (H): ICD-10-CM

## 2024-03-18 DIAGNOSIS — G89.29 CHRONIC RIGHT-SIDED LOW BACK PAIN WITH BILATERAL SCIATICA: ICD-10-CM

## 2024-03-18 DIAGNOSIS — E78.2 MIXED HYPERLIPIDEMIA: ICD-10-CM

## 2024-03-18 DIAGNOSIS — M48.02 SPINAL STENOSIS IN CERVICAL REGION: ICD-10-CM

## 2024-03-18 DIAGNOSIS — Z00.00 ENCOUNTER FOR MEDICARE ANNUAL WELLNESS EXAM: Primary | ICD-10-CM

## 2024-03-18 DIAGNOSIS — M54.42 CHRONIC RIGHT-SIDED LOW BACK PAIN WITH BILATERAL SCIATICA: ICD-10-CM

## 2024-03-18 LAB
ANION GAP SERPL CALCULATED.3IONS-SCNC: 13 MMOL/L (ref 7–15)
BUN SERPL-MCNC: 11.8 MG/DL (ref 8–23)
CALCIUM SERPL-MCNC: 9.9 MG/DL (ref 8.8–10.2)
CHLORIDE SERPL-SCNC: 103 MMOL/L (ref 98–107)
CHOLEST SERPL-MCNC: 239 MG/DL
CREAT SERPL-MCNC: 1.12 MG/DL (ref 0.67–1.17)
DEPRECATED HCO3 PLAS-SCNC: 26 MMOL/L (ref 22–29)
EGFRCR SERPLBLD CKD-EPI 2021: 73 ML/MIN/1.73M2
FASTING STATUS PATIENT QL REPORTED: YES
GLUCOSE SERPL-MCNC: 95 MG/DL (ref 70–99)
HDLC SERPL-MCNC: 54 MG/DL
LDLC SERPL CALC-MCNC: 151 MG/DL
NONHDLC SERPL-MCNC: 185 MG/DL
POTASSIUM SERPL-SCNC: 4 MMOL/L (ref 3.4–5.3)
PSA SERPL DL<=0.01 NG/ML-MCNC: 2.63 NG/ML (ref 0–4.5)
SODIUM SERPL-SCNC: 142 MMOL/L (ref 135–145)
TRIGL SERPL-MCNC: 172 MG/DL

## 2024-03-18 PROCEDURE — 36415 COLL VENOUS BLD VENIPUNCTURE: CPT | Performed by: FAMILY MEDICINE

## 2024-03-18 PROCEDURE — 80048 BASIC METABOLIC PNL TOTAL CA: CPT | Performed by: FAMILY MEDICINE

## 2024-03-18 PROCEDURE — 99214 OFFICE O/P EST MOD 30 MIN: CPT | Mod: 25 | Performed by: FAMILY MEDICINE

## 2024-03-18 PROCEDURE — 80061 LIPID PANEL: CPT | Performed by: FAMILY MEDICINE

## 2024-03-18 PROCEDURE — G0439 PPPS, SUBSEQ VISIT: HCPCS | Performed by: FAMILY MEDICINE

## 2024-03-18 PROCEDURE — G0103 PSA SCREENING: HCPCS | Performed by: FAMILY MEDICINE

## 2024-03-18 RX ORDER — PREDNISONE 20 MG/1
60 TABLET ORAL DAILY
Qty: 15 TABLET | Refills: 1 | Status: SHIPPED | OUTPATIENT
Start: 2024-03-18

## 2024-03-18 RX ORDER — RESPIRATORY SYNCYTIAL VIRUS VACCINE 120MCG/0.5
0.5 KIT INTRAMUSCULAR ONCE
Qty: 1 EACH | Refills: 0 | Status: CANCELLED | OUTPATIENT
Start: 2024-03-18 | End: 2024-03-18

## 2024-03-18 RX ORDER — METHOCARBAMOL 500 MG/1
500 TABLET, FILM COATED ORAL 4 TIMES DAILY PRN
Qty: 60 TABLET | Refills: 1 | Status: SHIPPED | OUTPATIENT
Start: 2024-03-18

## 2024-03-18 RX ORDER — GABAPENTIN 300 MG/1
300 CAPSULE ORAL 3 TIMES DAILY PRN
Qty: 270 CAPSULE | Refills: 1 | Status: SHIPPED | OUTPATIENT
Start: 2024-03-18

## 2024-03-18 SDOH — HEALTH STABILITY: PHYSICAL HEALTH: ON AVERAGE, HOW MANY DAYS PER WEEK DO YOU ENGAGE IN MODERATE TO STRENUOUS EXERCISE (LIKE A BRISK WALK)?: 7 DAYS

## 2024-03-18 ASSESSMENT — PAIN SCALES - GENERAL: PAINLEVEL: MODERATE PAIN (4)

## 2024-03-18 ASSESSMENT — SOCIAL DETERMINANTS OF HEALTH (SDOH): HOW OFTEN DO YOU GET TOGETHER WITH FRIENDS OR RELATIVES?: NEVER

## 2024-03-18 NOTE — PROGRESS NOTES
Preventive Care Visit  [facility]  Ajay Licea MD, Family Medicine  Mar 18, 2024      Assessment & Plan     ASSESSMENT/ORDERS:    ICD-10-CM    1. Encounter for Medicare annual wellness exam  Z00.00       2. Polyneuropathy in other diseases classified elsewhere (H24)  G63 Basic metabolic panel  (Ca, Cl, CO2, Creat, Gluc, K, Na, BUN)     Basic metabolic panel  (Ca, Cl, CO2, Creat, Gluc, K, Na, BUN)      3. Family hx of prostate cancer  Z80.42 PSA, screen     CANCELED: PSA, screen      4. Mixed hyperlipidemia  E78.2 Lipid panel reflex to direct LDL Fasting     Basic metabolic panel  (Ca, Cl, CO2, Creat, Gluc, K, Na, BUN)     Lipid panel reflex to direct LDL Fasting     Basic metabolic panel  (Ca, Cl, CO2, Creat, Gluc, K, Na, BUN)      5. Screening for prostate cancer  Z12.5 PSA, screen     PSA, screen     CANCELED: PSA, screen      6. Spinal stenosis in cervical region  M48.02 predniSONE (DELTASONE) 20 MG tablet     gabapentin (NEURONTIN) 300 MG capsule      7. Chronic right-sided low back pain with bilateral sciatica  M54.41 methocarbamol (ROBAXIN) 500 MG tablet    M54.42     G89.29         PLAN:    Medications refilled for all other above noted stable conditions.  Labs ordered as noted above.               Counseling  Appropriate preventive services were discussed with this patient, including applicable screening as appropriate for fall prevention, nutrition, physical activity, Tobacco-use cessation, weight loss and cognition.  Checklist reviewing preventive services available has been given to the patient.  Reviewed patient's diet, addressing concerns and/or questions.   Patient is at risk for social isolation and has been provided with information about the benefit of social connection.   Patient reported safety concerns were addressed today.The patient was provided with written information regarding signs of hearing loss.           Yeni Danielle is a 63 year old,  presenting for the following:  Wellness Visit        3/18/2024     1:56 PM   Additional Questions   Roomed by Merlin Fernandez [facility]         Health Care Directive  Patient does not have a Health Care Directive or Living Will: Discussed advance care planning with patient; information given to patient to review.    HPI  Patient continues to experience polyneuropathy for which she takes gabapentin 300 mg 3 times daily.  Has occasional muscle spasms for which she takes methocarbamol.  When symptoms are severe, he will take 60 mg x 5 days of prednisone.            3/18/2024   General Health   How would you rate your overall physical health? Good   Feel stress (tense, anxious, or unable to sleep) Not at all         3/18/2024   Nutrition   Diet: I don't know         3/18/2024   Exercise   Days per week of moderate/strenous exercise 7 days         3/18/2024   Social Factors   Frequency of gathering with friends or relatives Never   Worry food won't last until get money to buy more No   Food not last or not have enough money for food? No   Do you have housing?  Yes   Are you worried about losing your housing? No   Lack of transportation? No   Unable to get utilities (heat,electricity)? No   (!) SOCIAL CONNECTIONS CONCERN      3/18/2024   Activities of Daily Living- Home Safety   Needs help with the following daily activites None of the above   Safety concerns in the home No handrails on the stairs         3/18/2024   Dental   Dentist two times every year? Yes         3/18/2024   Hearing Screening   Hearing concerns? (!) I FEEL THAT PEOPLE ARE MUMBLING OR NOT SPEAKING CLEARLY.    (!) I NEED TO ASK PEOPLE TO SPEAK UP OR REPEAT THEMSELVES.    (!) IT'S HARDER TO UNDERSTAND WOMEN'S VOICES THAN MEN'S VOICES.    (!) IT'S HARD TO FOLLOW A CONVERSATION IN A NOISY RESTAURANT OR CROWDED ROOM.    (!) TROUBLE UNDESTANDING A SPEAKER IN A PUBLIC MEETING OR Taoism SERVICE.    (!) TROUBLE FOLLOWING DIALOGUE IN THE THEATHER.    (!) TROUBLE  UNDERSTANDING SOFT OR WHISPERED SPEECH.    (!) TROUBLE UNDERSTANDING SPEECH ON THE TELEPHONE         3/18/2024   Driving Risk Screening   Patient/family members have concerns about driving No         3/18/2024   General Alertness/Fatigue Screening   Have you been more tired than usual lately? No         3/18/2024   Urinary Incontinence Screening   Bothered by leaking urine in past 6 months No         3/18/2024   TB Screening   Were you born outside of the [place]? Yes         Today's PHQ-2 Score:       3/18/2024     1:56 PM   PHQ-2 ( 1999 Pf[employer])   Q1: Little interest or pleasure in doing things 0   Q2: Feeling down, depressed or hopeless 0   PHQ-2 Score 0   Q1: Little interest or pleasure in doing things Not at all   Q2: Feeling down, depressed or hopeless Not at all   PHQ-2 Score 0           3/18/2024   Substance Use   Alcohol more than 3/day or more than 7/wk No   Do you have a current opioid prescription? No   How severe/bad is pain from 1 to 10? 5/10   Do you use any other substances recreationally? No     Social History     Tobacco Use    Smoking status: Never     Passive exposure: Never    Smokeless tobacco: Never   Vaping Use    Vaping Use: Never used   Substance Use Topics    Alcohol use: Yes     Alcohol/week: 0.0 standard drinks of alcohol     Comment: occasional    Drug use: No           3/18/2024   AAA Screening   Family history of Abdominal Aortic Aneurysm (AAA)? Unsure   Last PSA:   PSA   Date Value Ref Range Status   03/01/2021 1.90 0 - 4 ug/L Final     Comment:     Assay Method:  Chemiluminescence using Siemens Vista analyzer     Prostate Specific Antigen Screen   Date Value Ref Range Status   03/08/2022 1.99 0.00 - 4.00 ug/L Final     ASCVD Risk   The 10-year ASCVD risk score (Braxton ROTH, et al., 2019) is: 11.7%    Values used to calculate the score:      Age: 63 years      Sex: Male      Is Non- : No      Diabetic: No      Tobacco smoker: No      Systolic Blood Pressure:  "112 mmHg      Is BP treated: No      HDL Cholesterol: 49 mg/dL      Total Cholesterol: 239 mg/dL            Reviewed and updated as needed this visit by Provider   Tobacco  Allergies  Meds  Problems  Med Hx  Surg Hx  Fam Hx     Sexual Activity            Current providers sharing in care for this patient include:  Patient Care Team:  Ajay Licea MD as PCP - General (Family Medicine)  Kyara Cleaning MD as Assigned Musculoskeletal Provider  Ajay Licea MD as Assigned PCP  Akash Austin MD as Assigned Surgical Provider  Akash Austin MD as MD (Otolaryngology)  Adrianna Cruz AuD as Audiologist (Audiology)  Nanda Umanzor MD as MD (Otolaryngology)    The following health maintenance items are reviewed in Epic and correct as of today:  Health Maintenance   Topic Date Due    RSV VACCINE (Pregnancy & 60+) (1 - 1-dose 60+ series) Never done    PSA  03/08/2023    INFLUENZA VACCINE (1) 09/01/2023    COVID-19 Vaccine (5 - 2023-24 season) 09/01/2023    CREATININE  12/16/2023    GLUCOSE  12/16/2023    Pneumococcal Vaccine: 65+ Years (1 of 1 - PCV) 12/27/2023    MEDICARE ANNUAL WELLNESS VISIT  03/13/2024    LIPID  08/15/2024    ANNUAL REVIEW OF HM ORDERS  03/18/2025    FALL RISK ASSESSMENT  03/18/2025    COLORECTAL CANCER SCREENING  01/24/2028    ADVANCE CARE PLANNING  03/13/2028    DTAP/TDAP/TD IMMUNIZATION (3 - Td or Tdap) 03/28/2032    HEPATITIS C SCREENING  Completed    HIV SCREENING  Completed    PHQ-2 (once per calendar year)  Completed    ZOSTER IMMUNIZATION  Completed    IPV IMMUNIZATION  Aged Out    HPV IMMUNIZATION  Aged Out    MENINGITIS IMMUNIZATION  Aged Out    RSV MONOCLONAL ANTIBODY  Aged Out            Objective    Exam  /88 (BP Location: Left arm, Patient Position: Chair, Cuff Size: Adult Large)   Pulse 86   Temp 97.4  F (36.3  C) (Temporal)   Resp 12   Ht 1.778 m (5' 10\")   Wt 87.1 kg (192 lb)   SpO2 97%   BMI 27.55 kg/m     Estimated body mass " "index is 27.55 kg/m  as calculated from the following:    Height as of this encounter: 1.778 m (5' 10\").    Weight as of this encounter: 87.1 kg (192 lb).    Physical Exam  Constitutional:       General: He is not in acute distress.     Appearance: He is well-developed.   HENT:      Head: Normocephalic and atraumatic.      Right Ear: Hearing, ear canal and external ear normal. Tympanic membrane is scarred (abnormal landmarks and completely opaque).      Left Ear: Hearing, tympanic membrane, ear canal and external ear normal.      Nose: Nose normal.      Mouth/Throat:      Mouth: No oral lesions.      Pharynx: Uvula midline. No oropharyngeal exudate.   Eyes:      General: Lids are normal. No scleral icterus.        Right eye: No discharge.         Left eye: No discharge.      Extraocular Movements: Extraocular movements intact.      Conjunctiva/sclera: Conjunctivae normal.      Pupils: Pupils are equal, round, and reactive to light.   Neck:      Thyroid: No thyroid mass or thyromegaly.      Trachea: No tracheal deviation.   Cardiovascular:      Rate and Rhythm: Normal rate and regular rhythm.      Pulses: Normal pulses.      Heart sounds: Normal heart sounds, S1 normal and S2 normal. No murmur heard.     No S3 or S4 sounds.   Pulmonary:      Effort: Pulmonary effort is normal. No respiratory distress.      Breath sounds: Normal breath sounds. No wheezing or rales.   Abdominal:      General: Bowel sounds are normal. There is no distension.      Palpations: Abdomen is soft. There is no mass.      Tenderness: There is no abdominal tenderness. There is no guarding.   Musculoskeletal:         General: No deformity. Normal range of motion.      Cervical back: Normal range of motion and neck supple.   Lymphadenopathy:      Cervical: No cervical adenopathy.      Upper Body:      Right upper body: No supraclavicular adenopathy.      Left upper body: No supraclavicular adenopathy.   Skin:     General: Skin is warm and dry.     "  Findings: No lesion or rash.   Neurological:      Mental Status: He is alert and oriented to person, place, and time.      Motor: No abnormal muscle tone.      Deep Tendon Reflexes: Reflexes are normal and symmetric.   Psychiatric:         Speech: Speech normal.         Thought Content: Thought content normal.         Judgment: Judgment normal.               3/18/2024   Mini Cog   Clock Draw Score 2 Normal   3 Item Recall 3 objects recalled   Mini Cog Total Score 5              Signed Electronically by: Ajay Licea MD

## 2024-03-18 NOTE — PATIENT INSTRUCTIONS
Case Management Assessment  Initial Evaluation    Date/Time of Evaluation: 7/17/2023 2:18 PM  Assessment Completed by: Hollie Post RN    If patient is discharged prior to next notation, then this note serves as note for discharge by case management. Patient Name: Arely Larson                   YOB: 1962  Diagnosis: Delirium [R41.0]  Acute encephalopathy [G93.40]                   Date / Time: 7/14/2023  5:17 PM    Patient Admission Status: Inpatient   Readmission Risk (Low < 19, Mod (19-27), High > 27): Readmission Risk Score: 14.8    Current PCP: Liseth Lott, DO  PCP verified by CM? (P) Yes    Chart Reviewed: Yes      History Provided by: Patient  Patient Orientation: Place (pt able provided locaion and city,did not know antonio, month, or President)    Patient Cognition: Alert    Hospitalization in the last 30 days (Readmission):  No    If yes, Readmission Assessment in CM Navigator will be completed.     Advance Directives:      Code Status: Full Code   Patient's Primary Decision Maker is:  Miriam marianoece, POA)    Primary Decision Maker: Denise Preston - Niece/Nephew - [phone]    Secondary Decision Maker: Catrina Lee - Brother/Sister - [phone]    Discharge Planning:    Patient lives with: (P) Alone Type of Home: (P) Skilled Nursing Facility  Primary Care Giver: Self (sister sets up medications/and helps w/bill paying, pt does not drive)  Patient Support Systems include: Family Members   Current Financial resources: (P) Medicare  Current community resources: (P)  (none at this time)  Current services prior to admission: (P) None            Current DME:              Type of Home Care services:  (P) None    ADLS  Prior functional level: (P) Independent in ADLs/IADLs  Current functional level: (P) Independent in ADLs/IADLs    PT AM-PAC:   /24  OT AM-PAC:   /24    Family can provide assistance at DC: (P)  (medication s/u and assist w/finances)  Would you like Case Management Preventive Care Advice   This is general advice given by our system to help you stay healthy. However, your care team may have specific advice just for you. Please talk to your care team about your preventive care needs.  Nutrition  Eat 5 or more servings of fruits and vegetables each day.  Try wheat bread, brown rice and whole grain pasta (instead of white bread, rice, and pasta).  Get enough calcium and vitamin D. Check the label on foods and aim for 100% of the RDA (recommended daily allowance).  Lifestyle  Exercise at least 150 minutes each week   (30 minutes a day, 5 days a week).  Do muscle strengthening activities 2 days a week. These help control your weight and prevent disease.  No smoking.  Wear sunscreen to prevent skin cancer.  Have a dental exam and cleaning every 6 months.  Yearly exams  See your health care team every year to talk about:  Any changes in your health.  Any medicines your care team has prescribed.  Preventive care, family planning, and ways to prevent chronic diseases.  Shots (vaccines)   HPV shots (up to age 26), if you've never had them before.  Hepatitis B shots (up to age 59), if you've never had them before.  COVID-19 shot: Get this shot when it's due.  Flu shot: Get a flu shot every year.  Tetanus shot: Get a tetanus shot every 10 years.  Pneumococcal, hepatitis A, and RSV shots: Ask your care team if you need these based on your risk.  Shingles shot (for age 50 and up).  General health tests  Diabetes screening:  Starting at age 35, Get screened for diabetes at least every 3 years.  If you are younger than age 35, ask your care team if you should be screened for diabetes.  Cholesterol test: At age 39, start having a cholesterol test every 5 years, or more often if advised.  Bone density scan (DEXA): At age 50, ask your care team if you should have this scan for osteoporosis (brittle bones).  Hepatitis C: Get tested at least once in your life.  STIs (sexually transmitted  infections)  Before age 24: Ask your care team if you should be screened for STIs.  After age 24: Get screened for STIs if you're at risk. You are at risk for STIs (including HIV) if:  You are sexually active with more than one person.  You don't use condoms every time.  You or a partner was diagnosed with a sexually transmitted infection.  If you are at risk for HIV, ask about PrEP medicine to prevent HIV.  Get tested for HIV at least once in your life, whether you are at risk for HIV or not.  Cancer screening tests  Cervical cancer screening: If you have a cervix, begin getting regular cervical cancer screening tests at age 21. Most people who have regular screenings with normal results can stop after age 65. Talk about this with your provider.  Breast cancer scan (mammogram): If you've ever had breasts, begin having regular mammograms starting at age 40. This is a scan to check for breast cancer.  Colon cancer screening: It is important to start screening for colon cancer at age 45.  Have a colonoscopy test every 10 years (or more often if you're at risk) Or, ask your provider about stool tests like a FIT test every year or Cologuard test every 3 years.  To learn more about your testing options, visit: https://www.[employer]/412629.pdf.  For help making a decision, visit: https://bit.ly/bg51075.  Prostate cancer screening test: If you have a prostate and are age 55 to 69, ask your provider if you would benefit from a yearly prostate cancer screening test.  Lung cancer screening: If you are a current or former smoker age 50 to 80, ask your care team if ongoing lung cancer screenings are right for you.  For informational purposes only. Not to replace the advice of your health care provider. Copyright   2023 [employer]. All rights reserved. Clinically reviewed by the [facility]. [employer] 740957 - REV 01/24.    Learning About Activities of Daily Living  What are activities  of daily living?     Activities of daily living (ADLs) are the basic self-care tasks you do every day. These include eating, bathing, dressing, and moving around.  As you age, and if you have health problems, you may find that it's harder to do some of these tasks. If so, your doctor can suggest ideas that may help.  To measure what kind of help you may need, your doctor will ask how well you are able to do ADLs. Let your doctor know if there are any tasks that you are having trouble doing. This is an important first step to getting help. And when you have the help you need, you can stay as independent as possible.  How will a doctor assess your ADLs?  Asking about ADLs is part of a routine health checkup your doctor will likely do as you age. Your health check might be done in a doctor's office, in your home, or at a hospital. The goal is to find out if you are having any problems that could make it hard to care for yourself or that make it unsafe for you to be on your own.  To measure your ADLs, your doctor will ask how hard it is for you to do routine tasks. Your doctor may also want to know if you have changed the way you do a task because of a health problem. Your doctor may watch how you:  Walk back and forth.  Keep your balance while you stand or walk.  Move from sitting to standing or from a bed to a chair.  Button or unbutton a shirt or sweater.  Remove and put on your shoes.  It's common to feel a little worried or anxious if you find you can't do all the things you used to be able to do. Talking with your doctor about ADLs is a way to make sure you're as safe as possible and able to care for yourself as well as you can. You may want to bring a caregiver, friend, or family member to your checkup. They can help you talk to your doctor.  Follow-up care is a key part of your treatment and safety. Be sure to make and go to all appointments, and call your doctor if you are having problems. It's also a good idea  to know your test results and keep a list of the medicines you take.  Current as of: October 24, 2023               Content Version: 14.0    5055-6132 [employer].   Care instructions adapted under license by your healthcare professional. If you have questions about a medical condition or this instruction, always ask your healthcare professional. [employer] disclaims any warranty or liability for your use of this information.      Hearing Loss: Care Instructions  Overview     Hearing loss is a sudden or slow decrease in how well you hear. It can range from slight to profound. Permanent hearing loss can occur with aging. It also can happen when you are exposed long-term to loud noise. Examples include listening to loud music, riding motorcycles, or being around other loud machines.  Hearing loss can affect your work and home life. It can make you feel lonely or depressed. You may feel that you have lost your independence. But hearing aids and other devices can help you hear better and feel connected to others.  Follow-up care is a key part of your treatment and safety. Be sure to make and go to all appointments, and call your doctor if you are having problems. It's also a good idea to know your test results and keep a list of the medicines you take.  How can you care for yourself at home?  Avoid loud noises whenever possible. This helps keep your hearing from getting worse.  Always wear hearing protection around loud noises.  Wear a hearing aid as directed.  A professional can help you pick a hearing aid that will work best for you.  You can also get hearing aids over the counter for mild to moderate hearing loss.  Have hearing tests as your doctor suggests. They can show whether your hearing has changed. Your hearing aid may need to be adjusted.  Use other devices as needed. These may include:  Telephone amplifiers and hearing aids that can connect to a television, stereo, radio, or  "microphone.  Devices that use lights or vibrations. These alert you to the doorbell, a ringing telephone, or a baby monitor.  Television closed-captioning. This shows the words at the bottom of the screen. Most new TVs can do this.  TTY (text telephone). This lets you type messages back and forth on the telephone instead of talking or listening. These devices are also called TDD. When messages are typed on the keyboard, they are sent over the phone line to a receiving TTY. The message is shown on a monitor.  Use text messaging, social media, and email if it is hard for you to communicate by telephone.  Try to learn a listening technique called speechreading. It is not lipreading. You pay attention to people's gestures, expressions, posture, and tone of voice. These clues can help you understand what a person is saying. Face the person you are talking to, and have them face you. Make sure the lighting is good. You need to see the other person's face clearly.  Think about counseling if you need help to adjust to your hearing loss.  When should you call for help?  Watch closely for changes in your health, and be sure to contact your doctor if:    You think your hearing is getting worse.     You have new symptoms, such as dizziness or nausea.   Where can you learn more?  Go to https://www.[employer].net/patiented  Enter R798 in the search box to learn more about \"Hearing Loss: Care Instructions.\"  Current as of: September 27, 2023               Content Version: 14.0    6926-9523 [employer].   Care instructions adapted under license by your healthcare professional. If you have questions about a medical condition or this instruction, always ask your healthcare professional. [employer] disclaims any warranty or liability for your use of this information.      Relationships for Good Health  Relationships are important for our health and happiness. Social isolation, loneliness and lack of support " are bad for your health. Studies show that loneliness can harm health and limit your life span as much as high blood pressure and smoking.   Take some time to reflect on your relationships. Then answer these questions:  Are there people in your life that cause you stress or drain your energy? What can you do to set limits?  ________________________________________________________________________________________________________________________________________________________________________________________________________________________________________________________________________________________________________________________________________________  Who do you enjoy spending time with? Who can you go to for support?  ________________________________________________________________________________________________________________________________________________________________________________________________________________________________________________________________________________________________________________________________________________  What can you do to improve your relationships with others?  __________________________________________________________________________________________________________________________________________________________________________________________________________________  ______________________________________________________________________________________________________________________________  What do you like most about your relationships with others?  ________________________________________________________________________________________________________________________________________________________________________________________________________________________________________________________________________________________________________________________________________________  My goal:  ______________________________________________________________________  I will ______________________________________________________________________________________________________________________________________________________________________________________________    For informational purposes only. Not to replace the advice of your health care provider. Copyright   2018 [facility]. All rights reserved. Clinically reviewed by [facility]  Team. [employer] 714677 - Rev 04/21.

## 2024-04-09 ENCOUNTER — PRE VISIT (OUTPATIENT)
Dept: OTOLARYNGOLOGY | Facility: CLINIC | Age: 64
End: 2024-04-09

## 2024-05-30 ENCOUNTER — MYC MEDICAL ADVICE (OUTPATIENT)
Dept: FAMILY MEDICINE | Facility: CLINIC | Age: 64
End: 2024-05-30

## 2024-05-31 NOTE — TELEPHONE ENCOUNTER
RN Triage    Patient Contact    Attempt # 1    Was call answered?  No.  Left message on voicemail with information to call me back.    Rukuku message also sent    DANYELLE Martino, RN

## 2024-06-03 ENCOUNTER — NURSE TRIAGE (OUTPATIENT)
Dept: FAMILY MEDICINE | Facility: CLINIC | Age: 64
End: 2024-06-03
Payer: COMMERCIAL

## 2024-06-03 NOTE — TELEPHONE ENCOUNTER
"  Nurse Triage SBAR    Is this a 2nd Level Triage? NO    Situation: Suspicious skin discoloration or moles on left FA for 1 month.  Background: Not on blood thinners, rarely has aleve for chronic back/sciatica pain.    Assessment: Patient states 4 dime sized areas on left FA appeared month ago and are jagged edge. Color red, brown and almost \"bruise like\" but stayed the same color over the month and no injury noted to make them. One scabbed over as hit with door frame   Protocol Recommended Disposition:   See in Office Within 3 Days, See More Appropriate Protocol    Appointments in Next Year      Jun 04, 2024  9:30 AM  (Arrive by 9:10 AM)  Provider Visit with Yenifer Paige PA-C  [facility] ([facility] ) [phone]       Recommendation: Be seen within 3 days. If symptoms worsen or red flags Granados, CP, SOB, bleeding to be seen more emergently for       Does the patient meet one of the following criteria for ADS visit consideration? 16+ years old, with an [facility] PCP     Radha Kruse RN  [facility] - Registered Nurse  Clinic Triage [place]   Yumiko 3, 2024    Reason for Disposition   Small spot, skin growth, or mole   Patient wants to be seen    Additional Information   Negative: Sounds like a life-threatening emergency to the triager   Negative: Shingles suspected (i.e., painful rash, multiple small blisters grouped together in one area of body; dermatomal distribution)   Negative: Looks infected (e.g., spreading redness, red streak, pus)   Negative: Looks like a boil, infected sore, or deep ulcer   Negative: Caller can't describe it clearly   Negative: Fever and bump is tender to touch   Negative: Patient sounds very sick or weak to the triager    Protocols used: Rash or Redness - Wijffwjta-E-EE, Skin Lesion - Moles or Growths-A-OH    "

## 2024-06-04 ENCOUNTER — OFFICE VISIT (OUTPATIENT)
Dept: FAMILY MEDICINE | Facility: CLINIC | Age: 64
End: 2024-06-04
Payer: COMMERCIAL

## 2024-06-04 VITALS
HEART RATE: 74 BPM | HEIGHT: 70 IN | WEIGHT: 200 LBS | RESPIRATION RATE: 16 BRPM | DIASTOLIC BLOOD PRESSURE: 76 MMHG | SYSTOLIC BLOOD PRESSURE: 118 MMHG | TEMPERATURE: 97.4 F | OXYGEN SATURATION: 97 % | BODY MASS INDEX: 28.63 KG/M2

## 2024-06-04 DIAGNOSIS — T14.8XXA HEMATOMA OF SKIN: ICD-10-CM

## 2024-06-04 DIAGNOSIS — I78.1 NEVUS, NON-NEOPLASTIC: Primary | ICD-10-CM

## 2024-06-04 PROCEDURE — 99212 OFFICE O/P EST SF 10 MIN: CPT

## 2024-06-04 RX ORDER — RESPIRATORY SYNCYTIAL VIRUS VACCINE 120MCG/0.5
0.5 KIT INTRAMUSCULAR ONCE
Qty: 1 EACH | Refills: 0 | Status: CANCELLED | OUTPATIENT
Start: 2024-06-04 | End: 2024-06-04

## 2024-06-04 ASSESSMENT — PAIN SCALES - GENERAL: PAINLEVEL: MILD PAIN (2)

## 2024-06-04 NOTE — PROGRESS NOTES
Assessment & Plan     Nevus, non-neoplastic    Hematoma of skin      I spent a total of 15 minutes on the day of the visit.   Time spent by me doing chart review, history and exam, documentation and further activities per the note            See Patient Instructions  Patient Instructions   Wear sunscreen    Mole check in march, sooner if needed    MOLES   A is for asymmetry - healthy moles are symmetrical   B is for border - healthy moles have distinct, sharp borders   C is for color - worrisome moles may have multiple colors in them or be very dark   D is for diameter - be wary of moles greater than 6 mm (larger than a pencil eraser)   E is for evolving - any changing mole should be evaluated      SERMOLLUSCUMINSTRUCTIONS  Molluscum Contagiosum:   It is a viral infection that eventually goes away on its own.    It is transmitted by skin to skin contact   It can occur at any age but is most common between the ages of 3 and 9 years old and then again between the ages of 16 and 24 years old.    Typically it persists for 6-9 months if left untreated. It may persist up to 24 months.    Most are asymptomatic, but symptoms may include tenderness and itching.     What to know:  1. Avoid skin to skin contact - keep areas covered with clothing.  2. Do not pick at the lesions as this can lead to spreading  3. Do not have children take bathes with other siblings as this can also lead to spreading.     There are treatment options including removing the central core of the lesion, freezing them off with liquid nitrogen, or using topical creams and ointments. However, these treatments can lead to scarring and are not recommended on certain areas of the skin due to pain or for cosmetic reasons.     Patient understood and verbally consented to the treatment plan. Discussed symptoms that would warrant an urgent or emergent visit. All of the patients' questions were answered. Patient was instructed to contact the clinic if  "questions or concerns arise. Recommend follow up appointments if symptoms worsen or fail to improve. Recommend follow up as needed. Recommend ER in the case of an emergency.    Yenifer Paige PA-C    Please note: Voice recognition software may have been used in preparing this note, unintended word substitutions may be present.        Yeni Danielle is a 63 year old, presenting for the following health issues:  Derm Problem        6/4/2024     9:15 AM   Additional Questions   Roomed by Catherine KRAFT     History of Present Illness       Reason for visit:  Red spot  Symptom onset:  3-4 weeks ago    He eats 0-1 servings of fruits and vegetables daily.He consumes 3 sweetened beverage(s) daily.He exercises with enough effort to increase his heart rate 30 to 60 minutes per day.  He exercises with enough effort to increase his heart rate 5 days per week.   He is taking medications regularly.   On my left arm.     Size is about half a dime size.  Like big age spots but much much darker.     Brown/Brownish red.       I don t believe it is growing in size.     One was hot and is open.     Wife concerned.  Thank you.      Nurse Triage SBAR     Is this a 2nd Level Triage? NO     Situation: Suspicious skin discoloration or moles on left FA for 1 month.  Background: Not on blood thinners, rarely has aleve for chronic back/sciatica pain.     Assessment: Patient states 4 dime sized areas on left FA appeared month ago and are jagged edge. Color red, brown and almost \"bruise like\" but stayed the same color over the month and no injury noted to make them. One scabbed over as hit with door frame   Protocol Recommended Disposition:   See in Office Within 3 Days, See More Appropriate Protocol     Appointments in Next Year       Jun 04, 2024  9:30 AM  (Arrive by 9:10 AM)  Provider Visit with Yenifer Paige PA-C  [facility] ([facility] ) [phone]         Recommendation: Be seen " "within 3 days. If symptoms worsen or red flags Granados, CP, SOB, bleeding to be seen more emergently for         Does the patient meet one of the following criteria for ADS visit consideration? 16+ years old, with an [facility] PCP      Radha Kruse RN  [facility] - Registered Nurse  Clinic Triage El   Yumiko 3, 2024     Reason for Disposition   Small spot, skin growth, or mole   Patient wants to be seen    Additional Information   Negative: Sounds like a life-threatening emergency to the triager   Negative: Shingles suspected (i.e., painful rash, multiple small blisters grouped together in one area of body; dermatomal distribution)   Negative: Looks infected (e.g., spreading redness, red streak, pus)   Negative: Looks like a boil, infected sore, or deep ulcer   Negative: Caller can't describe it clearly   Negative: Fever and bump is tender to touch   Negative: Patient sounds very sick or weak to the triager    Protocols used: Rash or Redness - Kcotpfzsu-D-HK, Skin Lesion - Moles or Growths-A-OH         Patient has multiple bruises of the forearm and formal back.-Concerned about melanoma.  Patient does not routinely use sunscreen.  Bruising of the arm is been present over the past 2 months and he had a door and one of them opened and started to bleed.  Moles on his back has been there for years.  Moles are not itchy or bothersome.  He has not noticed changes in the moles on his back.                Review of Systems  Constitutional, HEENT, cardiovascular, pulmonary, GI, , musculoskeletal, neuro, skin, endocrine and psych systems are negative, except as otherwise noted.      Objective    /76   Pulse 74   Temp 97.4  F (36.3  C) (Temporal)   Resp 16   Ht 1.778 m (5' 10\")   Wt 90.7 kg (200 lb)   SpO2 97%   BMI 28.70 kg/m    Body mass index is 28.7 kg/m .  Physical Exam   GENERAL: alert and no distress  EYES: Eyes grossly normal to inspection, PERRL and conjunctivae and sclerae normal  MS: no gross " musculoskeletal defects noted, no edema  SKIN: See images below, left arm with multiple hematomas under the skin, 1 with overlying scabbing.  Back has various scattered moles of various sizes and colors.  NEURO: Normal strength and tone, mentation intact and speech normal  PSYCH: mentation appears normal and affect normal/bright                  Office Visit on 03/18/2024   Component Date Value Ref Range Status    Prostate Specific Antigen Screen 03/18/2024 2.63  0.00 - 4.50 ng/mL Final    Cholesterol 03/18/2024 239 (H)  <200 mg/dL Final    Triglycerides 03/18/2024 172 (H)  <150 mg/dL Final    Direct Measure HDL 03/18/2024 54  >=40 mg/dL Final    LDL Cholesterol Calculated 03/18/2024 151 (H)  <=100 mg/dL Final    Non HDL Cholesterol 03/18/2024 185 (H)  <130 mg/dL Final    Patient Fasting > 8hrs? 03/18/2024 Yes   Final    Sodium 03/18/2024 142  135 - 145 mmol/L Final    Reference intervals for this test were updated on 09/26/2023 to more accurately reflect our healthy population. There may be differences in the flagging of prior results with similar values performed with this method. Interpretation of those prior results can be made in the context of the updated reference intervals.     Potassium 03/18/2024 4.0  3.4 - 5.3 mmol/L Final    Chloride 03/18/2024 103  98 - 107 mmol/L Final    Carbon Dioxide (CO2) 03/18/2024 26  22 - 29 mmol/L Final    Anion Gap 03/18/2024 13  7 - 15 mmol/L Final    Urea Nitrogen 03/18/2024 11.8  8.0 - 23.0 mg/dL Final    Creatinine 03/18/2024 1.12  0.67 - 1.17 mg/dL Final    GFR Estimate 03/18/2024 73  >60 mL/min/1.73m2 Final    Calcium 03/18/2024 9.9  8.8 - 10.2 mg/dL Final    Glucose 03/18/2024 95  70 - 99 mg/dL Final     No results found for any visits on 06/04/24.  No results found.        Signed Electronically by: Yenifer Paige PA-C

## 2024-06-04 NOTE — PATIENT INSTRUCTIONS
Wear sunscreen    Monitor closely for mole changes, see below    Mole check in march, sooner if needed    MOLES   A is for asymmetry - healthy moles are symmetrical   B is for border - healthy moles have distinct, sharp borders   C is for color - worrisome moles may have multiple colors in them or be very dark   D is for diameter - be wary of moles greater than 6 mm (larger than a pencil eraser)   E is for evolving - any changing mole should be evaluated      SERMOLLUSCUMINSTRUCTIONS  Molluscum Contagiosum:   It is a viral infection that eventually goes away on its own.    It is transmitted by skin to skin contact   It can occur at any age but is most common between the ages of 3 and 9 years old and then again between the ages of 16 and 24 years old.    Typically it persists for 6-9 months if left untreated. It may persist up to 24 months.    Most are asymptomatic, but symptoms may include tenderness and itching.     What to know:  1. Avoid skin to skin contact - keep areas covered with clothing.  2. Do not pick at the lesions as this can lead to spreading  3. Do not have children take bathes with other siblings as this can also lead to spreading.     There are treatment options including removing the central core of the lesion, freezing them off with liquid nitrogen, or using topical creams and ointments. However, these treatments can lead to scarring and are not recommended on certain areas of the skin due to pain or for cosmetic reasons.     Patient understood and verbally consented to the treatment plan. Discussed symptoms that would warrant an urgent or emergent visit. All of the patients' questions were answered. Patient was instructed to contact the clinic if questions or concerns arise. Recommend follow up appointments if symptoms worsen or fail to improve. Recommend follow up as needed. Recommend ER in the case of an emergency.    Yenifer Paige PA-C    Please note: Voice recognition software may have  been used in preparing this note, unintended word substitutions may be present.

## 2024-09-17 ENCOUNTER — IMMUNIZATION (OUTPATIENT)
Dept: FAMILY MEDICINE | Facility: CLINIC | Age: 64
End: 2024-09-17
Payer: COMMERCIAL

## 2024-09-17 PROCEDURE — 90662 IIV NO PRSV INCREASED AG IM: CPT

## 2024-09-17 PROCEDURE — G0008 ADMIN INFLUENZA VIRUS VAC: HCPCS

## 2024-11-29 NOTE — PATIENT INSTRUCTIONS
Preventive Health Recommendations  Male Ages 50   64    Yearly exam:             See your health care provider every year in order to  o   Review health changes.   o   Discuss preventive care.    o   Review your medicines if your doctor has prescribed any.     Have a cholesterol test every 5 years, or more frequently if you are at risk for high cholesterol/heart disease.     Have a diabetes test (fasting glucose) every three years. If you are at risk for diabetes, you should have this test more often.     Have a colonoscopy at age 50, or have a yearly FIT test (stool test). These exams will check for colon cancer.      Talk with your health care provider about whether or not a prostate cancer screening test (PSA) is right for you.    You should be tested each year for STDs (sexually transmitted diseases), if you re at risk.     Shots: Get a flu shot each year. Get a tetanus shot every 10 years.     Nutrition:    Eat at least 5 servings of fruits and vegetables daily.     Eat whole-grain bread, whole-wheat pasta and brown rice instead of white grains and rice.     Talk to your provider about Calcium and Vitamin D.     Lifestyle    Exercise for at least 150 minutes a week (30 minutes a day, 5 days a week). This will help you control your weight and prevent disease.     Limit alcohol to one drink per day.     No smoking.     Wear sunscreen to prevent skin cancer.     See your dentist every six months for an exam and cleaning.     See your eye doctor every 1 to 2 years.      
ADMIT
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
## 2025-03-07 ENCOUNTER — TELEPHONE (OUTPATIENT)
Dept: FAMILY MEDICINE | Facility: CLINIC | Age: 65
End: 2025-03-07
Payer: COMMERCIAL

## 2025-03-07 NOTE — TELEPHONE ENCOUNTER
PO order has been signed and patient can make a lab appointment prior to his visit to have all of his routine labs all completed.    Patient to be notified by staff.    Ajay Licea MD

## 2025-03-07 NOTE — TELEPHONE ENCOUNTER
Order/Referral Request    Who is requesting: Shashi    Orders being requested: complete blood work , psa, kidneys full work up    Reason service is needed/diagnosis: prior to annual wellness    When are orders needed by: 4/1/2025    Has this been discussed with Provider: No    Does patient have a preference on a Group/Provider/Facility?  Please call when ready.     Does patient have an appointment scheduled?: Yes:     Where to send orders: Place orders within Epic    Could we send this information to you in [facility] or would you prefer to receive a phone call?:   Patient would prefer a phone call   Okay to leave a detailed message?: Yes at Home number on file [phone] (home)

## 2025-03-10 NOTE — TELEPHONE ENCOUNTER
I spoke to the patient and he is out of town and will schedule the lab appt in a few days.    Laurel

## 2025-03-25 ENCOUNTER — TELEPHONE (OUTPATIENT)
Dept: FAMILY MEDICINE | Facility: CLINIC | Age: 65
End: 2025-03-25

## 2025-03-25 NOTE — TELEPHONE ENCOUNTER
Patient Quality Outreach    Patient is due for the following:   Depression  -  phq2  Physical Annual Wellness Visit    Action(s) Taken:   Patient has upcoming appointment, these items will be addressed at that time.    Type of outreach:    Chart review performed, no outreach needed.    Questions for provider review:    None         Esther Roman  Chart routed to Care Team.

## 2025-03-27 ENCOUNTER — LAB (OUTPATIENT)
Dept: LAB | Facility: CLINIC | Age: 65
End: 2025-03-27
Payer: COMMERCIAL

## 2025-03-27 ENCOUNTER — TELEPHONE (OUTPATIENT)
Dept: FAMILY MEDICINE | Facility: CLINIC | Age: 65
End: 2025-03-27

## 2025-03-27 DIAGNOSIS — E78.2 MIXED HYPERLIPIDEMIA: ICD-10-CM

## 2025-03-27 DIAGNOSIS — Z13.1 SCREENING FOR DIABETES MELLITUS: ICD-10-CM

## 2025-03-27 DIAGNOSIS — Z12.5 SCREENING FOR PROSTATE CANCER: ICD-10-CM

## 2025-03-27 DIAGNOSIS — G63 POLYNEUROPATHY IN OTHER DISEASES CLASSIFIED ELSEWHERE: ICD-10-CM

## 2025-03-27 DIAGNOSIS — G63 POLYNEUROPATHY IN OTHER DISEASES CLASSIFIED ELSEWHERE: Primary | ICD-10-CM

## 2025-03-27 DIAGNOSIS — Z13.6 SCREENING FOR CARDIOVASCULAR CONDITION: Primary | ICD-10-CM

## 2025-03-27 LAB
ALBUMIN SERPL BCG-MCNC: 4.3 G/DL (ref 3.5–5.2)
ALP SERPL-CCNC: 63 U/L (ref 40–150)
ALT SERPL W P-5'-P-CCNC: 23 U/L (ref 0–70)
ANION GAP SERPL CALCULATED.3IONS-SCNC: 11 MMOL/L (ref 7–15)
AST SERPL W P-5'-P-CCNC: 31 U/L (ref 0–45)
BILIRUB SERPL-MCNC: 0.6 MG/DL
BUN SERPL-MCNC: 15.1 MG/DL (ref 8–23)
CALCIUM SERPL-MCNC: 9.7 MG/DL (ref 8.8–10.4)
CHLORIDE SERPL-SCNC: 105 MMOL/L (ref 98–107)
CHOLEST SERPL-MCNC: 227 MG/DL
CREAT SERPL-MCNC: 1.02 MG/DL (ref 0.67–1.17)
CREAT SERPL-MCNC: 1.02 MG/DL (ref 0.67–1.17)
EGFRCR SERPLBLD CKD-EPI 2021: 81 ML/MIN/1.73M2
EGFRCR SERPLBLD CKD-EPI 2021: 81 ML/MIN/1.73M2
FASTING STATUS PATIENT QL REPORTED: YES
GLUCOSE SERPL-MCNC: 111 MG/DL (ref 70–99)
GLUCOSE SERPL-MCNC: 111 MG/DL (ref 70–99)
HCO3 SERPL-SCNC: 25 MMOL/L (ref 22–29)
HDLC SERPL-MCNC: 55 MG/DL
HOLD SPECIMEN: NORMAL
LDLC SERPL CALC-MCNC: 137 MG/DL
NONHDLC SERPL-MCNC: 172 MG/DL
POTASSIUM SERPL-SCNC: 4.2 MMOL/L (ref 3.4–5.3)
PROT SERPL-MCNC: 6.8 G/DL (ref 6.4–8.3)
PSA SERPL DL<=0.01 NG/ML-MCNC: 2.73 NG/ML (ref 0–4.5)
SODIUM SERPL-SCNC: 141 MMOL/L (ref 135–145)
TRIGL SERPL-MCNC: 177 MG/DL

## 2025-03-27 NOTE — TELEPHONE ENCOUNTER
----- Message from Irene ELLIS sent at 3/27/2025  7:59 AM CDT -----  Pt is requesting more chemistry test be added on to his blood work done this morning. CMP    Thank you Irene

## 2025-04-01 ENCOUNTER — OFFICE VISIT (OUTPATIENT)
Dept: FAMILY MEDICINE | Facility: CLINIC | Age: 65
End: 2025-04-01
Attending: FAMILY MEDICINE
Payer: COMMERCIAL

## 2025-04-01 VITALS
HEART RATE: 71 BPM | TEMPERATURE: 98.2 F | HEIGHT: 70 IN | DIASTOLIC BLOOD PRESSURE: 70 MMHG | WEIGHT: 190.6 LBS | BODY MASS INDEX: 27.29 KG/M2 | OXYGEN SATURATION: 99 % | SYSTOLIC BLOOD PRESSURE: 122 MMHG | RESPIRATION RATE: 16 BRPM

## 2025-04-01 DIAGNOSIS — M54.42 CHRONIC RIGHT-SIDED LOW BACK PAIN WITH BILATERAL SCIATICA: ICD-10-CM

## 2025-04-01 DIAGNOSIS — M54.41 CHRONIC RIGHT-SIDED LOW BACK PAIN WITH BILATERAL SCIATICA: ICD-10-CM

## 2025-04-01 DIAGNOSIS — G63 POLYNEUROPATHY IN OTHER DISEASES CLASSIFIED ELSEWHERE: ICD-10-CM

## 2025-04-01 DIAGNOSIS — E78.2 MIXED HYPERLIPIDEMIA: ICD-10-CM

## 2025-04-01 DIAGNOSIS — Z23 NEED FOR MMR VACCINE: ICD-10-CM
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
## 2025-04-01 DIAGNOSIS — Z97.4 WEARS HEARING AID IN BOTH EARS: ICD-10-CM

## 2025-04-01 DIAGNOSIS — M48.02 SPINAL STENOSIS IN CERVICAL REGION: ICD-10-CM

## 2025-04-01 DIAGNOSIS — Z00.00 ENCOUNTER FOR MEDICARE ANNUAL WELLNESS EXAM: Primary | ICD-10-CM

## 2025-04-01 DIAGNOSIS — G89.29 CHRONIC RIGHT-SIDED LOW BACK PAIN WITH BILATERAL SCIATICA: ICD-10-CM

## 2025-04-01 PROBLEM — R10.31 RLQ ABDOMINAL PAIN: Status: RESOLVED | Noted: 2022-09-15 | Resolved: 2025-04-01

## 2025-04-01 PROBLEM — M54.12 CERVICAL RADICULOPATHY: Status: RESOLVED | Noted: 2022-09-15 | Resolved: 2025-04-01

## 2025-04-01 PROBLEM — M54.50 BILATERAL LOW BACK PAIN WITHOUT SCIATICA, UNSPECIFIED CHRONICITY: Status: RESOLVED | Noted: 2017-01-26 | Resolved: 2025-04-01

## 2025-04-01 PROCEDURE — G2211 COMPLEX E/M VISIT ADD ON: HCPCS | Performed by: FAMILY MEDICINE

## 2025-04-01 PROCEDURE — G0009 ADMIN PNEUMOCOCCAL VACCINE: HCPCS | Performed by: FAMILY MEDICINE

## 2025-04-01 PROCEDURE — 99213 OFFICE O/P EST LOW 20 MIN: CPT | Mod: 25 | Performed by: FAMILY MEDICINE

## 2025-04-01 PROCEDURE — 1126F AMNT PAIN NOTED NONE PRSNT: CPT | Performed by: FAMILY MEDICINE

## 2025-04-01 PROCEDURE — G0439 PPPS, SUBSEQ VISIT: HCPCS | Performed by: FAMILY MEDICINE

## 2025-04-01 PROCEDURE — 3074F SYST BP LT 130 MM HG: CPT | Performed by: FAMILY MEDICINE

## 2025-04-01 PROCEDURE — 90677 PCV20 VACCINE IM: CPT | Performed by: FAMILY MEDICINE

## 2025-04-01 PROCEDURE — 3078F DIAST BP <80 MM HG: CPT | Performed by: FAMILY MEDICINE

## 2025-04-01 SDOH — HEALTH STABILITY: PHYSICAL HEALTH: ON AVERAGE, HOW MANY DAYS PER WEEK DO YOU ENGAGE IN MODERATE TO STRENUOUS EXERCISE (LIKE A BRISK WALK)?: 5 DAYS

## 2025-04-01 ASSESSMENT — PAIN SCALES - GENERAL: PAINLEVEL_OUTOF10: NO PAIN (0)

## 2025-04-01 ASSESSMENT — SOCIAL DETERMINANTS OF HEALTH (SDOH): HOW OFTEN DO YOU GET TOGETHER WITH FRIENDS OR RELATIVES?: TWICE A WEEK
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
## 2025-04-01 NOTE — NURSING NOTE
Prior to immunization administration, verified patients identity using patient s name and date of birth. Please see Immunization Activity for additional information.     Screening Questionnaire for Adult Immunization    Are you sick today?   No   Do you have allergies to medications, food, a vaccine component or latex?   No   Have you ever had a serious reaction after receiving a vaccination?   No   Do you have a long-term health problem with heart, lung, kidney, or metabolic disease (e.g., diabetes), asthma, a blood disorder, no spleen, complement component deficiency, a cochlear implant, or a spinal fluid leak?  Are you on long-term aspirin therapy?   No   Do you have cancer, leukemia, HIV/AIDS, or any other immune system problem?   No   Do you have a parent, brother, or sister with an immune system problem?   No   In the past 3 months, have you taken medications that affect  your immune system, such as prednisone, other steroids, or anticancer drugs; drugs for the treatment of rheumatoid arthritis, Crohn s disease, or psoriasis; or have you had radiation treatments?   No   Have you had a seizure, or a brain or other nervous system problem?   No   During the past year, have you received a transfusion of blood or blood    products, or been given immune (gamma) globulin or antiviral drug?   No   For women: Are you pregnant or is there a chance you could become       pregnant during the next month?   No   Have you received any vaccinations in the past 4 weeks?   No     Immunization questionnaire answers were all negative.      Patient instructed to remain in clinic for 15 minutes afterwards, and to report any adverse reactions.     Screening performed by Ana Kenney MA on 4/1/2025 at 9:09 AM.

## 2025-04-20 NOTE — PROGRESS NOTES
Infusion Nursing Note:  Shashi СВЕТЛАНА Raza presents today for Rocephin.    Patient seen by provider today: No   present during visit today: Not Applicable.    Note: Patient will come in tomorrow for last dose of IV Rocephin and MIDLINE removal.  Has midline - no blood return, flushes freely.    Intravenous Access:  Midline.    Treatment Conditions:  Not Applicable.    Post Infusion Assessment:  Patient tolerated infusion without incident.  Site patent and intact, free from redness, edema or discomfort.  No evidence of extravasations.     Discharge Plan:   Patient and/or family verbalized understanding of discharge instructions and all questions answered.  AVS to patient via [employer].  Patient will return 12/23/2022 for next appointment.   Patient discharged in stable condition accompanied by: self.  Departure Mode: Ambulatory.      Anna Celaya RN                    
Spouse/Significant other

## (undated) DEVICE — SU ETHILON 5-0 P-3 18" BLACK 698G

## (undated) DEVICE — SOL NACL 0.9% IRRIG 500ML BOTTLE 2F7123

## (undated) DEVICE — DRSG XEROFORM 1X8"

## (undated) DEVICE — PACK HAND CUSTOM ASC

## (undated) DEVICE — SU PROLENE 3-0 RB-1DA 30" 8872H

## (undated) DEVICE — PEN MARKING SKIN VISIMARK 1424SR

## (undated) DEVICE — LINEN ORTHO PACK 5446

## (undated) DEVICE — Device

## (undated) DEVICE — STRAP KNEE/BODY 31143004

## (undated) DEVICE — DRAPE STOCKINETTE 4" 8544

## (undated) DEVICE — PREP CHLORHEXIDINE 4% 4OZ (HIBICLENS) 57504

## (undated) DEVICE — GLOVE BIOGEL PI MICRO INDICATOR UNDERGLOVE SZ 6.0 48960

## (undated) DEVICE — SUCTION MANIFOLD NEPTUNE 2 SYS 4 PORT 0702-020-000

## (undated) DEVICE — DRAPE STERI TOWEL LG 1010

## (undated) DEVICE — KIT ENDO TURNOVER/PROCEDURE CARRY-ON 101822

## (undated) DEVICE — TUBING SUCTION 6"X3/16" N56A

## (undated) DEVICE — SU ETHILON 4-0 PC-3 18" 1864G

## (undated) DEVICE — SU MONOCRYL 5-0 P-3 18" UND Y493G

## (undated) DEVICE — PREP CHLORAPREP 26ML TINTED HI-LITE ORANGE 930815

## (undated) DEVICE — SU ETHILON 4-0 PS-2 18" BLACK 1667H

## (undated) DEVICE — GLOVE PROTEXIS BLUE W/NEU-THERA 6.0  2D73EB60

## (undated) DEVICE — BLADE KNIFE SURG 15 371115

## (undated) DEVICE — SOL WATER IRRIG 1000ML BOTTLE 2F7114

## (undated) DEVICE — SU PLAIN 4-0 FS-2 27" H821H

## (undated) DEVICE — GOWN XLG DISP 9545

## (undated) DEVICE — PREP SKIN SCRUB TRAY 4461A

## (undated) DEVICE — GLOVE PROTEXIS POWDER FREE SMT 6.5  2D72PT65X

## (undated) DEVICE — LINEN TOWEL PACK X5 5464

## (undated) DEVICE — DECANTER TRANSFER DEVICE 2008S

## (undated) DEVICE — DRSG STERI STRIP 1/2X4" R1547

## (undated) DEVICE — ESU HOLSTER PLASTIC DISP E2400

## (undated) DEVICE — TOURNIQUET SGL BLADDER 18"X4" RED 5921-218-135

## (undated) DEVICE — LINEN GOWN XLG 5407

## (undated) DEVICE — SOL WATER IRRIG 500ML BOTTLE 2F7113

## (undated) DEVICE — TOURNIQUET CUFF 18" REPRO RED 60-7070-103

## (undated) DEVICE — GLOVE BIOGEL PI SZ 6.0 40860

## (undated) RX ORDER — BUPIVACAINE HYDROCHLORIDE 5 MG/ML
INJECTION, SOLUTION PERINEURAL
Status: DISPENSED
Start: 2022-12-14

## (undated) RX ORDER — FENTANYL CITRATE 50 UG/ML
INJECTION, SOLUTION INTRAMUSCULAR; INTRAVENOUS
Status: DISPENSED
Start: 2022-09-23

## (undated) RX ORDER — ACETAMINOPHEN 325 MG/1
TABLET ORAL
Status: DISPENSED
Start: 2022-09-23

## (undated) RX ORDER — PROPOFOL 10 MG/ML
INJECTION, EMULSION INTRAVENOUS
Status: DISPENSED
Start: 2022-09-23

## (undated) RX ORDER — LIDOCAINE HYDROCHLORIDE AND EPINEPHRINE 10; 10 MG/ML; UG/ML
INJECTION, SOLUTION INFILTRATION; PERINEURAL
Status: DISPENSED
Start: 2022-09-23

## (undated) RX ORDER — PROPOFOL 10 MG/ML
INJECTION, EMULSION INTRAVENOUS
Status: DISPENSED
Start: 2022-12-14

## (undated) RX ORDER — ONDANSETRON 2 MG/ML
INJECTION INTRAMUSCULAR; INTRAVENOUS
Status: DISPENSED
Start: 2022-09-23

## (undated) RX ORDER — CEFAZOLIN SODIUM/WATER 2 G/20 ML
SYRINGE (ML) INTRAVENOUS
Status: DISPENSED
Start: 2022-12-14

## (undated) RX ORDER — CEFAZOLIN SODIUM 2 G/50ML
SOLUTION INTRAVENOUS
Status: DISPENSED
Start: 2022-09-23

## (undated) RX ORDER — LIDOCAINE HYDROCHLORIDE 10 MG/ML
INJECTION, SOLUTION EPIDURAL; INFILTRATION; INTRACAUDAL; PERINEURAL
Status: DISPENSED
Start: 2023-01-24

## (undated) RX ORDER — KETOROLAC TROMETHAMINE 30 MG/ML
INJECTION, SOLUTION INTRAMUSCULAR; INTRAVENOUS
Status: DISPENSED
Start: 2022-09-23

## (undated) RX ORDER — LIDOCAINE HYDROCHLORIDE 10 MG/ML
INJECTION, SOLUTION EPIDURAL; INFILTRATION; INTRACAUDAL; PERINEURAL
Status: DISPENSED
Start: 2022-12-14

## (undated) RX ORDER — FENTANYL CITRATE 50 UG/ML
INJECTION, SOLUTION INTRAMUSCULAR; INTRAVENOUS
Status: DISPENSED
Start: 2022-12-14